# Patient Record
Sex: FEMALE | Race: WHITE | Employment: OTHER | URBAN - METROPOLITAN AREA
[De-identification: names, ages, dates, MRNs, and addresses within clinical notes are randomized per-mention and may not be internally consistent; named-entity substitution may affect disease eponyms.]

---

## 2017-01-06 ENCOUNTER — OFFICE VISIT (OUTPATIENT)
Dept: NEUROLOGY | Age: 82
End: 2017-01-06

## 2017-01-06 VITALS
DIASTOLIC BLOOD PRESSURE: 70 MMHG | WEIGHT: 133 LBS | BODY MASS INDEX: 21.38 KG/M2 | HEIGHT: 66 IN | SYSTOLIC BLOOD PRESSURE: 132 MMHG | HEART RATE: 60 BPM

## 2017-01-06 DIAGNOSIS — M48.061 LUMBAR CANAL STENOSIS: Primary | ICD-10-CM

## 2017-01-06 DIAGNOSIS — G62.9 PERIPHERAL POLYNEUROPATHY: ICD-10-CM

## 2017-01-06 PROCEDURE — 99204 OFFICE O/P NEW MOD 45 MIN: CPT | Performed by: PSYCHIATRY & NEUROLOGY

## 2017-01-06 RX ORDER — FLUOXETINE 10 MG/1
10 CAPSULE ORAL DAILY
COMMUNITY
End: 2017-01-30 | Stop reason: SDUPTHER

## 2017-01-06 RX ORDER — CLONAZEPAM 0.5 MG/1
0.5 TABLET ORAL 2 TIMES DAILY PRN
COMMUNITY
End: 2017-01-10

## 2017-01-10 ENCOUNTER — OFFICE VISIT (OUTPATIENT)
Dept: INTERNAL MEDICINE CLINIC | Age: 82
End: 2017-01-10

## 2017-01-10 VITALS
RESPIRATION RATE: 20 BRPM | BODY MASS INDEX: 21.53 KG/M2 | WEIGHT: 134 LBS | HEIGHT: 66 IN | DIASTOLIC BLOOD PRESSURE: 70 MMHG | SYSTOLIC BLOOD PRESSURE: 180 MMHG | TEMPERATURE: 98 F | HEART RATE: 70 BPM

## 2017-01-10 DIAGNOSIS — F32.9 MAJOR DEPRESSIVE DISORDER WITH SINGLE EPISODE, REMISSION STATUS UNSPECIFIED: Primary | ICD-10-CM

## 2017-01-10 PROCEDURE — 99214 OFFICE O/P EST MOD 30 MIN: CPT | Performed by: INTERNAL MEDICINE

## 2017-01-10 RX ORDER — CLONAZEPAM 0.5 MG/1
0.25 TABLET ORAL NIGHTLY
COMMUNITY
End: 2017-01-30 | Stop reason: SDUPTHER

## 2017-01-10 ASSESSMENT — ENCOUNTER SYMPTOMS: RESPIRATORY NEGATIVE: 1

## 2017-01-16 ENCOUNTER — OFFICE VISIT (OUTPATIENT)
Dept: INTERNAL MEDICINE CLINIC | Age: 82
End: 2017-01-16

## 2017-01-16 VITALS
DIASTOLIC BLOOD PRESSURE: 70 MMHG | SYSTOLIC BLOOD PRESSURE: 172 MMHG | WEIGHT: 132 LBS | RESPIRATION RATE: 14 BRPM | HEART RATE: 56 BPM | BODY MASS INDEX: 21.31 KG/M2

## 2017-01-16 DIAGNOSIS — F32.4 MAJOR DEPRESSIVE DISORDER WITH SINGLE EPISODE, IN PARTIAL REMISSION (HCC): ICD-10-CM

## 2017-01-16 DIAGNOSIS — I10 ESSENTIAL HYPERTENSION: Primary | ICD-10-CM

## 2017-01-16 PROCEDURE — 99214 OFFICE O/P EST MOD 30 MIN: CPT | Performed by: INTERNAL MEDICINE

## 2017-01-16 ASSESSMENT — ENCOUNTER SYMPTOMS: RESPIRATORY NEGATIVE: 1

## 2017-01-19 ENCOUNTER — TELEPHONE (OUTPATIENT)
Dept: INTERNAL MEDICINE CLINIC | Age: 82
End: 2017-01-19

## 2017-01-30 DIAGNOSIS — I10 ESSENTIAL HYPERTENSION: ICD-10-CM

## 2017-01-30 RX ORDER — FLUOXETINE 10 MG/1
10 CAPSULE ORAL DAILY
Qty: 30 CAPSULE | Refills: 3 | Status: SHIPPED | OUTPATIENT
Start: 2017-01-30 | End: 2017-03-17 | Stop reason: SDUPTHER

## 2017-01-30 RX ORDER — AMLODIPINE BESYLATE 5 MG/1
5 TABLET ORAL DAILY
Qty: 30 TABLET | Refills: 3 | Status: SHIPPED | OUTPATIENT
Start: 2017-01-30 | End: 2017-08-02 | Stop reason: SDUPTHER

## 2017-01-30 RX ORDER — CLONAZEPAM 0.5 MG/1
TABLET ORAL
Qty: 30 TABLET | Refills: 0 | OUTPATIENT
Start: 2017-01-30 | End: 2017-02-20 | Stop reason: SDUPTHER

## 2017-02-03 ENCOUNTER — OFFICE VISIT (OUTPATIENT)
Dept: NEUROLOGY | Age: 82
End: 2017-02-03

## 2017-02-03 VITALS
BODY MASS INDEX: 21.53 KG/M2 | WEIGHT: 134 LBS | SYSTOLIC BLOOD PRESSURE: 120 MMHG | HEART RATE: 48 BPM | HEIGHT: 66 IN | DIASTOLIC BLOOD PRESSURE: 60 MMHG

## 2017-02-03 DIAGNOSIS — M48.061 LUMBAR CANAL STENOSIS: ICD-10-CM

## 2017-02-03 DIAGNOSIS — G62.9 PERIPHERAL POLYNEUROPATHY: Primary | ICD-10-CM

## 2017-02-03 PROCEDURE — 99214 OFFICE O/P EST MOD 30 MIN: CPT | Performed by: PSYCHIATRY & NEUROLOGY

## 2017-02-04 ENCOUNTER — TELEPHONE (OUTPATIENT)
Dept: NEUROLOGY | Age: 82
End: 2017-02-04

## 2017-02-06 DIAGNOSIS — G62.9 PERIPHERAL POLYNEUROPATHY: ICD-10-CM

## 2017-02-06 LAB
C-REACTIVE PROTEIN: 0.8 MG/L (ref 0–5.1)
RHEUMATOID FACTOR: <10 IU/ML
TOTAL CK: 46 U/L (ref 26–192)

## 2017-02-07 LAB
ANA INTERPRETATION: NORMAL
ANTI-NUCLEAR ANTIBODY (ANA): NEGATIVE

## 2017-02-08 LAB
ANGIOTENSIN CONVERTING ENZYME: 20 U/L (ref 9–67)
ENA TO SSA (RO) ANTIBODY: NEGATIVE EU
ENA TO SSB (LA) ANTIBODY: NEGATIVE EU

## 2017-02-09 LAB — VITAMIN B6: 409.8 NMOL/L (ref 20–125)

## 2017-02-17 RX ORDER — CLONAZEPAM 0.5 MG/1
TABLET ORAL
Qty: 30 TABLET | OUTPATIENT
Start: 2017-02-17

## 2017-02-20 RX ORDER — CLONAZEPAM 0.5 MG/1
TABLET ORAL
Qty: 30 TABLET | Refills: 0 | Status: SHIPPED | OUTPATIENT
Start: 2017-02-20 | End: 2017-06-28

## 2017-02-21 ENCOUNTER — TELEPHONE (OUTPATIENT)
Dept: INTERNAL MEDICINE CLINIC | Age: 82
End: 2017-02-21

## 2017-03-17 ENCOUNTER — INITIAL CONSULT (OUTPATIENT)
Dept: PSYCHIATRY | Age: 82
End: 2017-03-17

## 2017-03-17 VITALS
WEIGHT: 134 LBS | HEART RATE: 72 BPM | SYSTOLIC BLOOD PRESSURE: 130 MMHG | RESPIRATION RATE: 14 BRPM | BODY MASS INDEX: 21.53 KG/M2 | DIASTOLIC BLOOD PRESSURE: 84 MMHG | HEIGHT: 66 IN

## 2017-03-17 DIAGNOSIS — F32.A DEPRESSION, UNSPECIFIED DEPRESSION TYPE: Primary | ICD-10-CM

## 2017-03-17 DIAGNOSIS — F41.9 ANXIETY: ICD-10-CM

## 2017-03-17 PROBLEM — G62.9 NEUROPATHY: Status: ACTIVE | Noted: 2017-03-17

## 2017-03-17 PROCEDURE — 99204 OFFICE O/P NEW MOD 45 MIN: CPT | Performed by: PSYCHIATRY & NEUROLOGY

## 2017-03-17 RX ORDER — FLUOXETINE HYDROCHLORIDE 20 MG/1
20 CAPSULE ORAL DAILY
Qty: 30 CAPSULE | Refills: 2 | Status: SHIPPED | OUTPATIENT
Start: 2017-03-17 | End: 2017-03-30 | Stop reason: SDUPTHER

## 2017-03-17 ASSESSMENT — ENCOUNTER SYMPTOMS
SHORTNESS OF BREATH: 0
DIARRHEA: 0
NAUSEA: 0
CHEST TIGHTNESS: 0

## 2017-03-27 ENCOUNTER — TELEPHONE (OUTPATIENT)
Dept: INTERNAL MEDICINE CLINIC | Age: 82
End: 2017-03-27

## 2017-03-28 ENCOUNTER — OFFICE VISIT (OUTPATIENT)
Dept: INTERNAL MEDICINE CLINIC | Age: 82
End: 2017-03-28

## 2017-03-28 VITALS
DIASTOLIC BLOOD PRESSURE: 80 MMHG | TEMPERATURE: 97.4 F | OXYGEN SATURATION: 96 % | WEIGHT: 133 LBS | HEART RATE: 76 BPM | BODY MASS INDEX: 21.47 KG/M2 | RESPIRATION RATE: 17 BRPM | SYSTOLIC BLOOD PRESSURE: 170 MMHG

## 2017-03-28 DIAGNOSIS — I10 ESSENTIAL HYPERTENSION: ICD-10-CM

## 2017-03-28 DIAGNOSIS — F32.4 MAJOR DEPRESSIVE DISORDER WITH SINGLE EPISODE, IN PARTIAL REMISSION (HCC): ICD-10-CM

## 2017-03-28 DIAGNOSIS — R11.0 NAUSEA: Primary | ICD-10-CM

## 2017-03-28 PROCEDURE — 99213 OFFICE O/P EST LOW 20 MIN: CPT | Performed by: INTERNAL MEDICINE

## 2017-03-29 ENCOUNTER — TELEPHONE (OUTPATIENT)
Dept: FAMILY MEDICINE CLINIC | Age: 82
End: 2017-03-29

## 2017-03-29 ASSESSMENT — ENCOUNTER SYMPTOMS
RESPIRATORY NEGATIVE: 1
GASTROINTESTINAL NEGATIVE: 1

## 2017-03-30 ENCOUNTER — TELEPHONE (OUTPATIENT)
Dept: INTERNAL MEDICINE CLINIC | Age: 82
End: 2017-03-30

## 2017-03-30 ENCOUNTER — TELEPHONE (OUTPATIENT)
Dept: FAMILY MEDICINE CLINIC | Age: 82
End: 2017-03-30

## 2017-03-30 RX ORDER — FLUOXETINE 10 MG/1
10 CAPSULE ORAL DAILY
Qty: 30 CAPSULE | Refills: 2 | Status: SHIPPED | OUTPATIENT
Start: 2017-03-30 | End: 2017-06-28 | Stop reason: DRUGHIGH

## 2017-05-02 ENCOUNTER — OFFICE VISIT (OUTPATIENT)
Dept: PSYCHIATRY | Age: 82
End: 2017-05-02

## 2017-05-02 VITALS
OXYGEN SATURATION: 94 % | SYSTOLIC BLOOD PRESSURE: 132 MMHG | DIASTOLIC BLOOD PRESSURE: 64 MMHG | HEIGHT: 66 IN | BODY MASS INDEX: 21.21 KG/M2 | RESPIRATION RATE: 14 BRPM | WEIGHT: 132 LBS | HEART RATE: 64 BPM

## 2017-05-02 DIAGNOSIS — F32.A DEPRESSION, UNSPECIFIED DEPRESSION TYPE: Primary | ICD-10-CM

## 2017-05-02 DIAGNOSIS — F41.9 ANXIETY: ICD-10-CM

## 2017-05-02 PROCEDURE — 99214 OFFICE O/P EST MOD 30 MIN: CPT | Performed by: PSYCHIATRY & NEUROLOGY

## 2017-05-02 RX ORDER — FLUOXETINE 10 MG/1
5 TABLET ORAL DAILY
Qty: 45 TABLET | Refills: 2 | Status: SHIPPED | OUTPATIENT
Start: 2017-05-02 | End: 2017-06-28 | Stop reason: DRUGHIGH

## 2017-06-28 ENCOUNTER — OFFICE VISIT (OUTPATIENT)
Dept: INTERNAL MEDICINE CLINIC | Age: 82
End: 2017-06-28

## 2017-06-28 VITALS
SYSTOLIC BLOOD PRESSURE: 130 MMHG | WEIGHT: 135 LBS | RESPIRATION RATE: 14 BRPM | BODY MASS INDEX: 21.79 KG/M2 | HEART RATE: 49 BPM | DIASTOLIC BLOOD PRESSURE: 80 MMHG

## 2017-06-28 DIAGNOSIS — R73.02 IGT (IMPAIRED GLUCOSE TOLERANCE): ICD-10-CM

## 2017-06-28 DIAGNOSIS — I10 ESSENTIAL HYPERTENSION: Primary | ICD-10-CM

## 2017-06-28 DIAGNOSIS — G62.9 PERIPHERAL POLYNEUROPATHY: ICD-10-CM

## 2017-06-28 LAB
ANION GAP SERPL CALCULATED.3IONS-SCNC: 18 MMOL/L (ref 3–16)
BUN BLDV-MCNC: 22 MG/DL (ref 7–20)
CALCIUM SERPL-MCNC: 9.8 MG/DL (ref 8.3–10.6)
CHLORIDE BLD-SCNC: 99 MMOL/L (ref 99–110)
CO2: 24 MMOL/L (ref 21–32)
CREAT SERPL-MCNC: 0.8 MG/DL (ref 0.6–1.2)
GFR AFRICAN AMERICAN: >60
GFR NON-AFRICAN AMERICAN: >60
GLUCOSE BLD-MCNC: 94 MG/DL (ref 70–99)
HBA1C MFR BLD: 5.5 %
POTASSIUM SERPL-SCNC: 4.7 MMOL/L (ref 3.5–5.1)
SODIUM BLD-SCNC: 141 MMOL/L (ref 136–145)

## 2017-06-28 PROCEDURE — 83036 HEMOGLOBIN GLYCOSYLATED A1C: CPT | Performed by: INTERNAL MEDICINE

## 2017-06-28 PROCEDURE — 36415 COLL VENOUS BLD VENIPUNCTURE: CPT | Performed by: INTERNAL MEDICINE

## 2017-06-28 PROCEDURE — 99213 OFFICE O/P EST LOW 20 MIN: CPT | Performed by: INTERNAL MEDICINE

## 2017-06-28 RX ORDER — FLUOXETINE 10 MG/1
5 TABLET ORAL DAILY
Qty: 45 TABLET | Refills: 2 | Status: SHIPPED
Start: 2017-06-28 | End: 2017-11-03 | Stop reason: SDUPTHER

## 2017-06-28 RX ORDER — CLONAZEPAM 0.5 MG/1
0.5 TABLET ORAL PRN
COMMUNITY
End: 2017-06-28 | Stop reason: DRUGHIGH

## 2017-06-28 RX ORDER — METOPROLOL SUCCINATE 50 MG/1
TABLET, EXTENDED RELEASE ORAL
Qty: 30 TABLET | Refills: 5 | Status: SHIPPED
Start: 2017-06-28 | End: 2017-08-16 | Stop reason: ALTCHOICE

## 2017-06-28 RX ORDER — CLONAZEPAM 0.5 MG/1
0.5 TABLET ORAL DAILY
Qty: 60 TABLET | Refills: 1 | Status: SHIPPED
Start: 2017-06-28 | End: 2017-07-06 | Stop reason: SDUPTHER

## 2017-07-05 ENCOUNTER — TELEPHONE (OUTPATIENT)
Dept: INTERNAL MEDICINE CLINIC | Age: 82
End: 2017-07-05

## 2017-07-06 RX ORDER — CLONAZEPAM 0.5 MG/1
TABLET ORAL
Qty: 30 TABLET | Refills: 1 | Status: SHIPPED | OUTPATIENT
Start: 2017-07-06 | End: 2017-08-29 | Stop reason: SDUPTHER

## 2017-08-02 DIAGNOSIS — I10 ESSENTIAL HYPERTENSION: ICD-10-CM

## 2017-08-02 RX ORDER — AMLODIPINE BESYLATE 5 MG/1
TABLET ORAL
Qty: 30 TABLET | Refills: 5 | Status: SHIPPED | OUTPATIENT
Start: 2017-08-02 | End: 2018-08-06

## 2017-08-16 ENCOUNTER — OFFICE VISIT (OUTPATIENT)
Dept: INTERNAL MEDICINE CLINIC | Age: 82
End: 2017-08-16

## 2017-08-16 VITALS
BODY MASS INDEX: 21.53 KG/M2 | RESPIRATION RATE: 16 BRPM | HEIGHT: 66 IN | WEIGHT: 134 LBS | SYSTOLIC BLOOD PRESSURE: 138 MMHG | DIASTOLIC BLOOD PRESSURE: 76 MMHG

## 2017-08-16 DIAGNOSIS — F32.9 SINGLE CURRENT EPISODE OF MAJOR DEPRESSIVE DISORDER, UNSPECIFIED DEPRESSION EPISODE SEVERITY: Primary | ICD-10-CM

## 2017-08-16 PROCEDURE — 99213 OFFICE O/P EST LOW 20 MIN: CPT | Performed by: INTERNAL MEDICINE

## 2017-08-29 ENCOUNTER — OFFICE VISIT (OUTPATIENT)
Dept: INTERNAL MEDICINE CLINIC | Age: 82
End: 2017-08-29

## 2017-08-29 VITALS
BODY MASS INDEX: 21.79 KG/M2 | RESPIRATION RATE: 14 BRPM | DIASTOLIC BLOOD PRESSURE: 80 MMHG | HEART RATE: 60 BPM | SYSTOLIC BLOOD PRESSURE: 150 MMHG | WEIGHT: 135 LBS

## 2017-08-29 DIAGNOSIS — R11.0 NAUSEA: ICD-10-CM

## 2017-08-29 DIAGNOSIS — F32.4 MAJOR DEPRESSIVE DISORDER WITH SINGLE EPISODE, IN PARTIAL REMISSION (HCC): Primary | ICD-10-CM

## 2017-08-29 PROCEDURE — 99213 OFFICE O/P EST LOW 20 MIN: CPT | Performed by: INTERNAL MEDICINE

## 2017-08-29 RX ORDER — CLONAZEPAM 0.5 MG/1
TABLET ORAL
Qty: 30 TABLET | Refills: 1 | Status: SHIPPED | OUTPATIENT
Start: 2017-08-29 | End: 2017-11-03 | Stop reason: SDUPTHER

## 2017-08-29 ASSESSMENT — ENCOUNTER SYMPTOMS
ABDOMINAL PAIN: 0
VOMITING: 0
NAUSEA: 1

## 2017-09-18 ENCOUNTER — TELEPHONE (OUTPATIENT)
Dept: INTERNAL MEDICINE CLINIC | Age: 82
End: 2017-09-18

## 2017-09-27 ENCOUNTER — OFFICE VISIT (OUTPATIENT)
Dept: INTERNAL MEDICINE CLINIC | Age: 82
End: 2017-09-27

## 2017-09-27 VITALS
DIASTOLIC BLOOD PRESSURE: 70 MMHG | RESPIRATION RATE: 16 BRPM | BODY MASS INDEX: 21.63 KG/M2 | OXYGEN SATURATION: 98 % | HEART RATE: 58 BPM | WEIGHT: 134 LBS | SYSTOLIC BLOOD PRESSURE: 140 MMHG

## 2017-09-27 DIAGNOSIS — I25.10 CORONARY ARTERY DISEASE INVOLVING NATIVE CORONARY ARTERY OF NATIVE HEART WITHOUT ANGINA PECTORIS: ICD-10-CM

## 2017-09-27 DIAGNOSIS — F32.4 MAJOR DEPRESSIVE DISORDER WITH SINGLE EPISODE, IN PARTIAL REMISSION (HCC): ICD-10-CM

## 2017-09-27 DIAGNOSIS — I10 ESSENTIAL HYPERTENSION: Primary | ICD-10-CM

## 2017-09-27 PROCEDURE — 99214 OFFICE O/P EST MOD 30 MIN: CPT | Performed by: INTERNAL MEDICINE

## 2017-09-27 ASSESSMENT — ENCOUNTER SYMPTOMS: RESPIRATORY NEGATIVE: 1

## 2017-09-28 ENCOUNTER — HOSPITAL ENCOUNTER (OUTPATIENT)
Dept: WOMENS IMAGING | Age: 82
Discharge: HOME | End: 2017-09-28
Attending: SURGERY | Admitting: SURGERY

## 2017-09-28 DIAGNOSIS — Z12.31 ENCOUNTER FOR SCREENING MAMMOGRAM FOR MALIGNANT NEOPLASM OF BREAST: ICD-10-CM

## 2017-11-03 ENCOUNTER — TELEPHONE (OUTPATIENT)
Dept: INTERNAL MEDICINE CLINIC | Age: 82
End: 2017-11-03

## 2017-11-03 ENCOUNTER — OFFICE VISIT (OUTPATIENT)
Dept: PSYCHIATRY | Age: 82
End: 2017-11-03

## 2017-11-03 VITALS
RESPIRATION RATE: 16 BRPM | HEIGHT: 66 IN | BODY MASS INDEX: 21.21 KG/M2 | WEIGHT: 132 LBS | HEART RATE: 74 BPM | SYSTOLIC BLOOD PRESSURE: 128 MMHG | DIASTOLIC BLOOD PRESSURE: 88 MMHG

## 2017-11-03 DIAGNOSIS — F32.A DEPRESSION, UNSPECIFIED DEPRESSION TYPE: Primary | ICD-10-CM

## 2017-11-03 DIAGNOSIS — F41.9 ANXIETY: ICD-10-CM

## 2017-11-03 PROCEDURE — 99214 OFFICE O/P EST MOD 30 MIN: CPT | Performed by: PSYCHIATRY & NEUROLOGY

## 2017-11-03 RX ORDER — FLUOXETINE 10 MG/1
5 TABLET ORAL DAILY
Qty: 45 TABLET | Refills: 2 | Status: SHIPPED | OUTPATIENT
Start: 2017-11-03 | End: 2018-03-26 | Stop reason: SDUPTHER

## 2017-11-03 RX ORDER — CLONAZEPAM 0.5 MG/1
TABLET ORAL
Qty: 30 TABLET | Refills: 1 | Status: SHIPPED | OUTPATIENT
Start: 2017-11-03 | End: 2018-03-26 | Stop reason: SDUPTHER

## 2017-11-03 NOTE — PROGRESS NOTES
Psych Follow Up Progress Note    11/3/17  Terrie Flowers  [id number]    I have reviewed recent documentation:  Terrie Flowers is a 80 y.o. female  This patient  has a past medical history of Aortic insufficiency; Breast cancer ([facility] [address] CAD (coronary artery disease); HTN (hypertension); and SVT (supraventricular tachycardia) ([facility] [address] Chief Complaint   Patient presents with    Depression    Discuss Medications     cloudy/disoriented in the morning     Subjective/Interval Hx:   Rowan Melgoza is having some medical trouble. He had an MI in early Sept,. Got on a bunch of meds that he didn't tolerate, ended up in the hospital.  Got an allergic rxn, just terrible. Granddaughter Yanique Reis is staying with them and her daughter. Objective:  Vitals:    11/03/17 1411   BP: 128/88   Pulse: 74   Resp: 16   Weight: 132 lb (59.9 kg)   Height: 5' 6\" (1.676 m)     Waist Circumference  Waist (Inches): 30 in    No results found for this or any previous visit (from the past 168 hour(s)). Current Outpatient Prescriptions   Medication Sig Dispense Refill    clonazePAM (KLONOPIN) 0.5 MG tablet Take 1/4 tablet daily. . 30 tablet 1    amLODIPine (NORVASC) 5 MG tablet TAKE ONE TABLET BY MOUTH DAILY 30 tablet 5    FLUoxetine HCl, PMDD, 10 MG TABS Take 5 mg by mouth daily Take 1/2 tab (2.5 mg) daily. 45 tablet 2    nitroGLYCERIN (NITROSTAT) 0.4 MG SL tablet Place 0.4 mg under the tongue every 5 minutes as needed for Chest pain Dissolve 1 tab under tongue at first sign of chest pain. May repeat every 5 minutes until relief is obtained. If pain persists after taking 3 tabs in a 15-minute period, or the pain is different than is typically experienced, call 9-1-1 immediately.       Multiple Vitamin (MULTI VITAMIN DAILY PO) Take by mouth Prodovite      Ascorbic Acid (VITAMIN C) 500 MG CAPS Take 1 tablet by mouth      Potassium Gluconate 550 MG TABS Take by mouth      Cyanocobalamin (VITAMIN B12 PO) Take by mouth      Vitamin D (CHOLECALCIFEROL) 1000 UNITS CAPS capsule Take 1,000 Units by mouth daily      aspirin 81 MG tablet Take 81 mg by mouth daily. No current facility-administered medications for this visit. Controlled Substances Monitoring:     Attestation: The Prescription Monitoring Report for this patient was reviewed today. Katlyn Ulloa MD)  Documentation: No signs of potential drug abuse or diversion identified. Katlyn Ulloa MD)    ROS:  No tremor, gait nl    MSE:  A-casually dressed, good EC, pleasant and engageable  A-full  M-essentially euthymic  S-grossly A + O  I/J-fair/fair  T-linear, goal-directed. Speech c nl r/t/v/a. No S/H I, no A/V H.      A/P  1. OCPD traits, anxiety NOS-Prozac 5 mg qd and klonopin 0.25 bid prn. Jarek Toribio Dr. [id number]. Sees therapist Rosalind Falcon.

## 2017-11-08 ENCOUNTER — TELEPHONE (OUTPATIENT)
Dept: INTERNAL MEDICINE CLINIC | Age: 82
End: 2017-11-08

## 2018-03-26 ENCOUNTER — OFFICE VISIT (OUTPATIENT)
Dept: PSYCHIATRY | Age: 83
End: 2018-03-26

## 2018-03-26 VITALS
WEIGHT: 130.4 LBS | HEIGHT: 66 IN | OXYGEN SATURATION: 95 % | DIASTOLIC BLOOD PRESSURE: 86 MMHG | BODY MASS INDEX: 20.96 KG/M2 | HEART RATE: 58 BPM | SYSTOLIC BLOOD PRESSURE: 138 MMHG

## 2018-03-26 DIAGNOSIS — F32.A DEPRESSION, UNSPECIFIED DEPRESSION TYPE: ICD-10-CM

## 2018-03-26 DIAGNOSIS — F41.9 ANXIETY: Primary | ICD-10-CM

## 2018-03-26 PROCEDURE — 99214 OFFICE O/P EST MOD 30 MIN: CPT | Performed by: PSYCHIATRY & NEUROLOGY

## 2018-03-26 RX ORDER — CLONAZEPAM 0.5 MG/1
TABLET ORAL
Qty: 30 TABLET | Refills: 1 | Status: SHIPPED | OUTPATIENT
Start: 2018-03-26 | End: 2018-09-21 | Stop reason: SDUPTHER

## 2018-03-26 RX ORDER — FLUOXETINE 10 MG/1
5 TABLET ORAL DAILY
Qty: 45 TABLET | Refills: 2 | Status: SHIPPED | OUTPATIENT
Start: 2018-03-26 | End: 2018-09-21 | Stop reason: SDUPTHER

## 2018-05-23 ENCOUNTER — OFFICE VISIT (OUTPATIENT)
Dept: INTERNAL MEDICINE CLINIC | Age: 83
End: 2018-05-23

## 2018-05-23 VITALS
SYSTOLIC BLOOD PRESSURE: 147 MMHG | HEIGHT: 66 IN | WEIGHT: 130 LBS | BODY MASS INDEX: 20.89 KG/M2 | HEART RATE: 64 BPM | DIASTOLIC BLOOD PRESSURE: 70 MMHG

## 2018-05-23 DIAGNOSIS — H25.013 CORTICAL AGE-RELATED CATARACT OF BOTH EYES: ICD-10-CM

## 2018-05-23 DIAGNOSIS — I25.10 CORONARY ARTERY DISEASE INVOLVING NATIVE CORONARY ARTERY OF NATIVE HEART WITHOUT ANGINA PECTORIS: ICD-10-CM

## 2018-05-23 DIAGNOSIS — I10 ESSENTIAL HYPERTENSION: ICD-10-CM

## 2018-05-23 DIAGNOSIS — Z01.818 PRE-OP EXAMINATION: Primary | ICD-10-CM

## 2018-05-23 DIAGNOSIS — E78.2 MIXED HYPERLIPIDEMIA: ICD-10-CM

## 2018-05-23 DIAGNOSIS — F32.5 MAJOR DEPRESSIVE DISORDER WITH SINGLE EPISODE, IN FULL REMISSION (HCC): ICD-10-CM

## 2018-05-23 DIAGNOSIS — I47.1 SVT (SUPRAVENTRICULAR TACHYCARDIA) (HCC): ICD-10-CM

## 2018-05-23 DIAGNOSIS — G62.9 PERIPHERAL POLYNEUROPATHY: ICD-10-CM

## 2018-05-23 LAB
ANION GAP SERPL CALCULATED.3IONS-SCNC: 14 MMOL/L (ref 3–16)
BASOPHILS ABSOLUTE: 0.1 K/UL (ref 0–0.2)
BASOPHILS RELATIVE PERCENT: 0.8 %
BUN BLDV-MCNC: 24 MG/DL (ref 7–20)
CALCIUM SERPL-MCNC: 9.9 MG/DL (ref 8.3–10.6)
CHLORIDE BLD-SCNC: 102 MMOL/L (ref 99–110)
CO2: 27 MMOL/L (ref 21–32)
CREAT SERPL-MCNC: 0.8 MG/DL (ref 0.6–1.2)
EOSINOPHILS ABSOLUTE: 0.2 K/UL (ref 0–0.6)
EOSINOPHILS RELATIVE PERCENT: 2.2 %
GFR AFRICAN AMERICAN: >60
GFR NON-AFRICAN AMERICAN: >60
GLUCOSE BLD-MCNC: 115 MG/DL (ref 70–99)
HCT VFR BLD CALC: 39.1 % (ref 36–48)
HEMOGLOBIN: 13.5 G/DL (ref 12–16)
LYMPHOCYTES ABSOLUTE: 1.6 K/UL (ref 1–5.1)
LYMPHOCYTES RELATIVE PERCENT: 22 %
MCH RBC QN AUTO: 33.7 PG (ref 26–34)
MCHC RBC AUTO-ENTMCNC: 34.5 G/DL (ref 31–36)
MCV RBC AUTO: 97.8 FL (ref 80–100)
MONOCYTES ABSOLUTE: 0.7 K/UL (ref 0–1.3)
MONOCYTES RELATIVE PERCENT: 9.1 %
NEUTROPHILS ABSOLUTE: 4.9 K/UL (ref 1.7–7.7)
NEUTROPHILS RELATIVE PERCENT: 65.9 %
PDW BLD-RTO: 13.7 % (ref 12.4–15.4)
PLATELET # BLD: 305 K/UL (ref 135–450)
PMV BLD AUTO: 8.9 FL (ref 5–10.5)
POTASSIUM SERPL-SCNC: 4.2 MMOL/L (ref 3.5–5.1)
RBC # BLD: 4 M/UL (ref 4–5.2)
SODIUM BLD-SCNC: 143 MMOL/L (ref 136–145)
WBC # BLD: 7.5 K/UL (ref 4–11)

## 2018-05-23 PROCEDURE — 36415 COLL VENOUS BLD VENIPUNCTURE: CPT | Performed by: INTERNAL MEDICINE

## 2018-05-23 PROCEDURE — 99215 OFFICE O/P EST HI 40 MIN: CPT | Performed by: INTERNAL MEDICINE

## 2018-05-23 PROCEDURE — 93000 ELECTROCARDIOGRAM COMPLETE: CPT | Performed by: INTERNAL MEDICINE

## 2018-05-23 ASSESSMENT — ENCOUNTER SYMPTOMS: GASTROINTESTINAL NEGATIVE: 1

## 2018-06-04 ENCOUNTER — HOSPITAL ENCOUNTER (OUTPATIENT)
Dept: SURGERY | Age: 83
Discharge: HOME | End: 2018-06-04
Attending: OPHTHALMOLOGY | Admitting: OPHTHALMOLOGY

## 2018-06-04 VITALS
RESPIRATION RATE: 18 BRPM | OXYGEN SATURATION: 100 % | SYSTOLIC BLOOD PRESSURE: 146 MMHG | TEMPERATURE: 97 F | HEART RATE: 54 BPM | DIASTOLIC BLOOD PRESSURE: 50 MMHG

## 2018-06-04 DIAGNOSIS — Z98.42 CATARACT EXTRACTION STATUS OF EYE, LEFT: ICD-10-CM

## 2018-06-04 DIAGNOSIS — H25.812 COMBINED FORMS OF AGE-RELATED CATARACT OF LEFT EYE: ICD-10-CM

## 2018-06-04 RX ORDER — SODIUM CHLORIDE 0.9 % (FLUSH) 0.9 %
10 SYRINGE (ML) INJECTION PRN
Status: DISCONTINUED | OUTPATIENT
Start: 2018-06-04 | End: 2018-06-04 | Stop reason: SDUPTHER

## 2018-06-04 RX ORDER — SODIUM CHLORIDE 0.9 % (FLUSH) 0.9 %
10 SYRINGE (ML) INJECTION EVERY 12 HOURS SCHEDULED
Status: DISCONTINUED | OUTPATIENT
Start: 2018-06-04 | End: 2018-06-05 | Stop reason: HOSPADM

## 2018-06-04 RX ORDER — CIPROFLOXACIN HYDROCHLORIDE 3.5 MG/ML
1 SOLUTION/ DROPS TOPICAL
Status: COMPLETED | OUTPATIENT
Start: 2018-06-04 | End: 2018-06-04

## 2018-06-04 RX ORDER — SODIUM CHLORIDE 0.9 % (FLUSH) 0.9 %
10 SYRINGE (ML) INJECTION EVERY 12 HOURS SCHEDULED
Status: DISCONTINUED | OUTPATIENT
Start: 2018-06-04 | End: 2018-06-04 | Stop reason: SDUPTHER

## 2018-06-04 RX ORDER — ONDANSETRON 2 MG/ML
4 INJECTION INTRAMUSCULAR; INTRAVENOUS
Status: ACTIVE | OUTPATIENT
Start: 2018-06-04 | End: 2018-06-04

## 2018-06-04 RX ORDER — SODIUM CHLORIDE 9 MG/ML
INJECTION, SOLUTION INTRAVENOUS CONTINUOUS
Status: DISCONTINUED | OUTPATIENT
Start: 2018-06-04 | End: 2018-06-05 | Stop reason: HOSPADM

## 2018-06-04 RX ORDER — SODIUM CHLORIDE 0.9 % (FLUSH) 0.9 %
10 SYRINGE (ML) INJECTION PRN
Status: DISCONTINUED | OUTPATIENT
Start: 2018-06-04 | End: 2018-06-05 | Stop reason: HOSPADM

## 2018-06-04 RX ADMIN — SODIUM CHLORIDE: 9 INJECTION, SOLUTION INTRAVENOUS at 07:03

## 2018-06-04 RX ADMIN — Medication 0.1 ML: at 07:02

## 2018-06-04 RX ADMIN — CIPROFLOXACIN HYDROCHLORIDE 1 DROP: 3.5 SOLUTION/ DROPS TOPICAL at 07:01

## 2018-06-04 RX ADMIN — CIPROFLOXACIN HYDROCHLORIDE 1 DROP: 3.5 SOLUTION/ DROPS TOPICAL at 07:06

## 2018-06-04 ASSESSMENT — PAIN - FUNCTIONAL ASSESSMENT: PAIN_FUNCTIONAL_ASSESSMENT: 0-10

## 2018-06-04 ASSESSMENT — LIFESTYLE VARIABLES: SMOKING_STATUS: 0

## 2018-06-04 ASSESSMENT — ENCOUNTER SYMPTOMS: SHORTNESS OF BREATH: 0

## 2018-06-18 ENCOUNTER — HOSPITAL ENCOUNTER (OUTPATIENT)
Dept: SURGERY | Age: 83
Discharge: HOME | End: 2018-06-18
Attending: OPHTHALMOLOGY | Admitting: OPHTHALMOLOGY

## 2018-06-18 VITALS
OXYGEN SATURATION: 100 % | TEMPERATURE: 97.3 F | RESPIRATION RATE: 13 BRPM | SYSTOLIC BLOOD PRESSURE: 145 MMHG | HEART RATE: 49 BPM | DIASTOLIC BLOOD PRESSURE: 60 MMHG

## 2018-06-18 DIAGNOSIS — H25.011 CORTICAL AGE-RELATED CATARACT OF RIGHT EYE: ICD-10-CM

## 2018-06-18 DIAGNOSIS — Z98.41 CATARACT EXTRACTION STATUS OF EYE, RIGHT: ICD-10-CM

## 2018-06-18 RX ORDER — ONDANSETRON 2 MG/ML
4 INJECTION INTRAMUSCULAR; INTRAVENOUS
Status: ACTIVE | OUTPATIENT
Start: 2018-06-18 | End: 2018-06-18

## 2018-06-18 RX ORDER — SODIUM CHLORIDE 0.9 % (FLUSH) 0.9 %
10 SYRINGE (ML) INJECTION PRN
Status: DISCONTINUED | OUTPATIENT
Start: 2018-06-18 | End: 2018-06-19 | Stop reason: HOSPADM

## 2018-06-18 RX ORDER — SODIUM CHLORIDE 9 MG/ML
INJECTION, SOLUTION INTRAVENOUS CONTINUOUS
Status: DISCONTINUED | OUTPATIENT
Start: 2018-06-18 | End: 2018-06-19 | Stop reason: HOSPADM

## 2018-06-18 RX ORDER — CIPROFLOXACIN HYDROCHLORIDE 3.5 MG/ML
1 SOLUTION/ DROPS TOPICAL SEE ADMIN INSTRUCTIONS
Status: COMPLETED | OUTPATIENT
Start: 2018-06-18 | End: 2018-06-18

## 2018-06-18 RX ORDER — SODIUM CHLORIDE 0.9 % (FLUSH) 0.9 %
10 SYRINGE (ML) INJECTION EVERY 12 HOURS SCHEDULED
Status: DISCONTINUED | OUTPATIENT
Start: 2018-06-18 | End: 2018-06-19 | Stop reason: HOSPADM

## 2018-06-18 RX ORDER — PROMETHAZINE HYDROCHLORIDE 25 MG/ML
6.25 INJECTION, SOLUTION INTRAMUSCULAR; INTRAVENOUS
Status: ACTIVE | OUTPATIENT
Start: 2018-06-18 | End: 2018-06-18

## 2018-06-18 RX ORDER — LABETALOL HYDROCHLORIDE 5 MG/ML
5 INJECTION, SOLUTION INTRAVENOUS EVERY 10 MIN PRN
Status: DISCONTINUED | OUTPATIENT
Start: 2018-06-18 | End: 2018-06-19 | Stop reason: HOSPADM

## 2018-06-18 RX ADMIN — CIPROFLOXACIN HYDROCHLORIDE 1 DROP: 3.5 SOLUTION/ DROPS TOPICAL at 06:52

## 2018-06-18 RX ADMIN — Medication 0.1 ML: at 06:52

## 2018-06-18 RX ADMIN — SODIUM CHLORIDE: 9 INJECTION, SOLUTION INTRAVENOUS at 06:52

## 2018-06-18 RX ADMIN — CIPROFLOXACIN HYDROCHLORIDE 1 DROP: 3.5 SOLUTION/ DROPS TOPICAL at 06:49

## 2018-06-18 ASSESSMENT — ENCOUNTER SYMPTOMS: SHORTNESS OF BREATH: 0

## 2018-06-18 ASSESSMENT — LIFESTYLE VARIABLES: SMOKING_STATUS: 0

## 2018-07-18 ENCOUNTER — OFFICE VISIT (OUTPATIENT)
Dept: INTERNAL MEDICINE CLINIC | Age: 83
End: 2018-07-18

## 2018-07-18 VITALS
OXYGEN SATURATION: 94 % | DIASTOLIC BLOOD PRESSURE: 60 MMHG | HEART RATE: 60 BPM | RESPIRATION RATE: 18 BRPM | WEIGHT: 132.4 LBS | SYSTOLIC BLOOD PRESSURE: 138 MMHG | BODY MASS INDEX: 21.28 KG/M2 | HEIGHT: 66 IN

## 2018-07-18 DIAGNOSIS — F32.4 MAJOR DEPRESSIVE DISORDER WITH SINGLE EPISODE, IN PARTIAL REMISSION (HCC): Primary | ICD-10-CM

## 2018-07-18 PROCEDURE — 99213 OFFICE O/P EST LOW 20 MIN: CPT | Performed by: INTERNAL MEDICINE

## 2018-07-19 ASSESSMENT — ENCOUNTER SYMPTOMS: RESPIRATORY NEGATIVE: 1

## 2018-08-06 ENCOUNTER — PAT TELEPHONE (OUTPATIENT)
Dept: PREADMISSION TESTING | Age: 83
End: 2018-08-06

## 2018-08-06 VITALS — BODY MASS INDEX: 20.89 KG/M2 | WEIGHT: 130 LBS | HEIGHT: 66 IN

## 2018-08-06 RX ORDER — AMLODIPINE BESYLATE 10 MG/1
10 TABLET ORAL DAILY
COMMUNITY

## 2018-08-06 NOTE — PROGRESS NOTES
[facility] time___0800_________        Surgery time_0900___________    Take the following medications with a sip of water:    Do not eat or drink anything after 12:00 midnight prior to your surgery. EXCEPT PREP  This includes water chewing gum, mints and ice chips. You may brush your teeth and gargle the morning of your surgery, but do not swallow the water      You may be asked to stop blood thinners such as Coumadin, Plavix, Fragmin, Lovenox, etc., or any anti-inflammatories such as:  Aspirin, Ibuprofen, Advil, Naproxen prior to your surgery. We also ask that you stop any OTC medications such as fish oil, vitamin E, glucosamine, garlic, Multivitamins, COQ 10, etc.    We ask that you do not smoke 24 hours prior to surgery  We ask that you do not  drink any alcoholic beverages 24 hours prior to surgery     You must make arrangements for a responsible adult to take you home after your surgery. For your safety you will not be allowed to leave alone or drive yourself home. Your surgery will be cancelled if you do not have a ride home. Also for your safety, it is strongly suggested that someone stay with you the first 24 hours after your surgery. A parent or legal guardian must accompany a child scheduled for surgery and plan to stay at the hospital until the child is discharged. Please do not bring other children with you. For your comfort, please wear simple loose fitting clothing to the hospital.  Please do not bring valuables. Do not wear any make-up or nail polish on your fingers or toes      For your safety, please do not wear any jewelry or body piercing's on the day of surgery. All jewelry must be removed. If you have dentures, they will be removed before going to operating room. For your convenience, we will provide you with a container.     If you wear contact lenses or glasses, they will be removed, please bring a case for them.     If you have a living will and a durable power of  for healthcare, please bring in a copy. As part of our patient safety program to minimize surgical site infections, we ask you to do the following:    · Please notify your surgeon if you develop any illness between         now and the  day of your surgery. · This includes a cough, cold, fever, sore throat, nausea,         or vomiting, and diarrhea, etc.  ·  Please notify your surgeon if you experience dizziness, shortness         of breath or blurred vision between now and the time of your surgery. You may shower the night before surgery or the morning of   your surgery with an antibacterial soap. You will need to bring a photo ID and insurance card    [facility] has an onsite pharmacy, would you like to utilize our pharmacy     If you will be staying overnight and use a C-pap machine, please bring   your C-pap to hospital     Our goal is to provide you with excellent care, therefore, visitors will be limited to two(2) in the room at a time so that we may focus on providing this care for you. Please contact pre-admission testing if you have any further questions. [facility] phone number:  [phone]  Please note these are generalized instructions for all surgical cases, you may be provided with more specific instructions according to your surgery.

## 2018-08-07 ENCOUNTER — OFFICE VISIT (OUTPATIENT)
Dept: INTERNAL MEDICINE CLINIC | Age: 83
End: 2018-08-07

## 2018-08-07 VITALS
WEIGHT: 132 LBS | HEIGHT: 66 IN | BODY MASS INDEX: 21.21 KG/M2 | DIASTOLIC BLOOD PRESSURE: 60 MMHG | OXYGEN SATURATION: 97 % | HEART RATE: 67 BPM | RESPIRATION RATE: 16 BRPM | SYSTOLIC BLOOD PRESSURE: 130 MMHG

## 2018-08-07 DIAGNOSIS — R42 VERTIGO: Primary | ICD-10-CM

## 2018-08-07 PROCEDURE — 99213 OFFICE O/P EST LOW 20 MIN: CPT | Performed by: INTERNAL MEDICINE

## 2018-08-07 RX ORDER — MECLIZINE HCL 12.5 MG/1
12.5 TABLET ORAL 3 TIMES DAILY PRN
Qty: 30 TABLET | Refills: 0 | Status: SHIPPED | OUTPATIENT
Start: 2018-08-07 | End: 2018-08-17

## 2018-08-07 ASSESSMENT — ENCOUNTER SYMPTOMS: RESPIRATORY NEGATIVE: 1

## 2018-08-07 NOTE — PROGRESS NOTES
Subjective:      Patient ID: Melanie Turk is a 80 y.o. female. MICKEY Macdonald is here with the complaint of dysequilibrium/vertigo for the last month. Seemed to follow her cataract surgery. She has rather chronic dysequilibrium probably made worse by change in position. No tinnitus nor loss of hearing. No focal neurologic complaints. Review of Systems   HENT: Negative for ear discharge, ear pain, hearing loss and tinnitus. Respiratory: Negative. Cardiovascular: Negative. Neurological: Positive for weakness. Negative for syncope, speech difficulty, numbness and headaches. Dysequilibrium/vertigo. Objective:   Physical Exam   Constitutional: She is oriented to person, place, and time. She appears well-developed and well-nourished. No distress. Neck: No JVD present. Cardiovascular: Normal rate, regular rhythm and normal heart sounds. Pulmonary/Chest: Effort normal and breath sounds normal. No respiratory distress. She has no wheezes. She has no rales. Neurological: She is alert and oriented to person, place, and time. She has normal strength. No cranial nerve deficit or sensory deficit. She displays a negative Romberg sign. Elliot-Hallpike symptomatic upon sitting up. Assessment:      1. Vertigo            Plan:      Enrique Jeffrey was seen today for dizziness and ear problem. Diagnoses and all orders for this visit:    Vertigo, possibly BPV. There is always an element of emotional overlay with Merline  -     Trial of meclizine (ANTIVERT) 12.5 MG tablet; Take 1 tablet by mouth 3 times daily as needed for Dizziness; told her to take prn and discussed se of drowsiness and dry mouth.  - May need to see ENT.

## 2018-08-09 ENCOUNTER — HOSPITAL ENCOUNTER (OUTPATIENT)
Dept: ENDOSCOPY | Age: 83
Discharge: HOME | End: 2018-08-10
Attending: INTERNAL MEDICINE | Admitting: INTERNAL MEDICINE

## 2018-08-09 VITALS
BODY MASS INDEX: 21.28 KG/M2 | HEIGHT: 66 IN | RESPIRATION RATE: 18 BRPM | WEIGHT: 132.4 LBS | HEART RATE: 52 BPM | SYSTOLIC BLOOD PRESSURE: 167 MMHG | OXYGEN SATURATION: 100 % | DIASTOLIC BLOOD PRESSURE: 58 MMHG | TEMPERATURE: 97 F

## 2018-08-09 DIAGNOSIS — R11.0 NAUSEA: ICD-10-CM

## 2018-08-09 DIAGNOSIS — I10 ESSENTIAL HYPERTENSION: ICD-10-CM

## 2018-08-09 LAB
EKG ATRIAL RATE: 55 BPM
EKG DIAGNOSIS: NORMAL
EKG P AXIS: 40 DEGREES
EKG Q-T INTERVAL: 456 MS
EKG QRS DURATION: 106 MS
EKG QTC CALCULATION (BAZETT): 470 MS
EKG R AXIS: -37 DEGREES
EKG T AXIS: 40 DEGREES
EKG VENTRICULAR RATE: 64 BPM

## 2018-08-09 PROCEDURE — 93010 ELECTROCARDIOGRAM REPORT: CPT | Performed by: INTERNAL MEDICINE

## 2018-08-09 RX ORDER — SODIUM CHLORIDE 0.9 % (FLUSH) 0.9 %
10 SYRINGE (ML) INJECTION EVERY 12 HOURS SCHEDULED
Status: DISCONTINUED | OUTPATIENT
Start: 2018-08-09 | End: 2018-09-28 | Stop reason: HOSPADM

## 2018-08-09 RX ORDER — AMLODIPINE BESYLATE 5 MG/1
TABLET ORAL
Qty: 30 TABLET | Refills: 5 | Status: SHIPPED | OUTPATIENT
Start: 2018-08-09 | End: 2018-09-18

## 2018-08-09 RX ORDER — SODIUM CHLORIDE 0.9 % (FLUSH) 0.9 %
10 SYRINGE (ML) INJECTION PRN
Status: DISCONTINUED | OUTPATIENT
Start: 2018-08-09 | End: 2018-09-28 | Stop reason: HOSPADM

## 2018-08-09 RX ORDER — SODIUM CHLORIDE 9 MG/ML
INJECTION, SOLUTION INTRAVENOUS CONTINUOUS
Status: DISCONTINUED | OUTPATIENT
Start: 2018-08-09 | End: 2018-09-28 | Stop reason: HOSPADM

## 2018-08-09 RX ADMIN — SODIUM CHLORIDE: 9 INJECTION, SOLUTION INTRAVENOUS at 08:58

## 2018-08-09 ASSESSMENT — PAIN SCALES - GENERAL
PAINLEVEL_OUTOF10: 0

## 2018-08-09 ASSESSMENT — PAIN - FUNCTIONAL ASSESSMENT: PAIN_FUNCTIONAL_ASSESSMENT: 0-10

## 2018-08-09 NOTE — ANESTHESIA PRE-OP
meclizine (ANTIVERT) 12.5 MG tablet Take 1 tablet by mouth 3 times daily as needed for Dizziness 30 tablet 0    amLODIPine (NORVASC) 10 MG tablet Take 10 mg by mouth daily      Omega-3 Fatty Acids (FISH OIL PO) Take 1 tablet by mouth daily      FLUoxetine HCl, PMDD, 10 MG TABS Take 5 mg by mouth daily Take 1/2 tab (2.5 mg) daily. 45 tablet 2    clonazePAM (KLONOPIN) 0.5 MG tablet Take 1/4 tablet daily. .. (Patient taking differently: 0.5 mg nightly as needed. Take 1/4 tablet daily. ..) 30 tablet 1    Multiple Vitamin (MULTI VITAMIN DAILY PO) Take by mouth Prodovite      Ascorbic Acid (VITAMIN C) 500 MG CAPS Take 1 tablet by mouth      Potassium Gluconate 550 MG TABS Take by mouth      Vitamin D (CHOLECALCIFEROL) 1000 UNITS CAPS capsule Take 1,000 Units by mouth daily      aspirin 81 MG tablet Take 81 mg by mouth daily.  nitroGLYCERIN (NITROSTAT) 0.4 MG SL tablet Place 0.4 mg under the tongue every 5 minutes as needed for Chest pain Dissolve 1 tab under tongue at first sign of chest pain. May repeat every 5 minutes until relief is obtained. If pain persists after taking 3 tabs in a 15-minute period, or the pain is different than is typically experienced, call 9-1-1 immediately. Current Facility-Administered Medications   Medication Dose Route Frequency Provider Last Rate Last Dose    0.9 % sodium chloride infusion   Intravenous Continuous Terrie Bond MD        sodium chloride flush 0.9 % injection 10 mL  10 mL Intravenous 2 times per day Terrie Bond MD        sodium chloride flush 0.9 % injection 10 mL  10 mL Intravenous PRN Terrie Bodn MD           Allergies:     Allergies   Allergen Reactions    Sulfa Antibiotics     Trimethoprim     Bactrim [Sulfamethoxazole-Trimethoprim] Rash       Problem List:    Patient Active Problem List   Diagnosis Code    SVT (supraventricular tachycardia) (HCC) I47.1    Breast cancer ([facility] [address] C50.919    Aortic insufficiency I35.1    CAD (coronary artery disease) I25.10    Stented coronary artery Z95.5    Essential hypertension I10    Mixed hyperlipidemia E78.2    Lumbar canal stenosis-severe at L4-L5, mild at L2-L3 and L3-L4 M48.061    Peripheral polyneuropathy G62.9    Major depressive disorder with single episode F32.9    Major depressive disorder with single episode, 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    Neuropathy G62.9    Cataract extraction status of eye, left Z98.42    Cataract extraction status of eye, right Z98.41       Past Medical History:        Diagnosis Date    Aortic insufficiency     Mild    Breast cancer (HCC)     Left, chemo and radiation    CAD (coronary artery disease)     Heart attack ([facility] [address] 09/20/2013    HTN (hypertension)     Hx of blood clots     1996 after chemo    [facility] ([facility]        Past Surgical History:        Procedure Laterality Date    BREAST SURGERY      CARDIAC SURGERY      STENTS    CATARACT REMOVAL WITH IMPLANT Left 06/04/2018    Dr. Sheba Ahn  2013    2 stents    HYSTERECTOMY N/A 07/07/2016    MASTECTOMY Left 1996    SKIN CANCER EXCISION         Social History:    Social History   Substance Use Topics    Smoking status: Former Smoker     Quit date: 1963    Smokeless tobacco: Never Used      Comment: quit 30 yrs    Alcohol use No                                Counseling given: Not Answered      Vital Signs (Current):   Vitals:    08/09/18 0842   BP: (!) 164/95   Pulse: 66   Resp: 18   Temp: 97.2 °F (36.2 °C)   TempSrc: Tympanic   SpO2: 100%   Weight: 132 lb 6.4 oz (60.1 kg)   Height: 5' 6\" (1.676 m)                                              BP Readings from Last 3 Encounters:   08/09/18 (!) 164/95   08/07/18 130/60   07/18/18 138/60       NPO Status: Time of last liquid consumption: 0720 (sip of water)                        Time of last solid consumption: 1800                        Date of last liquid consumption: 08/09/18

## 2018-08-09 NOTE — H&P
[facility]   Pre-operative History and Physical    Patient: Aranza Bunn  : 1934  Acct#: [de-identified]    History Obtained From: electronic medical record    HISTORY OF PRESENT ILLNESS  Procedure:EGD  Indications:nausea  Past Medical History:        Diagnosis Date    Aortic insufficiency     Mild    Breast cancer ([facility] [address]     Left, chemo and radiation    CAD (coronary artery disease)     Heart attack ([facility] [address] 2013    HTN (hypertension)     Hx of blood clots      after chemo    SVT (supraventricular tachycardia) (HCC)      Past Surgical History:        Procedure Laterality Date    BREAST SURGERY      CARDIAC SURGERY      STENTS    CATARACT REMOVAL WITH IMPLANT Left 2018    Dr. Mario Azevedo      2 stents    HYSTERECTOMY N/A 2016    MASTECTOMY Left     SKIN CANCER EXCISION       Medications Prior to Admission:   Current Outpatient Prescriptions on File Prior to Encounter   Medication Sig Dispense Refill    meclizine (ANTIVERT) 12.5 MG tablet Take 1 tablet by mouth 3 times daily as needed for Dizziness 30 tablet 0    amLODIPine (NORVASC) 10 MG tablet Take 10 mg by mouth daily      Omega-3 Fatty Acids (FISH OIL PO) Take 1 tablet by mouth daily      FLUoxetine HCl, PMDD, 10 MG TABS Take 5 mg by mouth daily Take 1/2 tab (2.5 mg) daily. 45 tablet 2    clonazePAM (KLONOPIN) 0.5 MG tablet Take 1/4 tablet daily. .. (Patient taking differently: 0.5 mg nightly as needed. Take 1/4 tablet daily. ..) 30 tablet 1    Multiple Vitamin (MULTI VITAMIN DAILY PO) Take by mouth Prodovite      Ascorbic Acid (VITAMIN C) 500 MG CAPS Take 1 tablet by mouth      Potassium Gluconate 550 MG TABS Take by mouth      Vitamin D (CHOLECALCIFEROL) 1000 UNITS CAPS capsule Take 1,000 Units by mouth daily      aspirin 81 MG tablet Take 81 mg by mouth daily.       nitroGLYCERIN (NITROSTAT) 0.4 MG SL tablet Place 0.4 mg under the tongue every 5 minutes as

## 2018-08-09 NOTE — ANESTHESIA POST-OP
Postoperative Anesthesia Note    Name:    Ryan Alvarado  MRN:      [id number]    Patient Vitals for the past 12 hrs:   BP Temp Temp src Pulse Resp SpO2 Height Weight   08/09/18 0947 (!) 167/58 - - 52 18 100 % - -   08/09/18 0937 (!) 140/47 - - 51 18 100 % - -   08/09/18 0927 (!) 133/43 - - 51 18 100 % - -   08/09/18 0922 (!) 124/47 - - 52 18 100 % - -   08/09/18 0917 (!) 116/37 97 °F (36.1 °C) Temporal 50 18 99 % - -   08/09/18 0842 (!) 164/95 97.2 °F (36.2 °C) Tympanic 66 18 100 % 5' 6\" (1.676 m) 132 lb 6.4 oz (60.1 kg)        LABS:    CBC  Lab Results   Component Value Date/Time    WBC 7.5 05/23/2018 03:25 PM    HGB 13.5 05/23/2018 03:25 PM    HCT 39.1 05/23/2018 03:25 PM     05/23/2018 03:25 PM     RENAL  Lab Results   Component Value Date/Time     05/23/2018 03:25 PM    K 4.2 05/23/2018 03:25 PM     05/23/2018 03:25 PM    CO2 27 05/23/2018 03:25 PM    BUN 24 (H) 05/23/2018 03:25 PM    CREATININE 0.8 05/23/2018 03:25 PM    GLUCOSE 115 (H) 05/23/2018 03:25 PM     COAGS  Lab Results   Component Value Date/Time    PROTIME 10.3 11/30/2015 06:50 AM    INR 0.91 11/30/2015 06:50 AM    APTT 39.1 (H) 11/30/2015 06:50 AM       Intake & Output: In: 370 [P.O.:120; I.V.:250]  Out: -     Nausea & Vomiting:  No    Level of Consciousness:  Awake    Pain Assessment:  Adequate analgesia    Anesthesia Complications:  No apparent anesthetic complications    SUMMARY      Vital signs stable  OK to discharge from Stage I post anesthesia care.   Care transferred from Anesthesiology department on discharge from perioperative area

## 2018-08-09 NOTE — PROCEDURES
[facility]  Endoscopy Note    Patient: Giuseppe Hurley  : 1934  Acct#: [de-identified]    Procedure: Esophagogastroduodenoscopy with biopsy    Date:  2018     Surgeon:  Taqueria Vivas MD    Referring Physician:  Patricia    Preoperative Diagnosis:  nausea    Postoperative Diagnosis:  Gastritis and possible Stark's    Anesthesia: see anesthesia note. Indications: This is a 80y.o. year old female who presents today with New-onset dyspepsia. Description of Procedure:  Informed consent was obtained from the patient after explanation of indications, benefits and possible risks and complications of the procedure. The patient was then taken to the endoscopy suite, placed in the left lateral decubitus position and the above IV sedation was administrered. The Olympus videoendoscope was placed in the patient's mouth and under direct visualization passed into the esophagus. Visualization of the esophagus demonstrated a tongue of erythema suspicious for Stark's. This was biopsied. .     The scope was then advanced into the stomach. Visualization of the gastric body and antrum demonstrated gastritis. This was biopsied. On retroflexed exam of the gastric cardia and fundus demonstrated normal..  The pylorus was patent and the scope was advanced into the duodenum. Visualization of the duodenal bulb demonstrated normal..  The second portion of the duodenum demonstrated normal..    The scope was then withdrawn back into the stomach, it was decompressed, and the scope was completely withdrawn. The patient tolerated the procedure well and was taken to the post anesthesia care unit in good condition. Estimated Blood loss:  none    Impression: Possible Stark's and gastritis      Recommendations:Await pathology. Start Omeprazole.     Taqueria Vivas MD  [facility]

## 2018-08-21 ENCOUNTER — TELEPHONE (OUTPATIENT)
Dept: INTERNAL MEDICINE CLINIC | Age: 83
End: 2018-08-21

## 2018-09-05 ENCOUNTER — OFFICE VISIT (OUTPATIENT)
Dept: INTERNAL MEDICINE CLINIC | Age: 83
End: 2018-09-05

## 2018-09-05 VITALS
WEIGHT: 134 LBS | HEART RATE: 62 BPM | BODY MASS INDEX: 21.53 KG/M2 | SYSTOLIC BLOOD PRESSURE: 130 MMHG | OXYGEN SATURATION: 96 % | HEIGHT: 66 IN | DIASTOLIC BLOOD PRESSURE: 80 MMHG | RESPIRATION RATE: 16 BRPM

## 2018-09-05 DIAGNOSIS — I25.10 CORONARY ARTERY DISEASE INVOLVING NATIVE CORONARY ARTERY OF NATIVE HEART WITHOUT ANGINA PECTORIS: Primary | ICD-10-CM

## 2018-09-05 DIAGNOSIS — H81.90 VESTIBULAR DIZZINESS: ICD-10-CM

## 2018-09-05 DIAGNOSIS — I10 ESSENTIAL HYPERTENSION: ICD-10-CM

## 2018-09-05 PROCEDURE — 99214 OFFICE O/P EST MOD 30 MIN: CPT | Performed by: INTERNAL MEDICINE

## 2018-09-05 RX ORDER — MECLIZINE HCL 12.5 MG/1
TABLET ORAL
COMMUNITY
Start: 2018-08-07 | End: 2018-09-18

## 2018-09-05 RX ORDER — MECLIZINE HYDROCHLORIDE 25 MG/1
25 TABLET ORAL 3 TIMES DAILY
Qty: 30 TABLET | Refills: 1 | Status: SHIPPED | OUTPATIENT
Start: 2018-09-05 | End: 2018-09-15

## 2018-09-05 ASSESSMENT — ENCOUNTER SYMPTOMS
CHEST TIGHTNESS: 0
SHORTNESS OF BREATH: 0
COUGH: 0
WHEEZING: 0

## 2018-09-05 NOTE — PROGRESS NOTES
Subjective:      Patient ID: Meme Segura is a 80 y.o. female. HPI  Having episodes of dizziness since she had eye surgery  3 months back  Like a fog in her head and when she closes her head has sensation of head spinning and off balance and feels week during these spells. and gets nausea with this  Has this when she looks up and standing causes symptoms   Severe intensity does not last long -she can not say how long it last butt she feels slightly like this all day and when symptoms gets worse and does not get better,she gets anxious and effects her heart symptoms-like it is jumping but no pain   Has no cns symptoms with these episodes and has no numbness of face lips and extremities and has no weakness of her extremities  Was given meclizine and did not take it properly and took only 1/2 tablet and has helped   Review of Systems   Respiratory: Negative for cough, chest tightness, shortness of breath and wheezing. Cardiovascular: Positive for palpitations. Negative for chest pain and leg swelling. Neurological: Negative for speech difficulty, weakness and numbness. Objective:   Physical Exam   Constitutional: She is oriented to person, place, and time. No distress. HENT:   Right Ear: External ear normal.   Left Ear: External ear normal.   Nose: Nose normal.   Mouth/Throat: Oropharynx is clear and moist. No oropharyngeal exudate. Eyes: Pupils are equal, round, and reactive to light. Conjunctivae and EOM are normal. No scleral icterus. Neck: No JVD present. No thyromegaly present. Cardiovascular: Normal rate, regular rhythm, normal heart sounds and intact distal pulses. Exam reveals no gallop. No murmur heard. Pulmonary/Chest: Breath sounds normal. No respiratory distress. She has no wheezes. She has no rales. Lymphadenopathy:     She has no cervical adenopathy. Neurological: She is alert and oriented to person, place, and time. No cranial nerve deficit.  She exhibits normal muscle tone. Coordination normal.   Romberg's is negative and has no focal weakness of her extremities   Nursing note and vitals reviewed. Assessment:      Symptoms appear to be related to vestibular dizziness and has no cv or cns etiology found on exam  Has no orthostatic changes in her BP      Plan:      Discussed her issue with her in detail  Start on meclizine 1 tab or 1/2 tablet 3 times daily for 10 days  Vestibular  exercises discussed and see DR. Gomez If not getting better        Brayan Sotomayor MD

## 2018-09-05 NOTE — PATIENT INSTRUCTIONS
Discussed her issue with her in detail  Start on meclizine 1 tab or 1/2 tablet 3 times daily for 10 days  Vestibular  exercises discussed and see DR. Gomez If not getting better  Patient Education        Epley Maneuver at Home for Vertigo: Exercises  Your Care Instructions  Vertigo is a spinning or whirling sensation when you move your head. Your doctor may have moved you in different positions to help your vertigo get better faster. This is called the Epley maneuver. Your doctor also may have asked you to do these exercises at home. Do the exercises as often as your doctor recommends. If your vertigo is getting worse, your doctor may have you change the exercise or stop it. Step 1  Step 1    1. Sit on the edge of a bed or sofa. Step 2    1. Turn your head 45 degrees in the direction your doctor told you to. This should be toward the ear that causes the most vertigo for you. In this picture, the woman is turning toward her left ear. Step 3    1. Tilt yourself backward until you are lying on your back. Your head should still be at a 45-degree turn. Your head should be about midway between looking straight ahead and looking out to your side. Hold for 30 seconds. If you have vertigo, stay in this position until it stops. Step 4    1. Turn your head 90 degrees toward the ear that has the least vertigo. In this picture, the woman is turning to the right because she has vertigo on her left side. The point of your chin should be raised and over your shoulder. Hold for 30 seconds. Step 5    1. Roll onto the side with the least vertigo. You should now be looking at the floor. Hold for 30 seconds. Follow-up care is a key part of your treatment and safety. Be sure to make and go to all appointments, and call your doctor if you are having problems. It's also a good idea to know your test results and keep a list of the medicines you take. Where can you learn more? Go to https://cholya.JAZD Markets. org and sign in to your W-locate account. Enter G237 in the [facility] box to learn more about \"Epley Maneuver at Home for Vertigo: Exercises. \"     If you do not have an account, please click on the \"Sign Up Now\" link. Current as of: October 9, 2017  Content Version: 11.7  © 4820-3688 Collplant. Care instructions adapted under license by [facility] ([facility]). If you have questions about a medical condition or this instruction, always ask your healthcare professional. [place] [address] any warranty or liability for your use of this information. Patient Education        Epley Maneuver for Vertigo: Exercises  Your Care Instructions  The Epley Maneuver is a series of movements your doctor may use to treat your vertigo. Here are the steps for the exercises. Your doctor or physical therapist will guide you through the movements. A single 10- to 15-minute session often is all that's needed. Crystal debris (canaliths) cause the vertigo. When your head is moved into different positions, the debris moves freely. This may cause your symptoms to stop. How to do the exercises  Step 1    2. You will sit on the doctor's exam table. Your legs will be out in front of you. The doctor or physical therapist will turn your head so that it is long-term between looking straight ahead and looking to the side that causes the worst vertigo. 3. Without changing your head position, he or she will guide you back quickly. Your shoulders will be on the table. Your head will hang over the edge of the table. At this point, the side of your head that is causing the worst vertigo will face the floor. You'll stay in this position for 30 seconds or until your symptoms stop. Step 2    2. Then, the doctor or physical therapist will turn your head to the other side. You don't need to lift your head. The other side of your head will face the floor.  You will stay in this position for 30 seconds or until your symptoms stop.  Step 3    2. The doctor or physical therapist will help you roll your body in the same direction that your head is facing. You will lie on your side. (For example, if you are looking to your right, you will roll onto your right side.) The side that causes the worst symptoms should be facing up. You'll stay in this position for another 30 seconds or until your symptoms stop. Step 4    2. The doctor or physical therapist will then help you to sit back up. Your legs will hang off the table on the same side that you were facing. Follow-up care is a key part of your treatment and safety. Be sure to make and go to all appointments, and call your doctor if you are having problems. It's also a good idea to know your test results and keep a list of the medicines you take. Where can you learn more? Go to https://DriveFactorpeDigital Troweleweb.eventblimp. org and sign in to your PanelClaw account. Enter U473 in the [employer] box to learn more about \"Epley Maneuver for Vertigo: Exercises. \"     If you do not have an account, please click on the \"Sign Up Now\" link. Current as of: May 12, 2017  Content Version: 11.7  © 0514-1308 HubCast, [facility]. Care instructions adapted under license by [facility] [employer] [facility] ([facility]). If you have questions about a medical condition or this instruction, always ask your healthcare professional. [place] [address] any warranty or liability for your use of this information. Patient Education        Cawthorne Exercises for Vertigo: Care Instructions  Your Care Instructions  Simple exercises can help you regain your balance when you have vertigo. If you have Ménière's disease, benign paroxysmal positional vertigo (BPPV), or another inner ear problem, you may have vertigo off and on. Do these exercises first thing in the morning and before you go to bed. You might get dizzy when you first start them. If this happens, try to do them for at least 5 minutes.  Do a group of exercises appointments, and call your doctor if you are having problems. It's also a good idea to know your test results and keep a list of the medicines you take. How can you care for yourself at home? · If your doctor suggests that you do Tompkins-Daroff exercises:  ¨ Sit on the edge of a bed or sofa. Quickly lie down on the side that causes the worst vertigo. Lie on your side with your ear down. ¨ Stay in this position for at least 30 seconds or until the vertigo goes away. ¨ Sit up. If this causes vertigo, wait for it to stop. ¨ Repeat the procedure on the other side. ¨ Repeat this 10 times. Do these exercises 2 times a day until the vertigo is gone. When should you call for help? Call 911 anytime you think you may need emergency care. For example, call if:    · You have symptoms of a stroke. These may include:  ¨ Sudden numbness, tingling, weakness, or loss of movement in your face, arm, or leg, especially on only one side of your body. ¨ Sudden vision changes. ¨ Sudden trouble speaking. ¨ Sudden confusion or trouble understanding simple statements. ¨ Sudden problems with walking or balance. ¨ A sudden, severe headache that is different from past headaches.    Call your doctor now or seek immediate medical care if:    · You have new or worse nausea and vomiting.     · You have new symptoms such as hearing loss or roaring in your ears.    Watch closely for changes in your health, and be sure to contact your doctor if:    · You are not getting better as expected.     · Your vertigo gets worse. Where can you learn more? Go to https://AGlobal Techolya.EventBug. org and sign in to your Anipipo account. Enter  in the [facility] box to learn more about \"Benign Paroxysmal Positional Vertigo (BPPV): Care Instructions. \"     If you do not have an account, please click on the \"Sign Up Now\" link. Current as of: May 12, 2017  Content Version: 11.7  © 6625-1865 Siteskin Web Solution, [employer] Feed.  Care For example, call if:    · You passed out (lost consciousness).     · You have symptoms of a stroke. These may include:  ¨ Sudden numbness, tingling, weakness, or loss of movement in your face, arm, or leg, especially on only one side of your body. ¨ Sudden vision changes. ¨ Sudden trouble speaking. ¨ Sudden confusion or trouble understanding simple statements. ¨ Sudden problems with walking or balance. ¨ A sudden, severe headache that is different from past headaches.    Call your doctor now or seek immediate medical care if:    · Vertigo occurs with a fever, a headache, or ringing in your ears.     · You have new or increased nausea and vomiting.    Watch closely for changes in your health, and be sure to contact your doctor if:    · Vertigo gets worse or happens more often.     · Vertigo has not gotten better after 2 weeks. Where can you learn more? Go to https://Kudanpepiceweb.FINXI. org and sign in to your proteonomix account. Enter Z659 in the Balaya box to learn more about \"Vertigo: Care Instructions. \"     If you do not have an account, please click on the \"Sign Up Now\" link. Current as of: May 12, 2017  Content Version: 11.7  © 7538-6144 Balaya, [employer]orporated. Care instructions adapted under license by [facility] ([facility]). If you have questions about a medical condition or this instruction, always ask your healthcare professional. [place] [address] any warranty or liability for your use of this information.

## 2018-09-18 ENCOUNTER — OFFICE VISIT (OUTPATIENT)
Dept: INTERNAL MEDICINE CLINIC | Age: 83
End: 2018-09-18

## 2018-09-18 ENCOUNTER — TELEPHONE (OUTPATIENT)
Dept: INTERNAL MEDICINE CLINIC | Age: 83
End: 2018-09-18

## 2018-09-18 VITALS
DIASTOLIC BLOOD PRESSURE: 80 MMHG | WEIGHT: 134 LBS | OXYGEN SATURATION: 98 % | HEART RATE: 64 BPM | HEIGHT: 66 IN | SYSTOLIC BLOOD PRESSURE: 150 MMHG | BODY MASS INDEX: 21.53 KG/M2 | RESPIRATION RATE: 14 BRPM

## 2018-09-18 DIAGNOSIS — R53.83 OTHER FATIGUE: ICD-10-CM

## 2018-09-18 DIAGNOSIS — R42 DIZZINESS: Primary | ICD-10-CM

## 2018-09-18 LAB
ANION GAP SERPL CALCULATED.3IONS-SCNC: 13 MMOL/L (ref 3–16)
BASOPHILS ABSOLUTE: 0 K/UL (ref 0–0.2)
BASOPHILS RELATIVE PERCENT: 0.7 %
BUN BLDV-MCNC: 18 MG/DL (ref 7–20)
CALCIUM SERPL-MCNC: 9.8 MG/DL (ref 8.3–10.6)
CHLORIDE BLD-SCNC: 100 MMOL/L (ref 99–110)
CO2: 29 MMOL/L (ref 21–32)
CREAT SERPL-MCNC: 1.3 MG/DL (ref 0.6–1.2)
EOSINOPHILS ABSOLUTE: 0.2 K/UL (ref 0–0.6)
EOSINOPHILS RELATIVE PERCENT: 2.8 %
GFR AFRICAN AMERICAN: 47
GFR NON-AFRICAN AMERICAN: 39
GLUCOSE BLD-MCNC: 107 MG/DL (ref 70–99)
HCT VFR BLD CALC: 38.5 % (ref 36–48)
HEMOGLOBIN: 13.2 G/DL (ref 12–16)
LYMPHOCYTES ABSOLUTE: 1.3 K/UL (ref 1–5.1)
LYMPHOCYTES RELATIVE PERCENT: 19.3 %
MCH RBC QN AUTO: 33.5 PG (ref 26–34)
MCHC RBC AUTO-ENTMCNC: 34.3 G/DL (ref 31–36)
MCV RBC AUTO: 97.5 FL (ref 80–100)
MONOCYTES ABSOLUTE: 0.6 K/UL (ref 0–1.3)
MONOCYTES RELATIVE PERCENT: 8.7 %
NEUTROPHILS ABSOLUTE: 4.8 K/UL (ref 1.7–7.7)
NEUTROPHILS RELATIVE PERCENT: 68.5 %
PDW BLD-RTO: 12.9 % (ref 12.4–15.4)
PLATELET # BLD: 327 K/UL (ref 135–450)
PMV BLD AUTO: 9 FL (ref 5–10.5)
POTASSIUM SERPL-SCNC: 4.4 MMOL/L (ref 3.5–5.1)
RBC # BLD: 3.95 M/UL (ref 4–5.2)
SODIUM BLD-SCNC: 142 MMOL/L (ref 136–145)
TSH REFLEX: 1.28 UIU/ML (ref 0.27–4.2)
WBC # BLD: 7 K/UL (ref 4–11)

## 2018-09-18 PROCEDURE — 36415 COLL VENOUS BLD VENIPUNCTURE: CPT | Performed by: INTERNAL MEDICINE

## 2018-09-18 PROCEDURE — 99213 OFFICE O/P EST LOW 20 MIN: CPT | Performed by: INTERNAL MEDICINE

## 2018-09-18 NOTE — PROGRESS NOTES
Subjective:      Patient ID: Elle Delcid is a 80 y.o. female. [facility]  Jagruti Guerrero is here for further evaluation of dizziness. Please see Dr. Arpan Wallace note of . The dizziness has occurred since her eye surgery in . She is essentially ok at rest but has dizziness when stands/walks. The dizziness is not near-syncopal nor vertiginous but more of a sense of imbalance. It was not helped by Meclizine. She has no other neurologic symptoms. It does though create anxiety and associated symptoms of racing heart related to that. Review of Systems   Eyes: Negative for visual disturbance. Neurological: Positive for dizziness. Negative for speech difficulty, weakness and headaches. Psychiatric/Behavioral: The patient is nervous/anxious. Objective:   Physical Exam   Constitutional: She is oriented to person, place, and time. She appears well-developed and well-nourished. Eyes: Pupils are equal, round, and reactive to light. EOM are normal.   Neck: No JVD present. Cardiovascular: Normal rate, regular rhythm and normal heart sounds. Pulmonary/Chest: Effort normal and breath sounds normal. No respiratory distress. Neurological: She is alert and oriented to person, place, and time. No cranial nerve deficit. Skin: Skin is warm and dry. BP right supine: 160/80        Right standin/80    Assessment:      1. Dizziness    2. Other fatigue            Plan:      Magalys Uriostegui was seen today for dizziness. Diagnoses and all orders for this visit:    Dizziness, etiology unclear  -     CBC Auto Differential  -     Basic Metabolic Panel    Other fatigue  -     TSH with Reflex; Future  -     TSH with Reflex    Recommed that she see her eye doctor for follow-up.           Gee Lopez MD

## 2018-09-21 ENCOUNTER — OFFICE VISIT (OUTPATIENT)
Dept: PSYCHIATRY | Age: 83
End: 2018-09-21

## 2018-09-21 VITALS
DIASTOLIC BLOOD PRESSURE: 69 MMHG | SYSTOLIC BLOOD PRESSURE: 149 MMHG | HEIGHT: 66 IN | WEIGHT: 135 LBS | BODY MASS INDEX: 21.69 KG/M2 | HEART RATE: 65 BPM

## 2018-09-21 DIAGNOSIS — F32.A DEPRESSION, UNSPECIFIED DEPRESSION TYPE: Primary | ICD-10-CM

## 2018-09-21 DIAGNOSIS — F41.9 ANXIETY: ICD-10-CM

## 2018-09-21 PROCEDURE — 99214 OFFICE O/P EST MOD 30 MIN: CPT | Performed by: PSYCHIATRY & NEUROLOGY

## 2018-09-21 RX ORDER — FLUOXETINE 10 MG/1
5 TABLET ORAL DAILY
Qty: 45 TABLET | Refills: 2 | Status: SHIPPED | OUTPATIENT
Start: 2018-09-21 | End: 2018-11-16 | Stop reason: SDUPTHER

## 2018-09-21 RX ORDER — CLONAZEPAM 0.5 MG/1
TABLET ORAL
Qty: 30 TABLET | Refills: 1 | Status: SHIPPED | OUTPATIENT
Start: 2018-09-21 | End: 2018-11-16 | Stop reason: SDUPTHER

## 2018-09-21 NOTE — PROGRESS NOTES
Psych Follow Up Progress Note    9/21/18  Kyle Elam  [id number]    I have reviewed recent documentation:  Kyle Elam is a 80 y.o. female  This patient  has a past medical history of Aortic insufficiency; Breast cancer ([facility] [address] CAD (coronary artery disease); Heart attack ([facility] [address] HTN (hypertension); Hx of blood clots; and SVT (supraventricular tachycardia) ([facility] [address] Chief Complaint   Patient presents with    Other       Subjective/Interval Hx:    Objective:  Vitals:    09/21/18 1429   BP: (!) 149/69   Pulse: 65   Weight: 135 lb (61.2 kg)   Height: 5' 6\" (1.676 m)        Body mass index is 21.79 kg/m².       Recent Results (from the past 168 hour(s))   CBC Auto Differential    Collection Time: 09/18/18  3:10 PM   Result Value Ref Range    WBC 7.0 4.0 - 11.0 K/uL    RBC 3.95 (L) 4.00 - 5.20 M/uL    Hemoglobin 13.2 12.0 - 16.0 g/dL    Hematocrit 38.5 36.0 - 48.0 %    MCV 97.5 80.0 - 100.0 fL    MCH 33.5 26.0 - 34.0 pg    MCHC 34.3 31.0 - 36.0 g/dL    RDW 12.9 12.4 - 15.4 %    Platelets 245 995 - 431 K/uL    MPV 9.0 5.0 - 10.5 fL    Neutrophils % 68.5 %    Lymphocytes % 19.3 %    Monocytes % 8.7 %    Eosinophils % 2.8 %    Basophils % 0.7 %    Neutrophils # 4.8 1.7 - 7.7 K/uL    Lymphocytes # 1.3 1.0 - 5.1 K/uL    Monocytes # 0.6 0.0 - 1.3 K/uL    Eosinophils # 0.2 0.0 - 0.6 K/uL    Basophils # 0.0 0.0 - 0.2 K/uL   Basic Metabolic Panel    Collection Time: 09/18/18  3:10 PM   Result Value Ref Range    Sodium 142 136 - 145 mmol/L    Potassium 4.4 3.5 - 5.1 mmol/L    Chloride 100 99 - 110 mmol/L    CO2 29 21 - 32 mmol/L    Anion Gap 13 3 - 16    Glucose 107 (H) 70 - 99 mg/dL    BUN 18 7 - 20 mg/dL    CREATININE 1.3 (H) 0.6 - 1.2 mg/dL    GFR Non-African American 39 (A) >60    GFR  47 (A) >60    Calcium 9.8 8.3 - 10.6 mg/dL   TSH with Reflex    Collection Time: 09/18/18  3:10 PM   Result Value Ref Range    TSH 1.28 0.27 - 4.20 uIU/mL       Current Outpatient Prescriptions   Medication Sig Dispense Refill    amLODIPine (NORVASC) 10 MG tablet Take 10 mg by mouth daily      Omega-3 Fatty Acids (FISH OIL PO) Take 1 tablet by mouth daily      FLUoxetine HCl, PMDD, 10 MG TABS Take 5 mg by mouth daily Take 1/2 tab (2.5 mg) daily. 45 tablet 2    clonazePAM (KLONOPIN) 0.5 MG tablet Take 1/4 tablet daily. .. (Patient taking differently: 0.5 mg nightly as needed. Take 1/4 tablet daily. ..) 30 tablet 1    nitroGLYCERIN (NITROSTAT) 0.4 MG SL tablet Place 0.4 mg under the tongue every 5 minutes as needed for Chest pain Dissolve 1 tab under tongue at first sign of chest pain. May repeat every 5 minutes until relief is obtained. If pain persists after taking 3 tabs in a 15-minute period, or the pain is different than is typically experienced, call 9-1-1 immediately.  Multiple Vitamin (MULTI VITAMIN DAILY PO) Take by mouth Prodovite      Ascorbic Acid (VITAMIN C) 500 MG CAPS Take 1 tablet by mouth      Potassium Gluconate 550 MG TABS Take by mouth      Vitamin D (CHOLECALCIFEROL) 1000 UNITS CAPS capsule Take 1,000 Units by mouth daily      aspirin 81 MG tablet Take 81 mg by mouth daily. No current facility-administered medications for this visit. Facility-Administered Medications Ordered in Other Visits   Medication Dose Route Frequency Provider Last Rate Last Dose    0.9 % sodium chloride infusion   Intravenous Continuous Michi Cedillo MD   Stopped at 08/09/18 0947    sodium chloride flush 0.9 % injection 10 mL  10 mL Intravenous 2 times per day Michi Cedillo MD        sodium chloride flush 0.9 % injection 10 mL  10 mL Intravenous PRN Michi Cedillo MD           ROS:  No tremor, gait nl    MSE:  A-casually dressed, good EC, pleasant and engageable  A-tearful  M-grieving  S-grossly A + O  I/J-fair/fair  T-linear, goal-directed.  Speech c nl r/t/v/a.  No S/H I, no A/V H.      Controlled Substances Monitoring:      The Prescription Monitoring Report for this patient was reviewed today.  Koko Lambert Atiya Powell MD)     No signs of potential drug abuse or diversion identified. Billy Hoffman MD)        A/P     1. OCPD traits, anxiety NOS-Prozac 5 mg qd and klonopin 0.25 bid prn. Robert Laboy Dr. [id number]. Sylvia Jacob therapist Carry Barefoot.

## 2018-10-04 ENCOUNTER — NURSE ONLY (OUTPATIENT)
Dept: INTERNAL MEDICINE CLINIC | Age: 83
End: 2018-10-04
Payer: MEDICARE

## 2018-10-04 DIAGNOSIS — N28.9 FUNCTION KIDNEY DECREASED: Primary | ICD-10-CM

## 2018-10-04 LAB
ANION GAP SERPL CALCULATED.3IONS-SCNC: 16 MMOL/L (ref 3–16)
BUN BLDV-MCNC: 18 MG/DL (ref 7–20)
CALCIUM SERPL-MCNC: 9.9 MG/DL (ref 8.3–10.6)
CHLORIDE BLD-SCNC: 100 MMOL/L (ref 99–110)
CO2: 26 MMOL/L (ref 21–32)
CREAT SERPL-MCNC: 0.9 MG/DL (ref 0.6–1.2)
GFR AFRICAN AMERICAN: >60
GFR NON-AFRICAN AMERICAN: 60
GLUCOSE BLD-MCNC: 111 MG/DL (ref 70–99)
POTASSIUM SERPL-SCNC: 4.5 MMOL/L (ref 3.5–5.1)
SODIUM BLD-SCNC: 142 MMOL/L (ref 136–145)

## 2018-10-04 PROCEDURE — 36415 COLL VENOUS BLD VENIPUNCTURE: CPT | Performed by: INTERNAL MEDICINE

## 2018-10-15 ENCOUNTER — HOSPITAL ENCOUNTER (OUTPATIENT)
Dept: WOMENS IMAGING | Age: 83
Discharge: HOME | End: 2018-10-15
Payer: MEDICARE

## 2018-10-15 DIAGNOSIS — Z12.39 BREAST CANCER SCREENING: ICD-10-CM

## 2018-10-15 PROCEDURE — 77063 BREAST TOMOSYNTHESIS BI: CPT

## 2018-10-24 ENCOUNTER — OFFICE VISIT (OUTPATIENT)
Dept: INTERNAL MEDICINE CLINIC | Age: 83
End: 2018-10-24
Payer: MEDICARE

## 2018-10-24 ENCOUNTER — HOSPITAL ENCOUNTER (EMERGENCY)
Age: 83
Discharge: HOME | End: 2018-10-24
Payer: MEDICARE

## 2018-10-24 ENCOUNTER — APPOINTMENT (OUTPATIENT)
Dept: GENERAL RADIOLOGY | Age: 83
End: 2018-10-24
Payer: MEDICARE

## 2018-10-24 VITALS
SYSTOLIC BLOOD PRESSURE: 182 MMHG | BODY MASS INDEX: 21.86 KG/M2 | HEART RATE: 50 BPM | WEIGHT: 136 LBS | OXYGEN SATURATION: 96 % | RESPIRATION RATE: 14 BRPM | HEIGHT: 66 IN | TEMPERATURE: 97.3 F | DIASTOLIC BLOOD PRESSURE: 88 MMHG

## 2018-10-24 VITALS
OXYGEN SATURATION: 99 % | RESPIRATION RATE: 13 BRPM | WEIGHT: 135.36 LBS | TEMPERATURE: 97.5 F | DIASTOLIC BLOOD PRESSURE: 63 MMHG | BODY MASS INDEX: 21.75 KG/M2 | SYSTOLIC BLOOD PRESSURE: 168 MMHG | HEIGHT: 66 IN | HEART RATE: 56 BPM

## 2018-10-24 DIAGNOSIS — M54.2 NECK PAIN, BILATERAL: Primary | ICD-10-CM

## 2018-10-24 DIAGNOSIS — I15.9 SECONDARY HYPERTENSION: ICD-10-CM

## 2018-10-24 DIAGNOSIS — R42 DIZZINESS: ICD-10-CM

## 2018-10-24 DIAGNOSIS — Z86.79 HISTORY OF HYPERTENSION: ICD-10-CM

## 2018-10-24 DIAGNOSIS — R42 DIZZINESS: Primary | ICD-10-CM

## 2018-10-24 LAB
A/G RATIO: 1.4 (ref 1.1–2.2)
ALBUMIN SERPL-MCNC: 5.1 G/DL (ref 3.4–5)
ALP BLD-CCNC: 83 U/L (ref 40–129)
ALT SERPL-CCNC: 20 U/L (ref 10–40)
ANION GAP SERPL CALCULATED.3IONS-SCNC: 12 MMOL/L (ref 3–16)
AST SERPL-CCNC: 26 U/L (ref 15–37)
BASOPHILS ABSOLUTE: 0 K/UL (ref 0–0.2)
BASOPHILS RELATIVE PERCENT: 0.4 %
BILIRUB SERPL-MCNC: 0.5 MG/DL (ref 0–1)
BILIRUBIN URINE: NEGATIVE
BLOOD, URINE: NEGATIVE
BUN BLDV-MCNC: 12 MG/DL (ref 7–20)
CALCIUM SERPL-MCNC: 10.2 MG/DL (ref 8.3–10.6)
CHLORIDE BLD-SCNC: 100 MMOL/L (ref 99–110)
CLARITY: CLEAR
CO2: 28 MMOL/L (ref 21–32)
COLOR: YELLOW
CREAT SERPL-MCNC: 0.7 MG/DL (ref 0.6–1.2)
EOSINOPHILS ABSOLUTE: 0.1 K/UL (ref 0–0.6)
EOSINOPHILS RELATIVE PERCENT: 1.4 %
GFR AFRICAN AMERICAN: >60
GFR NON-AFRICAN AMERICAN: >60
GLOBULIN: 3.6 G/DL
GLUCOSE BLD-MCNC: 112 MG/DL (ref 70–99)
GLUCOSE URINE: NEGATIVE MG/DL
HCT VFR BLD CALC: 43.8 % (ref 36–48)
HEMOGLOBIN: 14.6 G/DL (ref 12–16)
INR BLD: 1 (ref 0.86–1.14)
KETONES, URINE: NEGATIVE MG/DL
LEUKOCYTE ESTERASE, URINE: NEGATIVE
LYMPHOCYTES ABSOLUTE: 1.4 K/UL (ref 1–5.1)
LYMPHOCYTES RELATIVE PERCENT: 18.7 %
MCH RBC QN AUTO: 32.4 PG (ref 26–34)
MCHC RBC AUTO-ENTMCNC: 33.3 G/DL (ref 31–36)
MCV RBC AUTO: 97.3 FL (ref 80–100)
MICROSCOPIC EXAMINATION: NORMAL
MONOCYTES ABSOLUTE: 0.7 K/UL (ref 0–1.3)
MONOCYTES RELATIVE PERCENT: 9 %
NEUTROPHILS ABSOLUTE: 5.2 K/UL (ref 1.7–7.7)
NEUTROPHILS RELATIVE PERCENT: 70.5 %
NITRITE, URINE: NEGATIVE
PDW BLD-RTO: 13.1 % (ref 12.4–15.4)
PH UA: 7.5
PLATELET # BLD: 335 K/UL (ref 135–450)
PMV BLD AUTO: 8.2 FL (ref 5–10.5)
POTASSIUM SERPL-SCNC: 3.8 MMOL/L (ref 3.5–5.1)
PROTEIN UA: NEGATIVE MG/DL
PROTHROMBIN TIME: 11.4 SEC (ref 9.8–13)
RBC # BLD: 4.5 M/UL (ref 4–5.2)
SODIUM BLD-SCNC: 140 MMOL/L (ref 136–145)
SPECIFIC GRAVITY UA: <1.005
TOTAL PROTEIN: 8.7 G/DL (ref 6.4–8.2)
TROPONIN: <0.01 NG/ML
TROPONIN: <0.01 NG/ML
URINE REFLEX TO CULTURE: NORMAL
URINE TYPE: NORMAL
UROBILINOGEN, URINE: 0.2 E.U./DL
WBC # BLD: 7.4 K/UL (ref 4–11)

## 2018-10-24 PROCEDURE — 36415 COLL VENOUS BLD VENIPUNCTURE: CPT

## 2018-10-24 PROCEDURE — 85025 COMPLETE CBC W/AUTO DIFF WBC: CPT

## 2018-10-24 PROCEDURE — 93005 ELECTROCARDIOGRAM TRACING: CPT | Performed by: EMERGENCY MEDICINE

## 2018-10-24 PROCEDURE — 99214 OFFICE O/P EST MOD 30 MIN: CPT | Performed by: NURSE PRACTITIONER

## 2018-10-24 PROCEDURE — 81003 URINALYSIS AUTO W/O SCOPE: CPT

## 2018-10-24 PROCEDURE — 80053 COMPREHEN METABOLIC PANEL: CPT

## 2018-10-24 PROCEDURE — 93005 ELECTROCARDIOGRAM TRACING: CPT | Performed by: NURSE PRACTITIONER

## 2018-10-24 PROCEDURE — 85610 PROTHROMBIN TIME: CPT

## 2018-10-24 PROCEDURE — 71046 X-RAY EXAM CHEST 2 VIEWS: CPT

## 2018-10-24 PROCEDURE — 99284 EMERGENCY DEPT VISIT MOD MDM: CPT

## 2018-10-24 PROCEDURE — 84484 ASSAY OF TROPONIN QUANT: CPT

## 2018-10-24 ASSESSMENT — ENCOUNTER SYMPTOMS
STRIDOR: 0
GASTROINTESTINAL NEGATIVE: 1
SORE THROAT: 0
CHEST TIGHTNESS: 0
WHEEZING: 0
ABDOMINAL DISTENTION: 0
NAUSEA: 0
DIARRHEA: 0
SHORTNESS OF BREATH: 0
SWOLLEN GLANDS: 0
ABDOMINAL PAIN: 0
VOMITING: 0
SINUS PRESSURE: 0
COUGH: 0
CHANGE IN BOWEL HABIT: 0
RHINORRHEA: 0
RESPIRATORY NEGATIVE: 1
SINUS PAIN: 0
VISUAL CHANGE: 1
CHOKING: 0

## 2018-10-24 ASSESSMENT — PAIN SCALES - GENERAL
PAINLEVEL_OUTOF10: 0
PAINLEVEL_OUTOF10: 0

## 2018-10-24 NOTE — PROGRESS NOTES
fatigue, neck pain, vertigo and a visual change. Pertinent negatives include no abdominal pain, anorexia, arthralgias, change in bowel habit, chest pain, congestion, coughing, fever, headaches, joint swelling, myalgias, nausea, numbness, rash, sore throat, swollen glands, urinary symptoms, vomiting or weakness. The symptoms are aggravated by bending. She has tried nothing for the symptoms. All questions addressed and answered. Patient agrees with plan of care. Past Medical History:   Diagnosis Date    Aortic insufficiency     Mild    Breast cancer (HCC)     Left, chemo and radiation    CAD (coronary artery disease)     Heart attack ([facility] [address] 09/20/2013    HTN (hypertension)     Hx of blood clots     1996 after chemo    SVT (supraventricular tachycardia) ([facility] [address]        Past Surgical History:   Procedure Laterality Date    BREAST SURGERY      CARDIAC SURGERY      STENTS    CATARACT REMOVAL WITH IMPLANT Left 06/04/2018    Dr. Phoenix Wagner  2013    2 stents    HYSTERECTOMY N/A 07/07/2016    MASTECTOMY Left 1996    SKIN CANCER EXCISION      UPPER GASTROINTESTINAL ENDOSCOPY  08/09/2018    Dr David Every, biopsies       Allergies   Allergen Reactions    Sulfa Antibiotics     Trimethoprim     Bactrim [Sulfamethoxazole-Trimethoprim] Rash       Outpatient Prescriptions Marked as Taking for the 10/24/18 encounter (Office Visit) with SUNIL Soto CNP   Medication Sig Dispense Refill    FLUoxetine HCl, PMDD, 10 MG TABS Take 5 mg by mouth daily Take 1/2 tab (2.5 mg) daily. 45 tablet 2    clonazePAM (KLONOPIN) 0.5 MG tablet Take 1/4 tablet daily. .. 30 tablet 1    amLODIPine (NORVASC) 10 MG tablet Take 10 mg by mouth daily      Omega-3 Fatty Acids (FISH OIL PO) Take 1 tablet by mouth daily      nitroGLYCERIN (NITROSTAT) 0.4 MG SL tablet Place 0.4 mg under the tongue every 5 minutes as needed for Chest pain Dissolve 1 tab under tongue at first sign of chest

## 2018-10-24 NOTE — ED PROVIDER NOTES
[address]  [address] [facility] [address]  Dept: [phone]  Loc: [phone]  eMERGENCYdEPARTMENT eNCOUnter      Pt Name: Janice Carter  MRN: [id number]  [place] 1934  Date of evaluation: 10/24/2018  Provider:Sara Dean, SUNIL - CNP     Evaluated by the advanced practice provider    [address] [place] [address]       Chief Complaint   Patient presents with    Dizziness     pt c/o dizziness and \"funny feeling\" in throat since last night, then chills and body aches upon waking. pt denies chest pain. states sent by pcp       CRITICAL CARE TIME       HISTORY OF PRESENT ILLNESS  (Location/Symptom, Timing/Onset, Context/Setting, Quality, Duration,Modifying Factors, Severity.)   Janice Carter is a 80 y.o. female who presents to the emergency department    Sent to the emergency department from Dr. Xenia Green office by nurse practitioner Balbir Grant. Patient's reporting or she's had dizziness, fatigue, chills and sweating without fever, flulike symptoms without cough, nasal congestion since July, but today at Dr. Xenia Green office she reported to them that she had an odd squiggly sensation in her throat for about a minute last night laying in bed and this seemed to be similar to that as symptoms that she was having many years ago when she had a heart attack in [place]. The sensation in her throat only lasted a minute. She is currently not having that sensation in her throat. She has no chest pain. No cough. No fever. No chills. No nausea. No abdominal pain. No syncope, or near-syncope. She did have an MI several years ago with stent ×2 placement. Dr. Sheriff Has is her cardiologist at [facility]. She reports despite all of these symptoms that she's had since July she last seen Dr. Sheriff Has in April 2018 and has not contacted him to let him know that she's having all of these other symptoms since July.       Nursing Notes were reviewedand

## 2018-10-24 NOTE — ED TRIAGE NOTES
Pt to ED with c/o dizziness. Pt states she had been having anxiety, flu, cold, chills, nausea. Pt states last night she had a sensation in her throat, the same feeling that she had when she has her heart attack. States she went to her PCP office today and they suggested her come to the ER.

## 2018-10-25 ASSESSMENT — HEART SCORE: ECG: 0

## 2018-10-26 LAB
EKG ATRIAL RATE: 55 BPM
EKG ATRIAL RATE: 58 BPM
EKG DIAGNOSIS: NORMAL
EKG DIAGNOSIS: NORMAL
EKG P AXIS: 62 DEGREES
EKG P-R INTERVAL: 360 MS
EKG Q-T INTERVAL: 462 MS
EKG Q-T INTERVAL: 494 MS
EKG QRS DURATION: 108 MS
EKG QRS DURATION: 116 MS
EKG QTC CALCULATION (BAZETT): 453 MS
EKG QTC CALCULATION (BAZETT): 472 MS
EKG R AXIS: -32 DEGREES
EKG R AXIS: -35 DEGREES
EKG T AXIS: 40 DEGREES
EKG T AXIS: 56 DEGREES
EKG VENTRICULAR RATE: 55 BPM
EKG VENTRICULAR RATE: 58 BPM

## 2018-10-26 PROCEDURE — 93010 ELECTROCARDIOGRAM REPORT: CPT | Performed by: INTERNAL MEDICINE

## 2018-11-02 ENCOUNTER — TELEPHONE (OUTPATIENT)
Dept: INTERNAL MEDICINE CLINIC | Age: 83
End: 2018-11-02

## 2018-11-02 RX ORDER — LISINOPRIL 10 MG/1
10 TABLET ORAL DAILY
Qty: 30 TABLET | Refills: 3 | Status: SHIPPED | OUTPATIENT
Start: 2018-11-02

## 2018-11-16 ENCOUNTER — TELEPHONE (OUTPATIENT)
Dept: PSYCHIATRY | Age: 83
End: 2018-11-16

## 2018-11-16 ENCOUNTER — OFFICE VISIT (OUTPATIENT)
Dept: PSYCHIATRY | Age: 83
End: 2018-11-16
Payer: MEDICARE

## 2018-11-16 VITALS
HEIGHT: 66 IN | DIASTOLIC BLOOD PRESSURE: 82 MMHG | BODY MASS INDEX: 22.5 KG/M2 | HEART RATE: 68 BPM | SYSTOLIC BLOOD PRESSURE: 120 MMHG | WEIGHT: 140 LBS

## 2018-11-16 DIAGNOSIS — F41.9 ANXIETY: ICD-10-CM

## 2018-11-16 DIAGNOSIS — F32.A DEPRESSION, UNSPECIFIED DEPRESSION TYPE: Primary | ICD-10-CM

## 2018-11-16 PROCEDURE — 99214 OFFICE O/P EST MOD 30 MIN: CPT | Performed by: PSYCHIATRY & NEUROLOGY

## 2018-11-16 RX ORDER — CLONAZEPAM 0.5 MG/1
TABLET ORAL
Qty: 30 TABLET | Refills: 1 | Status: SHIPPED | OUTPATIENT
Start: 2018-11-16 | End: 2019-03-22 | Stop reason: SDUPTHER

## 2018-11-16 RX ORDER — FLUOXETINE 10 MG/1
5 TABLET ORAL DAILY
Qty: 45 TABLET | Refills: 2 | Status: SHIPPED | OUTPATIENT
Start: 2018-11-16 | End: 2019-03-22 | Stop reason: SDUPTHER

## 2018-11-16 NOTE — PROGRESS NOTES
(CHOLECALCIFEROL) 1000 UNITS CAPS capsule Take 1,000 Units by mouth daily      aspirin 81 MG tablet Take 81 mg by mouth daily. No current facility-administered medications for this visit. ROS:  No tremor, gait nl    MSE:    A-casually dressed, good EC, pleasant and engageable  A-generally full  M-improving depression. S-grossly A + O  I/J-fair/fair  T-linear, goal-directed.  Speech c nl r/t/v/a.  No S/H I, no A/V H.      Controlled Substances Monitoring:      The Prescription Monitoring Report for this patient was reviewed today. Michael Moreno MD)     No signs of potential drug abuse or diversion identified. Michael Moreno MD)     A/P     1. OCPD traits, anxiety NOS-Prozac 5 mg qd and klonopin 0.25 bid prn. Chriss Bonner Dr. [phone]. Kathi Blackwood therapist Brooke Adan, although hasn't seen her because of the distance.

## 2018-11-27 ENCOUNTER — OFFICE VISIT (OUTPATIENT)
Dept: ORTHOPEDIC SURGERY | Age: 83
End: 2018-11-27
Payer: MEDICARE

## 2018-11-27 VITALS
HEART RATE: 61 BPM | BODY MASS INDEX: 21.69 KG/M2 | WEIGHT: 135 LBS | SYSTOLIC BLOOD PRESSURE: 168 MMHG | DIASTOLIC BLOOD PRESSURE: 77 MMHG | HEIGHT: 66 IN

## 2018-11-27 DIAGNOSIS — G89.29 CHRONIC PAIN OF RIGHT KNEE: Primary | ICD-10-CM

## 2018-11-27 DIAGNOSIS — M25.561 CHRONIC PAIN OF RIGHT KNEE: Primary | ICD-10-CM

## 2018-11-27 PROCEDURE — 99203 OFFICE O/P NEW LOW 30 MIN: CPT | Performed by: ORTHOPAEDIC SURGERY

## 2018-11-27 NOTE — PROGRESS NOTES
Patient Name: Pavan Ramirez  : 1934  DOS: 2018        Chief Complaint:   Chief Complaint   Patient presents with    Knee Pain     Right knee       History of Present Illness:  Pavan Ramirez is a 80 y.o. female who presents with a chief complaint of continued complaints of pain in the knee with irritation with walking, stairs and with overuse. Pain in the knee regularly with swelling and discomfort. Increase in pain over the past several years time.  passed away earlier this year. Pain in the knee with limited walking but overall diong well with activites. Deformity not significant ly icreased. Medical History  Current Medications:   Prior to Admission medications    Medication Sig Start Date End Date Taking? Authorizing Provider   clonazePAM (KLONOPIN) 0.5 MG tablet Take 1/4 tablet daily. .. 18  Juan Carlos Martins MD   FLUoxetine HCl, PMDD, 10 MG TABS Take 5 mg by mouth daily Take 1/2 tab (2.5 mg) daily. 18   Juan Carlos Martins MD   lisinopril (PRINIVIL;ZESTRIL) 10 MG tablet Take 1 tablet by mouth daily 18   Hany Moran MD   amLODIPine (NORVASC) 10 MG tablet Take 10 mg by mouth daily    Historical Provider, MD   Omega-3 Fatty Acids (FISH OIL PO) Take 1 tablet by mouth daily    Historical Provider, MD   nitroGLYCERIN (NITROSTAT) 0.4 MG SL tablet Place 0.4 mg under the tongue every 5 minutes as needed for Chest pain Dissolve 1 tab under tongue at first sign of chest pain. May repeat every 5 minutes until relief is obtained. If pain persists after taking 3 tabs in a 15-minute period, or the pain is different than is typically experienced, call 9-1-1 immediately.     Historical Provider, MD   Multiple Vitamin (MULTI VITAMIN DAILY PO) Take by mouth Prodovite    Historical Provider, MD   Ascorbic Acid (VITAMIN C) 500 MG CAPS Take 1 tablet by mouth    Historical Provider, MD   Potassium Gluconate 550 MG TABS Take by mouth    Historical

## 2018-11-29 ENCOUNTER — TELEPHONE (OUTPATIENT)
Dept: ORTHOPEDIC SURGERY | Age: 83
End: 2018-11-29

## 2018-11-29 NOTE — TELEPHONE ENCOUNTER
Physician would like patient to receive an OA reaction brace. Tried to contact patient to set up a time to fit brace and patient did not answer. LMOM for patient to contact [facility].

## 2018-12-03 DIAGNOSIS — M25.561 RIGHT KNEE PAIN, UNSPECIFIED CHRONICITY: Primary | ICD-10-CM

## 2018-12-03 PROCEDURE — L1812 KO ELASTIC W/JOINTS PRE OTS: HCPCS | Performed by: ORTHOPAEDIC SURGERY

## 2018-12-26 ENCOUNTER — OFFICE VISIT (OUTPATIENT)
Dept: INTERNAL MEDICINE CLINIC | Age: 83
End: 2018-12-26
Payer: MEDICARE

## 2018-12-26 VITALS
DIASTOLIC BLOOD PRESSURE: 71 MMHG | HEART RATE: 65 BPM | WEIGHT: 136 LBS | RESPIRATION RATE: 14 BRPM | OXYGEN SATURATION: 93 % | SYSTOLIC BLOOD PRESSURE: 155 MMHG | BODY MASS INDEX: 21.86 KG/M2 | HEIGHT: 66 IN

## 2018-12-26 DIAGNOSIS — I10 ESSENTIAL HYPERTENSION: Primary | ICD-10-CM

## 2018-12-26 PROCEDURE — 99213 OFFICE O/P EST LOW 20 MIN: CPT | Performed by: INTERNAL MEDICINE

## 2018-12-26 ASSESSMENT — PATIENT HEALTH QUESTIONNAIRE - PHQ9
1. LITTLE INTEREST OR PLEASURE IN DOING THINGS: 2
SUM OF ALL RESPONSES TO PHQ QUESTIONS 1-9: 3
SUM OF ALL RESPONSES TO PHQ9 QUESTIONS 1 & 2: 3
2. FEELING DOWN, DEPRESSED OR HOPELESS: 1
SUM OF ALL RESPONSES TO PHQ QUESTIONS 1-9: 3

## 2018-12-26 ASSESSMENT — ENCOUNTER SYMPTOMS: RESPIRATORY NEGATIVE: 1

## 2019-01-22 ENCOUNTER — OFFICE VISIT (OUTPATIENT)
Dept: INTERNAL MEDICINE CLINIC | Age: 84
End: 2019-01-22
Payer: MEDICARE

## 2019-01-22 VITALS
RESPIRATION RATE: 16 BRPM | HEIGHT: 66 IN | HEART RATE: 68 BPM | BODY MASS INDEX: 22.02 KG/M2 | WEIGHT: 137 LBS | SYSTOLIC BLOOD PRESSURE: 134 MMHG | DIASTOLIC BLOOD PRESSURE: 60 MMHG

## 2019-01-22 DIAGNOSIS — G62.9 PERIPHERAL POLYNEUROPATHY: Primary | ICD-10-CM

## 2019-01-22 PROCEDURE — 99213 OFFICE O/P EST LOW 20 MIN: CPT | Performed by: INTERNAL MEDICINE

## 2019-01-23 DIAGNOSIS — G62.9 PERIPHERAL POLYNEUROPATHY: ICD-10-CM

## 2019-01-23 LAB
FOLATE: >20 NG/ML (ref 4.78–24.2)
VITAMIN B-12: 614 PG/ML (ref 211–911)

## 2019-01-26 LAB — VITAMIN B6: 371.2 NMOL/L (ref 20–125)

## 2019-02-13 ENCOUNTER — OFFICE VISIT (OUTPATIENT)
Dept: INTERNAL MEDICINE CLINIC | Age: 84
End: 2019-02-13
Payer: MEDICARE

## 2019-02-13 VITALS
BODY MASS INDEX: 22.18 KG/M2 | WEIGHT: 138 LBS | HEIGHT: 66 IN | DIASTOLIC BLOOD PRESSURE: 66 MMHG | HEART RATE: 57 BPM | SYSTOLIC BLOOD PRESSURE: 157 MMHG

## 2019-02-13 DIAGNOSIS — R53.82 CHRONIC FATIGUE: ICD-10-CM

## 2019-02-13 DIAGNOSIS — R30.0 DYSURIA: ICD-10-CM

## 2019-02-13 DIAGNOSIS — R42 DIZZINESS: Primary | ICD-10-CM

## 2019-02-13 LAB
A/G RATIO: 1.6 (ref 1.1–2.2)
ALBUMIN SERPL-MCNC: 4.7 G/DL (ref 3.4–5)
ALP BLD-CCNC: 75 U/L (ref 40–129)
ALT SERPL-CCNC: 15 U/L (ref 10–40)
ANION GAP SERPL CALCULATED.3IONS-SCNC: 17 MMOL/L (ref 3–16)
AST SERPL-CCNC: 22 U/L (ref 15–37)
BASOPHILS ABSOLUTE: 0.1 K/UL (ref 0–0.2)
BASOPHILS RELATIVE PERCENT: 0.7 %
BILIRUB SERPL-MCNC: 0.5 MG/DL (ref 0–1)
BILIRUBIN, POC: NEGATIVE
BLOOD URINE, POC: NEGATIVE
BUN BLDV-MCNC: 14 MG/DL (ref 7–20)
CALCIUM SERPL-MCNC: 10.1 MG/DL (ref 8.3–10.6)
CHLORIDE BLD-SCNC: 102 MMOL/L (ref 99–110)
CLARITY, POC: NEGATIVE
CO2: 25 MMOL/L (ref 21–32)
COLOR, POC: NEGATIVE
CREAT SERPL-MCNC: 0.7 MG/DL (ref 0.6–1.2)
EOSINOPHILS ABSOLUTE: 0.1 K/UL (ref 0–0.6)
EOSINOPHILS RELATIVE PERCENT: 1.8 %
GFR AFRICAN AMERICAN: >60
GFR NON-AFRICAN AMERICAN: >60
GLOBULIN: 2.9 G/DL
GLUCOSE BLD-MCNC: 102 MG/DL (ref 70–99)
GLUCOSE URINE, POC: NEGATIVE
HCT VFR BLD CALC: 40.6 % (ref 36–48)
HEMOGLOBIN: 13.7 G/DL (ref 12–16)
KETONES, POC: NEGATIVE
LEUKOCYTE EST, POC: ABNORMAL
LYMPHOCYTES ABSOLUTE: 1.4 K/UL (ref 1–5.1)
LYMPHOCYTES RELATIVE PERCENT: 20.2 %
MCH RBC QN AUTO: 33.1 PG (ref 26–34)
MCHC RBC AUTO-ENTMCNC: 33.8 G/DL (ref 31–36)
MCV RBC AUTO: 98 FL (ref 80–100)
MONOCYTES ABSOLUTE: 0.7 K/UL (ref 0–1.3)
MONOCYTES RELATIVE PERCENT: 9.4 %
NEUTROPHILS ABSOLUTE: 4.8 K/UL (ref 1.7–7.7)
NEUTROPHILS RELATIVE PERCENT: 67.9 %
NITRITE, POC: NEGATIVE
PDW BLD-RTO: 12.9 % (ref 12.4–15.4)
PH, POC: 8
PLATELET # BLD: 367 K/UL (ref 135–450)
PMV BLD AUTO: 8.5 FL (ref 5–10.5)
POTASSIUM SERPL-SCNC: 4.5 MMOL/L (ref 3.5–5.1)
PROTEIN, POC: NEGATIVE
RBC # BLD: 4.14 M/UL (ref 4–5.2)
SODIUM BLD-SCNC: 144 MMOL/L (ref 136–145)
SPECIFIC GRAVITY, POC: 1.01
TOTAL PROTEIN: 7.6 G/DL (ref 6.4–8.2)
TSH REFLEX: 1.24 UIU/ML (ref 0.27–4.2)
UROBILINOGEN, POC: 0.2
WBC # BLD: 7 K/UL (ref 4–11)

## 2019-02-13 PROCEDURE — 81002 URINALYSIS NONAUTO W/O SCOPE: CPT | Performed by: INTERNAL MEDICINE

## 2019-02-13 PROCEDURE — 36415 COLL VENOUS BLD VENIPUNCTURE: CPT | Performed by: INTERNAL MEDICINE

## 2019-02-13 PROCEDURE — 99213 OFFICE O/P EST LOW 20 MIN: CPT | Performed by: INTERNAL MEDICINE

## 2019-02-13 ASSESSMENT — ENCOUNTER SYMPTOMS: RESPIRATORY NEGATIVE: 1

## 2019-03-22 ENCOUNTER — OFFICE VISIT (OUTPATIENT)
Dept: PSYCHIATRY | Age: 84
End: 2019-03-22
Payer: MEDICARE

## 2019-03-22 VITALS
BODY MASS INDEX: 21.69 KG/M2 | SYSTOLIC BLOOD PRESSURE: 136 MMHG | HEIGHT: 66 IN | HEART RATE: 60 BPM | RESPIRATION RATE: 16 BRPM | DIASTOLIC BLOOD PRESSURE: 82 MMHG | WEIGHT: 135 LBS

## 2019-03-22 DIAGNOSIS — F32.A DEPRESSION, UNSPECIFIED DEPRESSION TYPE: Primary | ICD-10-CM

## 2019-03-22 DIAGNOSIS — F41.9 ANXIETY: ICD-10-CM

## 2019-03-22 PROCEDURE — 99214 OFFICE O/P EST MOD 30 MIN: CPT | Performed by: PSYCHIATRY & NEUROLOGY

## 2019-03-22 RX ORDER — CLONAZEPAM 0.5 MG/1
TABLET ORAL
Qty: 30 TABLET | Refills: 1 | Status: SHIPPED | OUTPATIENT
Start: 2019-03-22 | End: 2019-05-24 | Stop reason: SDUPTHER

## 2019-03-22 RX ORDER — FLUOXETINE 10 MG/1
10 TABLET ORAL DAILY
Qty: 90 TABLET | Refills: 1 | Status: SHIPPED | OUTPATIENT
Start: 2019-03-22 | End: 2019-05-24 | Stop reason: SDUPTHER

## 2019-03-22 RX ORDER — FLUOXETINE 10 MG/1
5 TABLET ORAL DAILY
Qty: 45 TABLET | Refills: 2 | Status: SHIPPED | OUTPATIENT
Start: 2019-03-22 | End: 2019-03-22 | Stop reason: SDUPTHER

## 2019-03-22 RX ORDER — ATORVASTATIN CALCIUM 40 MG/1
40 TABLET, FILM COATED ORAL
COMMUNITY
End: 2022-02-08

## 2019-03-28 ENCOUNTER — OFFICE VISIT (OUTPATIENT)
Dept: ORTHOPEDIC SURGERY | Age: 84
End: 2019-03-28
Payer: MEDICARE

## 2019-03-28 VITALS
BODY MASS INDEX: 21.68 KG/M2 | SYSTOLIC BLOOD PRESSURE: 148 MMHG | WEIGHT: 134.92 LBS | HEART RATE: 49 BPM | DIASTOLIC BLOOD PRESSURE: 69 MMHG | HEIGHT: 66 IN

## 2019-03-28 DIAGNOSIS — G89.29 CHRONIC PAIN OF RIGHT KNEE: Primary | ICD-10-CM

## 2019-03-28 DIAGNOSIS — M25.561 CHRONIC PAIN OF RIGHT KNEE: Primary | ICD-10-CM

## 2019-03-28 DIAGNOSIS — M17.11 PRIMARY OSTEOARTHRITIS OF RIGHT KNEE: ICD-10-CM

## 2019-03-28 PROCEDURE — 99214 OFFICE O/P EST MOD 30 MIN: CPT | Performed by: ORTHOPAEDIC SURGERY

## 2019-03-28 RX ORDER — GABAPENTIN 100 MG/1
100 CAPSULE ORAL 2 TIMES DAILY
Qty: 60 CAPSULE | Refills: 1 | Status: SHIPPED | OUTPATIENT
Start: 2019-03-28 | End: 2022-02-08

## 2019-05-24 ENCOUNTER — OFFICE VISIT (OUTPATIENT)
Dept: PSYCHIATRY | Age: 84
End: 2019-05-24
Payer: MEDICARE

## 2019-05-24 VITALS
WEIGHT: 134 LBS | HEART RATE: 87 BPM | DIASTOLIC BLOOD PRESSURE: 80 MMHG | HEIGHT: 64 IN | SYSTOLIC BLOOD PRESSURE: 120 MMHG | BODY MASS INDEX: 22.88 KG/M2

## 2019-05-24 DIAGNOSIS — F41.9 ANXIETY: ICD-10-CM

## 2019-05-24 DIAGNOSIS — F32.A DEPRESSION, UNSPECIFIED DEPRESSION TYPE: Primary | ICD-10-CM

## 2019-05-24 PROCEDURE — 99214 OFFICE O/P EST MOD 30 MIN: CPT | Performed by: PSYCHIATRY & NEUROLOGY

## 2019-05-24 RX ORDER — CLONAZEPAM 0.5 MG/1
TABLET ORAL
Qty: 30 TABLET | Refills: 1 | Status: SHIPPED | OUTPATIENT
Start: 2019-05-24 | End: 2019-08-02 | Stop reason: SDUPTHER

## 2019-05-24 RX ORDER — FLUOXETINE 10 MG/1
10 TABLET ORAL DAILY
Qty: 90 TABLET | Refills: 1 | Status: SHIPPED | OUTPATIENT
Start: 2019-05-24 | End: 2019-06-28

## 2019-05-24 NOTE — PROGRESS NOTES
mouth daily      nitroGLYCERIN (NITROSTAT) 0.4 MG SL tablet Place 0.4 mg under the tongue every 5 minutes as needed for Chest pain Dissolve 1 tab under tongue at first sign of chest pain. May repeat every 5 minutes until relief is obtained. If pain persists after taking 3 tabs in a 15-minute period, or the pain is different than is typically experienced, call 9-1-1 immediately.  Multiple Vitamin (MULTI VITAMIN DAILY PO) Take by mouth Prodovite      Ascorbic Acid (VITAMIN C) 500 MG CAPS Take 1 tablet by mouth      Potassium Gluconate 550 MG TABS Take by mouth      Vitamin D (CHOLECALCIFEROL) 1000 UNITS CAPS capsule Take 1,000 Units by mouth daily      aspirin 81 MG tablet Take 81 mg by mouth daily. No current facility-administered medications for this visit. ROS:  No tremor, gait nl    MSE:    A-casually dressed, good EC, pleasant and engageable  A-generally full  M-improving depression. S-grossly A + O  I/J-fair/fair  T-linear, goal-directed.  Speech c nl r/t/v/a.  No S/H I, no A/V H.      Controlled Substances Monitoring:      The Prescription Monitoring Report for this patient was reviewed today. Lc Tran MD)     No signs of potential drug abuse or diversion identified. Lc Tran MD)     A/P     1. OCPD traits, anxiety NOS-Prozac 5 mg qd and klonopin 0.25 bid prn. Kevan Nieto Dr. [id number]. Vince Cabrera therapist iNsh Rincon, although hasn't seen her because of the distance. I have spent >25 mins with this patient on working on coping skills and med mgt, and > 1/2 of that time was spent counseling this pt.

## 2019-06-17 ENCOUNTER — TELEPHONE (OUTPATIENT)
Dept: ORTHOPEDIC SURGERY | Age: 84
End: 2019-06-17

## 2019-06-17 DIAGNOSIS — M17.11 PRIMARY OSTEOARTHRITIS OF RIGHT KNEE: ICD-10-CM

## 2019-06-17 DIAGNOSIS — G89.29 CHRONIC PAIN OF RIGHT KNEE: Primary | ICD-10-CM

## 2019-06-17 DIAGNOSIS — M25.561 CHRONIC PAIN OF RIGHT KNEE: Primary | ICD-10-CM

## 2019-06-28 ENCOUNTER — OFFICE VISIT (OUTPATIENT)
Dept: PSYCHIATRY | Age: 84
End: 2019-06-28
Payer: MEDICARE

## 2019-06-28 VITALS
BODY MASS INDEX: 22.71 KG/M2 | HEIGHT: 64 IN | WEIGHT: 133 LBS | SYSTOLIC BLOOD PRESSURE: 138 MMHG | HEART RATE: 74 BPM | DIASTOLIC BLOOD PRESSURE: 90 MMHG

## 2019-06-28 DIAGNOSIS — F41.9 ANXIETY: ICD-10-CM

## 2019-06-28 DIAGNOSIS — F32.A DEPRESSION, UNSPECIFIED DEPRESSION TYPE: Primary | ICD-10-CM

## 2019-06-28 PROCEDURE — 99214 OFFICE O/P EST MOD 30 MIN: CPT | Performed by: PSYCHIATRY & NEUROLOGY

## 2019-06-28 RX ORDER — ESCITALOPRAM OXALATE 5 MG/1
TABLET ORAL
Qty: 90 TABLET | Refills: 1 | Status: SHIPPED | OUTPATIENT
Start: 2019-06-28 | End: 2019-08-02

## 2019-06-28 NOTE — PROGRESS NOTES
Psych Follow Up Progress Note    6/28/19  Nicholas Escmailla  [id number]    I have reviewed recent documentation:  Nicholas Escamilla is a 80 y.o. female  This patient  has a past medical history of Aortic insufficiency, Breast cancer ([facility] [address] CAD (coronary artery disease), Heart attack ([facility] [address] HTN (hypertension), Hx of blood clots, and SVT (supraventricular tachycardia) ([facility] [address] Chief Complaint   Patient presents with    Depression    Anxiety       Subjective/Interval Hx:  Seeing new PMD, Dr. Bishop Barton. Anxious about driving, so she went to [place] and got evaluated and got some more confidence, so this is good. Still c dizziness. Has been cutting down on prozac, now down to 2.5 mg.  Still on klonopin. Doing lots of grieving. Things c granddaughter Beckey Rings and BF c no job are getting worse. She's constantly needing money. Interested in maybe trying a different ssri. Grieves youth and what age has done to her body. Other daughter Shaneka Griffith with [facility] was talking so much scripture etc the other day, pt just could barely listen. Son Dairl Hashimoto is doing well down in [place]. Location:  Mind  Severity:  Mild-mod  Context:  As above. Modifiers:  meds somewhat helpful. Quality:  Anxiety, grief    Objective:  Vitals:    06/28/19 1326   BP: (!) 138/90   Pulse: 74   Weight: 133 lb (60.3 kg)   Height: 5' 4\" (1.626 m)       Body mass index is 22.83 kg/m². No results found for this or any previous visit (from the past 168 hour(s)). Current Outpatient Medications   Medication Sig Dispense Refill    clonazePAM (KLONOPIN) 0.5 MG tablet Take 1/4 tablet twice daily. 30 tablet 1    FLUoxetine HCl, PMDD, 10 MG TABS Take 10 mg by mouth daily 90 tablet 1    gabapentin (NEURONTIN) 100 MG capsule Take 1 capsule by mouth 2 times daily for 30 days.  Intended supply: 30 days 60 capsule 1    atorvastatin (LIPITOR) 40 MG tablet Take 40 mg by mouth      TURMERIC PO Take by mouth daily      Misc Natural Products (TART CHERRY ADVANCED PO) Take by mouth daily      lisinopril (PRINIVIL;ZESTRIL) 10 MG tablet Take 1 tablet by mouth daily 30 tablet 3    amLODIPine (NORVASC) 10 MG tablet Take 10 mg by mouth daily      Omega-3 Fatty Acids (FISH OIL PO) Take 1 tablet by mouth daily      nitroGLYCERIN (NITROSTAT) 0.4 MG SL tablet Place 0.4 mg under the tongue every 5 minutes as needed for Chest pain Dissolve 1 tab under tongue at first sign of chest pain. May repeat every 5 minutes until relief is obtained. If pain persists after taking 3 tabs in a 15-minute period, or the pain is different than is typically experienced, call 9-1-1 immediately.  Multiple Vitamin (MULTI VITAMIN DAILY PO) Take by mouth Prodovite      Ascorbic Acid (VITAMIN C) 500 MG CAPS Take 1 tablet by mouth      Potassium Gluconate 550 MG TABS Take by mouth      Vitamin D (CHOLECALCIFEROL) 1000 UNITS CAPS capsule Take 1,000 Units by mouth daily      aspirin 81 MG tablet Take 81 mg by mouth daily. No current facility-administered medications for this visit. ROS:  No tremor, gait nl    MSE:     A-casually dressed, good EC, pleasant and engageable  A-generally full  M-a bit more depressed, anxious. S-grossly A + O  I/J-fair/fair  T-linear, goal-directed.  Speech c nl r/t/v/a.  No S/H I, no A/V H.      Controlled Substances Monitoring:      The Prescription Monitoring Report for this patient was reviewed today. Lc Tran MD)     No signs of potential drug abuse or diversion identified. Lc Tran MD)     A/P     1. OCPD traits, anxiety NOS-We'll get pt off prozac, try lexapro 2.5 qd c plan to titrate. Cont klonopin 0.25 bid prn. Kevan Nieto Dr. [id number]. Vince [facility] therapist Nish Rincon, although hasn't seen her because of the distance. I have spent >25 mins with this patient on working on coping skills and med mgt, and > 1/2 of that time was spent counseling this pt.

## 2019-07-10 ENCOUNTER — TELEPHONE (OUTPATIENT)
Dept: FAMILY MEDICINE CLINIC | Age: 84
End: 2019-07-10

## 2019-08-02 ENCOUNTER — OFFICE VISIT (OUTPATIENT)
Dept: PSYCHIATRY | Age: 84
End: 2019-08-02
Payer: MEDICARE

## 2019-08-02 VITALS
HEART RATE: 85 BPM | BODY MASS INDEX: 22.36 KG/M2 | SYSTOLIC BLOOD PRESSURE: 130 MMHG | DIASTOLIC BLOOD PRESSURE: 80 MMHG | WEIGHT: 131 LBS | HEIGHT: 64 IN

## 2019-08-02 DIAGNOSIS — F32.A DEPRESSION, UNSPECIFIED DEPRESSION TYPE: Primary | ICD-10-CM

## 2019-08-02 DIAGNOSIS — F41.9 ANXIETY: ICD-10-CM

## 2019-08-02 PROCEDURE — 99214 OFFICE O/P EST MOD 30 MIN: CPT | Performed by: PSYCHIATRY & NEUROLOGY

## 2019-08-02 RX ORDER — CLONAZEPAM 0.5 MG/1
TABLET ORAL
Qty: 30 TABLET | Refills: 1 | Status: SHIPPED | OUTPATIENT
Start: 2019-08-02 | End: 2019-11-15 | Stop reason: SDUPTHER

## 2019-09-04 ENCOUNTER — HOSPITAL ENCOUNTER (OUTPATIENT)
Dept: CT IMAGING | Age: 84
Discharge: HOME | End: 2019-09-04
Payer: MEDICARE

## 2019-09-04 DIAGNOSIS — M43.16 SPONDYLOLISTHESIS OF LUMBAR REGION: ICD-10-CM

## 2019-09-04 DIAGNOSIS — M54.5 BILATERAL LOW BACK PAIN, UNSPECIFIED CHRONICITY, WITH SCIATICA PRESENCE UNSPECIFIED: ICD-10-CM

## 2019-09-04 PROCEDURE — 72131 CT LUMBAR SPINE W/O DYE: CPT

## 2019-09-06 ENCOUNTER — OFFICE VISIT (OUTPATIENT)
Dept: PSYCHIATRY | Age: 84
End: 2019-09-06
Payer: MEDICARE

## 2019-09-06 VITALS
WEIGHT: 131 LBS | SYSTOLIC BLOOD PRESSURE: 130 MMHG | BODY MASS INDEX: 23.21 KG/M2 | DIASTOLIC BLOOD PRESSURE: 60 MMHG | HEART RATE: 73 BPM | HEIGHT: 63 IN

## 2019-09-06 DIAGNOSIS — F41.9 ANXIETY: ICD-10-CM

## 2019-09-06 DIAGNOSIS — F32.A DEPRESSION, UNSPECIFIED DEPRESSION TYPE: Primary | ICD-10-CM

## 2019-09-06 PROCEDURE — 99214 OFFICE O/P EST MOD 30 MIN: CPT | Performed by: PSYCHIATRY & NEUROLOGY

## 2019-09-06 RX ORDER — VENLAFAXINE HYDROCHLORIDE 37.5 MG/1
37.5 CAPSULE, EXTENDED RELEASE ORAL DAILY
Qty: 30 CAPSULE | Refills: 3 | Status: SHIPPED | OUTPATIENT
Start: 2019-09-06 | End: 2019-11-15 | Stop reason: SDUPTHER

## 2019-10-17 ENCOUNTER — HOSPITAL ENCOUNTER (OUTPATIENT)
Dept: CT IMAGING | Age: 84
Discharge: HOME | End: 2019-10-17
Payer: MEDICARE

## 2019-10-17 DIAGNOSIS — R42 DIZZINESS AND GIDDINESS: ICD-10-CM

## 2019-10-17 PROCEDURE — 70450 CT HEAD/BRAIN W/O DYE: CPT

## 2019-11-15 ENCOUNTER — OFFICE VISIT (OUTPATIENT)
Dept: PSYCHIATRY | Age: 84
End: 2019-11-15
Payer: MEDICARE

## 2019-11-15 VITALS
WEIGHT: 135 LBS | DIASTOLIC BLOOD PRESSURE: 68 MMHG | SYSTOLIC BLOOD PRESSURE: 128 MMHG | HEART RATE: 92 BPM | BODY MASS INDEX: 24.3 KG/M2

## 2019-11-15 DIAGNOSIS — F41.9 ANXIETY: ICD-10-CM

## 2019-11-15 DIAGNOSIS — F32.A DEPRESSION, UNSPECIFIED DEPRESSION TYPE: Primary | ICD-10-CM

## 2019-11-15 PROCEDURE — 99214 OFFICE O/P EST MOD 30 MIN: CPT | Performed by: PSYCHIATRY & NEUROLOGY

## 2019-11-15 RX ORDER — CLONAZEPAM 0.5 MG/1
TABLET ORAL
Qty: 45 TABLET | Refills: 1 | Status: SHIPPED | OUTPATIENT
Start: 2019-11-15 | End: 2020-02-13 | Stop reason: SDUPTHER

## 2019-11-15 RX ORDER — VENLAFAXINE HYDROCHLORIDE 75 MG/1
75 CAPSULE, EXTENDED RELEASE ORAL DAILY
Qty: 90 CAPSULE | Refills: 1 | Status: SHIPPED | OUTPATIENT
Start: 2019-11-15 | End: 2020-02-13

## 2019-12-11 ENCOUNTER — OFFICE VISIT (OUTPATIENT)
Dept: PSYCHIATRY | Age: 84
End: 2019-12-11
Payer: MEDICARE

## 2019-12-11 VITALS
DIASTOLIC BLOOD PRESSURE: 80 MMHG | SYSTOLIC BLOOD PRESSURE: 120 MMHG | WEIGHT: 134 LBS | HEART RATE: 87 BPM | BODY MASS INDEX: 23.74 KG/M2 | HEIGHT: 63 IN

## 2019-12-11 DIAGNOSIS — F32.A DEPRESSION, UNSPECIFIED DEPRESSION TYPE: Primary | ICD-10-CM

## 2019-12-11 DIAGNOSIS — F41.9 ANXIETY: ICD-10-CM

## 2019-12-11 PROCEDURE — 99214 OFFICE O/P EST MOD 30 MIN: CPT | Performed by: PSYCHIATRY & NEUROLOGY

## 2019-12-22 ENCOUNTER — APPOINTMENT (OUTPATIENT)
Dept: GENERAL RADIOLOGY | Age: 84
End: 2019-12-22
Payer: MEDICARE

## 2019-12-22 ENCOUNTER — HOSPITAL ENCOUNTER (EMERGENCY)
Age: 84
Discharge: HOME | End: 2019-12-22
Payer: MEDICARE

## 2019-12-22 VITALS
OXYGEN SATURATION: 97 % | TEMPERATURE: 98.8 F | WEIGHT: 134.04 LBS | SYSTOLIC BLOOD PRESSURE: 130 MMHG | HEART RATE: 83 BPM | RESPIRATION RATE: 13 BRPM | DIASTOLIC BLOOD PRESSURE: 69 MMHG | HEIGHT: 64 IN | BODY MASS INDEX: 22.88 KG/M2

## 2019-12-22 DIAGNOSIS — J18.9 PNEUMONIA DUE TO ORGANISM: Primary | ICD-10-CM

## 2019-12-22 LAB
A/G RATIO: 1.2 (ref 1.1–2.2)
ALBUMIN SERPL-MCNC: 4.1 G/DL (ref 3.4–5)
ALP BLD-CCNC: 72 U/L (ref 40–129)
ALT SERPL-CCNC: 11 U/L (ref 10–40)
ANION GAP SERPL CALCULATED.3IONS-SCNC: 16 MMOL/L (ref 3–16)
AST SERPL-CCNC: 19 U/L (ref 15–37)
BASOPHILS ABSOLUTE: 0 K/UL (ref 0–0.2)
BASOPHILS RELATIVE PERCENT: 0.4 %
BILIRUB SERPL-MCNC: 0.6 MG/DL (ref 0–1)
BILIRUBIN URINE: NEGATIVE
BLOOD, URINE: NEGATIVE
BUN BLDV-MCNC: 13 MG/DL (ref 7–20)
CALCIUM SERPL-MCNC: 9 MG/DL (ref 8.3–10.6)
CHLORIDE BLD-SCNC: 97 MMOL/L (ref 99–110)
CLARITY: CLEAR
CO2: 21 MMOL/L (ref 21–32)
COLOR: YELLOW
CREAT SERPL-MCNC: 0.7 MG/DL (ref 0.6–1.2)
EOSINOPHILS ABSOLUTE: 0.1 K/UL (ref 0–0.6)
EOSINOPHILS RELATIVE PERCENT: 1.6 %
EPITHELIAL CELLS, UA: 1 /HPF (ref 0–5)
GFR AFRICAN AMERICAN: >60
GFR NON-AFRICAN AMERICAN: >60
GLOBULIN: 3.5 G/DL
GLUCOSE BLD-MCNC: 113 MG/DL (ref 70–99)
GLUCOSE URINE: NEGATIVE MG/DL
HCT VFR BLD CALC: 39.9 % (ref 36–48)
HEMOGLOBIN: 13.4 G/DL (ref 12–16)
HYALINE CASTS: 0 /LPF (ref 0–8)
KETONES, URINE: ABNORMAL MG/DL
LEUKOCYTE ESTERASE, URINE: ABNORMAL
LYMPHOCYTES ABSOLUTE: 0.7 K/UL (ref 1–5.1)
LYMPHOCYTES RELATIVE PERCENT: 8.6 %
MCH RBC QN AUTO: 32.6 PG (ref 26–34)
MCHC RBC AUTO-ENTMCNC: 33.6 G/DL (ref 31–36)
MCV RBC AUTO: 97.1 FL (ref 80–100)
MICROSCOPIC EXAMINATION: YES
MONOCYTES ABSOLUTE: 1 K/UL (ref 0–1.3)
MONOCYTES RELATIVE PERCENT: 12.4 %
NEUTROPHILS ABSOLUTE: 6.1 K/UL (ref 1.7–7.7)
NEUTROPHILS RELATIVE PERCENT: 77 %
NITRITE, URINE: NEGATIVE
PDW BLD-RTO: 13.3 % (ref 12.4–15.4)
PH UA: 6.5 (ref 5–8)
PLATELET # BLD: 275 K/UL (ref 135–450)
PMV BLD AUTO: 7.7 FL (ref 5–10.5)
POTASSIUM REFLEX MAGNESIUM: 3.8 MMOL/L (ref 3.5–5.1)
PROTEIN UA: NEGATIVE MG/DL
RBC # BLD: 4.11 M/UL (ref 4–5.2)
RBC UA: 1 /HPF (ref 0–4)
SODIUM BLD-SCNC: 134 MMOL/L (ref 136–145)
SPECIFIC GRAVITY UA: 1.01 (ref 1–1.03)
TOTAL PROTEIN: 7.6 G/DL (ref 6.4–8.2)
URINE REFLEX TO CULTURE: YES
URINE TYPE: ABNORMAL
UROBILINOGEN, URINE: 1 E.U./DL
WBC # BLD: 8 K/UL (ref 4–11)
WBC UA: 1 /HPF (ref 0–5)

## 2019-12-22 PROCEDURE — 87086 URINE CULTURE/COLONY COUNT: CPT

## 2019-12-22 PROCEDURE — 99285 EMERGENCY DEPT VISIT HI MDM: CPT

## 2019-12-22 PROCEDURE — 94640 AIRWAY INHALATION TREATMENT: CPT

## 2019-12-22 PROCEDURE — 81001 URINALYSIS AUTO W/SCOPE: CPT

## 2019-12-22 PROCEDURE — 85025 COMPLETE CBC W/AUTO DIFF WBC: CPT

## 2019-12-22 PROCEDURE — 71046 X-RAY EXAM CHEST 2 VIEWS: CPT

## 2019-12-22 PROCEDURE — 6370000000 HC RX 637 (ALT 250 FOR IP): Performed by: PHYSICIAN ASSISTANT

## 2019-12-22 PROCEDURE — 2580000003 HC RX 258: Performed by: PHYSICIAN ASSISTANT

## 2019-12-22 PROCEDURE — 93005 ELECTROCARDIOGRAM TRACING: CPT | Performed by: PHYSICIAN ASSISTANT

## 2019-12-22 PROCEDURE — 80053 COMPREHEN METABOLIC PANEL: CPT

## 2019-12-22 RX ORDER — CEFUROXIME AXETIL 250 MG/1
250 TABLET ORAL 2 TIMES DAILY
Qty: 14 TABLET | Refills: 0 | Status: SHIPPED | OUTPATIENT
Start: 2019-12-22 | End: 2019-12-29

## 2019-12-22 RX ORDER — IPRATROPIUM BROMIDE AND ALBUTEROL SULFATE 2.5; .5 MG/3ML; MG/3ML
1 SOLUTION RESPIRATORY (INHALATION) ONCE
Status: COMPLETED | OUTPATIENT
Start: 2019-12-22 | End: 2019-12-22

## 2019-12-22 RX ORDER — 0.9 % SODIUM CHLORIDE 0.9 %
1000 INTRAVENOUS SOLUTION INTRAVENOUS ONCE
Status: COMPLETED | OUTPATIENT
Start: 2019-12-22 | End: 2019-12-22

## 2019-12-22 RX ORDER — ALBUTEROL SULFATE 90 UG/1
2 AEROSOL, METERED RESPIRATORY (INHALATION) 4 TIMES DAILY
Qty: 1 INHALER | Refills: 0 | Status: SHIPPED | OUTPATIENT
Start: 2019-12-22 | End: 2022-02-08

## 2019-12-22 RX ADMIN — IPRATROPIUM BROMIDE AND ALBUTEROL SULFATE 1 AMPULE: .5; 3 SOLUTION RESPIRATORY (INHALATION) at 11:54

## 2019-12-22 RX ADMIN — SODIUM CHLORIDE 1000 ML: 9 INJECTION, SOLUTION INTRAVENOUS at 12:24

## 2019-12-22 ASSESSMENT — PAIN DESCRIPTION - LOCATION: LOCATION: CHEST

## 2019-12-22 ASSESSMENT — PAIN SCALES - GENERAL: PAINLEVEL_OUTOF10: 5

## 2019-12-22 ASSESSMENT — PAIN DESCRIPTION - ONSET: ONSET: ON-GOING

## 2019-12-22 ASSESSMENT — PAIN DESCRIPTION - PAIN TYPE: TYPE: ACUTE PAIN

## 2019-12-22 ASSESSMENT — PAIN - FUNCTIONAL ASSESSMENT: PAIN_FUNCTIONAL_ASSESSMENT: ACTIVITIES ARE NOT PREVENTED

## 2019-12-22 ASSESSMENT — ENCOUNTER SYMPTOMS
VOMITING: 0
SORE THROAT: 0
SHORTNESS OF BREATH: 1
ABDOMINAL PAIN: 0
NAUSEA: 0
BACK PAIN: 0
COUGH: 1
RHINORRHEA: 1

## 2019-12-22 ASSESSMENT — PAIN DESCRIPTION - ORIENTATION: ORIENTATION: MID

## 2019-12-22 ASSESSMENT — PAIN DESCRIPTION - FREQUENCY: FREQUENCY: CONTINUOUS

## 2019-12-22 ASSESSMENT — PAIN DESCRIPTION - PROGRESSION: CLINICAL_PROGRESSION: NOT CHANGED

## 2019-12-22 ASSESSMENT — PAIN DESCRIPTION - DESCRIPTORS: DESCRIPTORS: DISCOMFORT

## 2019-12-23 LAB
EKG ATRIAL RATE: 85 BPM
EKG DIAGNOSIS: NORMAL
EKG P AXIS: 0 DEGREES
EKG P-R INTERVAL: 124 MS
EKG Q-T INTERVAL: 406 MS
EKG QRS DURATION: 156 MS
EKG QTC CALCULATION (BAZETT): 483 MS
EKG R AXIS: -63 DEGREES
EKG T AXIS: 100 DEGREES
EKG VENTRICULAR RATE: 85 BPM
URINE CULTURE, ROUTINE: NORMAL

## 2019-12-23 PROCEDURE — 93010 ELECTROCARDIOGRAM REPORT: CPT | Performed by: INTERNAL MEDICINE

## 2020-01-07 ENCOUNTER — TELEPHONE (OUTPATIENT)
Dept: FAMILY MEDICINE CLINIC | Age: 85
End: 2020-01-07

## 2020-01-15 ENCOUNTER — OFFICE VISIT (OUTPATIENT)
Dept: PSYCHIATRY | Age: 85
End: 2020-01-15
Payer: MEDICARE

## 2020-01-15 VITALS
WEIGHT: 132 LBS | BODY MASS INDEX: 22.66 KG/M2 | DIASTOLIC BLOOD PRESSURE: 84 MMHG | HEART RATE: 98 BPM | SYSTOLIC BLOOD PRESSURE: 153 MMHG

## 2020-01-15 PROCEDURE — 99214 OFFICE O/P EST MOD 30 MIN: CPT | Performed by: PSYCHIATRY & NEUROLOGY

## 2020-01-15 NOTE — PROGRESS NOTES
Psych Follow Up Progress Note    1/15/20  Blade Rivas  [id number]    I have reviewed recent documentation:  Blade Rivas is a 80 y.o. female  This patient  has a past medical history of Aortic insufficiency, Breast cancer ([facility] [address] CAD (coronary artery disease), Heart attack ([facility] [address] HTN (hypertension), Hx of blood clots, and SVT (supraventricular tachycardia) ([facility] [address] Chief Complaint   Patient presents with    Depression       Subjective/Interval Hx:  Dizziness is still miserable. Went to [place] to see great grandbaby. Still dizzy at times, and this limits her. Stopped taking effexor after she got pna. Location:  Mind  Severity:  Mild-mod  Context:  As above. Modifiers:  Dizziness  Quality:  Anxiety, depression. Objective:  Vitals:    01/15/20 1633   BP: (!) 153/84   Pulse: 98   Weight: 132 lb (59.9 kg)        Body mass index is 22.66 kg/m². No results found for this or any previous visit (from the past 168 hour(s)). Current Outpatient Medications   Medication Sig Dispense Refill    albuterol sulfate HFA (VENTOLIN HFA) 108 (90 Base) MCG/ACT inhaler Inhale 2 puffs into the lungs 4 times daily for 5 days 1 Inhaler 0    clonazePAM (KLONOPIN) 0.5 MG tablet Take 1/4 tablet twice daily. 45 tablet 1    venlafaxine (EFFEXOR XR) 75 MG extended release capsule Take 1 capsule by mouth daily 90 capsule 1    gabapentin (NEURONTIN) 100 MG capsule Take 1 capsule by mouth 2 times daily for 30 days.  Intended supply: 30 days 60 capsule 1    atorvastatin (LIPITOR) 40 MG tablet Take 40 mg by mouth      TURMERIC PO Take by mouth daily      Misc Natural Products (TART CHERRY ADVANCED PO) Take by mouth daily      lisinopril (PRINIVIL;ZESTRIL) 10 MG tablet Take 1 tablet by mouth daily 30 tablet 3    amLODIPine (NORVASC) 10 MG tablet Take 10 mg by mouth daily      Omega-3 Fatty Acids (FISH OIL PO) Take 1 tablet by mouth daily      nitroGLYCERIN (NITROSTAT) 0.4 MG SL tablet Place 0.4 mg under the tongue every 5 minutes as needed for Chest pain Dissolve 1 tab under tongue at first sign of chest pain. May repeat every 5 minutes until relief is obtained. If pain persists after taking 3 tabs in a 15-minute period, or the pain is different than is typically experienced, call 9-1-1 immediately.  Multiple Vitamin (MULTI VITAMIN DAILY PO) Take by mouth Prodovite      Ascorbic Acid (VITAMIN C) 500 MG CAPS Take 1 tablet by mouth      Potassium Gluconate 550 MG TABS Take by mouth      Vitamin D (CHOLECALCIFEROL) 1000 UNITS CAPS capsule Take 1,000 Units by mouth daily      aspirin 81 MG tablet Take 81 mg by mouth daily. No current facility-administered medications for this visit. ROS:  No tremor, gait nl    MSE:    A-casually dressed, good EC, pleasant and engageable  A-more full  M-a little more anxious and depressed. S-grossly A + O  I/J-fair/fair  T-linear, goal-directed.  Speech c nl r/t/v/a.  No S/H I, no A/V H.      Controlled Substance Monitoring:    Acute and Chronic Pain Monitoring:   RX Monitoring 1/15/2020   Attestation -   Periodic Controlled Substance Monitoring No signs of potential drug abuse or diversion identified. A/P     1. OCPD traits, anxiety NOS-Stopped taking effexor, we'll take it off her list.  Cont klonopin 0.25 bid prn. Minor Poor  [id number]. Cheo Paige to Daina Cruz. Also recommended hypnosis, acupuncture, exercise, yoga, neurofeedback.     I have spent >25 mins with this patient on working on coping skills and med mgt, and > 1/2 of that time was spent counseling this pt.

## 2020-02-13 ENCOUNTER — OFFICE VISIT (OUTPATIENT)
Dept: PSYCHIATRY | Age: 85
End: 2020-02-13
Payer: MEDICARE

## 2020-02-13 VITALS
WEIGHT: 136 LBS | DIASTOLIC BLOOD PRESSURE: 80 MMHG | SYSTOLIC BLOOD PRESSURE: 130 MMHG | HEART RATE: 88 BPM | BODY MASS INDEX: 23.34 KG/M2

## 2020-02-13 PROCEDURE — 99214 OFFICE O/P EST MOD 30 MIN: CPT | Performed by: PSYCHIATRY & NEUROLOGY

## 2020-02-13 RX ORDER — CLONAZEPAM 0.5 MG/1
TABLET ORAL
Qty: 45 TABLET | Refills: 1 | Status: SHIPPED | OUTPATIENT
Start: 2020-02-13 | End: 2020-04-24 | Stop reason: SDUPTHER

## 2020-03-20 ENCOUNTER — OFFICE VISIT (OUTPATIENT)
Dept: PSYCHIATRY | Age: 85
End: 2020-03-20

## 2020-03-20 ENCOUNTER — TELEPHONE (OUTPATIENT)
Dept: PSYCHIATRY | Age: 85
End: 2020-03-20

## 2020-03-20 NOTE — TELEPHONE ENCOUNTER
Psych Phone Note    03/20/20    Blayne Agarwal  [id number]    I have reviewed recent documentation:  Blayne Agarwal is a 80 y.o. female  This patient  has a past medical history of Aortic insufficiency, Breast cancer ([facility] [address] CAD (coronary artery disease), Heart attack ([facility] [address] HTN (hypertension), Hx of blood clots, and SVT (supraventricular tachycardia) ([facility] [address] No chief complaint on file. Subjective/Interval Hx:  Doing ok. Wonders why she's so calm when everyone else is freaking out about corona. But hanging in there. Making good choices about isolation. Got through Helio's death anniversary ok. No results found for this or any previous visit (from the past 168 hour(s)). Current Outpatient Medications   Medication Sig Dispense Refill    clonazePAM (KLONOPIN) 0.5 MG tablet Take 1/4 tablet twice daily. 45 tablet 1    albuterol sulfate HFA (VENTOLIN HFA) 108 (90 Base) MCG/ACT inhaler Inhale 2 puffs into the lungs 4 times daily for 5 days 1 Inhaler 0    gabapentin (NEURONTIN) 100 MG capsule Take 1 capsule by mouth 2 times daily for 30 days. Intended supply: 30 days 60 capsule 1    atorvastatin (LIPITOR) 40 MG tablet Take 40 mg by mouth      TURMERIC PO Take by mouth daily      Misc Natural Products (TART CHERRY ADVANCED PO) Take by mouth daily      lisinopril (PRINIVIL;ZESTRIL) 10 MG tablet Take 1 tablet by mouth daily 30 tablet 3    amLODIPine (NORVASC) 10 MG tablet Take 10 mg by mouth daily      Omega-3 Fatty Acids (FISH OIL PO) Take 1 tablet by mouth daily      nitroGLYCERIN (NITROSTAT) 0.4 MG SL tablet Place 0.4 mg under the tongue every 5 minutes as needed for Chest pain Dissolve 1 tab under tongue at first sign of chest pain. May repeat every 5 minutes until relief is obtained. If pain persists after taking 3 tabs in a 15-minute period, or the pain is different than is typically experienced, call 9-1-1 immediately.       Multiple Vitamin (MULTI VITAMIN DAILY PO) Take by mouth Prodovite      Ascorbic Acid (VITAMIN C) 500 MG CAPS Take 1 tablet by mouth      Potassium Gluconate 550 MG TABS Take by mouth      Vitamin D (CHOLECALCIFEROL) 1000 UNITS CAPS capsule Take 1,000 Units by mouth daily      aspirin 81 MG tablet Take 81 mg by mouth daily. No current facility-administered medications for this visit. ROS:  No tremor, gait nl         Controlled Substance Monitoring:    Acute and Chronic Pain Monitoring:   RX Monitoring 3/20/2020   Attestation -   Periodic Controlled Substance Monitoring No signs of potential drug abuse or diversion identified. A/P     1. OCPD traits, anxiety NOS-Cont klonopin 0.25 bid prn. Izora Aase Dr. [phone]. Rosas Chacon to Yvon Soto recommended hypnosis, acupuncture, exercise, yoga, neurofeedback. I have spent >25 mins with this patient on working on coping skills and med mgt, and > 1/2 of that time was spent counseling this pt.

## 2020-04-10 ENCOUNTER — TELEPHONE (OUTPATIENT)
Dept: INTERNAL MEDICINE CLINIC | Age: 85
End: 2020-04-10

## 2020-04-24 ENCOUNTER — OFFICE VISIT (OUTPATIENT)
Dept: PSYCHIATRY | Age: 85
End: 2020-04-24
Payer: MEDICARE

## 2020-04-24 PROCEDURE — 99443 PR PHYS/QHP TELEPHONE EVALUATION 21-30 MIN: CPT | Performed by: PSYCHIATRY & NEUROLOGY

## 2020-04-24 RX ORDER — CLONAZEPAM 0.5 MG/1
TABLET ORAL
Qty: 45 TABLET | Refills: 2 | Status: SHIPPED | OUTPATIENT
Start: 2020-04-24 | End: 2020-08-04 | Stop reason: SDUPTHER

## 2020-04-24 NOTE — PROGRESS NOTES
Psych Follow Up Progress Note    4/24/20  Domitila Diaz  [id number]    I have reviewed recent documentation:  Domitila Diaz is a 80 y.o. female  This patient  has a past medical history of Aortic insufficiency, Breast cancer ([facility] [address] CAD (coronary artery disease), Heart attack ([facility] [address] HTN (hypertension), Hx of blood clots, and SVT (supraventricular tachycardia) ([facility] [address] No chief complaint on file. I called this patient today because of safety concerns regarding coronavirus. Subjective/Interval Hx:  Holding up ok. Started back on effexor. Not being able to do the usual stuff like going to the store is miserable. Feels like anxiety makes her feel weak in her body. Started exercising, and that's helped a bit. But mostly staying in the house. Knee surgery was canceled. This week would have been pt's anniversary c [place]. Effexor not causing any real side effects, we're a couple weeks in.  Klonopin really helps sleep, \"it's the best,\" though yes, we worry about memory effects. Location:  Mind  Severity:  mildish at this point. Context:  As above. Modifiers:  meds are somewhat helpful. Quality:  Anxiety, depression. Objective:      No results found for this or any previous visit (from the past 168 hour(s)). Current Outpatient Medications   Medication Sig Dispense Refill    clonazePAM (KLONOPIN) 0.5 MG tablet Take 1/4 tablet twice daily. 45 tablet 1    albuterol sulfate HFA (VENTOLIN HFA) 108 (90 Base) MCG/ACT inhaler Inhale 2 puffs into the lungs 4 times daily for 5 days 1 Inhaler 0    gabapentin (NEURONTIN) 100 MG capsule Take 1 capsule by mouth 2 times daily for 30 days.  Intended supply: 30 days 60 capsule 1    atorvastatin (LIPITOR) 40 MG tablet Take 40 mg by mouth      TURMERIC PO Take by mouth daily      Misc Natural Products (TART CHERRY ADVANCED PO) Take by mouth daily      lisinopril (PRINIVIL;ZESTRIL) 10 MG tablet Take 1 tablet by mouth daily 30 tablet 3    amLODIPine (NORVASC) 10 MG tablet Take 10 mg by mouth daily      Omega-3 Fatty Acids (FISH OIL PO) Take 1 tablet by mouth daily      nitroGLYCERIN (NITROSTAT) 0.4 MG SL tablet Place 0.4 mg under the tongue every 5 minutes as needed for Chest pain Dissolve 1 tab under tongue at first sign of chest pain. May repeat every 5 minutes until relief is obtained. If pain persists after taking 3 tabs in a 15-minute period, or the pain is different than is typically experienced, call 9-1-1 immediately.  Multiple Vitamin (MULTI VITAMIN DAILY PO) Take by mouth Prodovite      Ascorbic Acid (VITAMIN C) 500 MG CAPS Take 1 tablet by mouth      Potassium Gluconate 550 MG TABS Take by mouth      Vitamin D (CHOLECALCIFEROL) 1000 UNITS CAPS capsule Take 1,000 Units by mouth daily      aspirin 81 MG tablet Take 81 mg by mouth daily. No current facility-administered medications for this visit. Controlled Substance Monitoring:    Acute and Chronic Pain Monitoring:   RX Monitoring 4/24/2020   Attestation -   Periodic Controlled Substance Monitoring No signs of potential drug abuse or diversion identified. A/P     1. OCPD traits, anxiety NOS-Cont klonopin 0.25 bid prn. Cont effexor 75 qd. Eric Sullivan Dr. [phone].         I have spent >25 mins with this patient on working on coping skills and med mgt, and > 1/2 of that time was spent counseling this pt.

## 2020-05-15 ENCOUNTER — OFFICE VISIT (OUTPATIENT)
Dept: PSYCHIATRY | Age: 85
End: 2020-05-15
Payer: MEDICARE

## 2020-05-15 PROCEDURE — 99443 PR PHYS/QHP TELEPHONE EVALUATION 21-30 MIN: CPT | Performed by: PSYCHIATRY & NEUROLOGY

## 2020-05-15 NOTE — PROGRESS NOTES
0.4 mg under the tongue every 5 minutes as needed for Chest pain Dissolve 1 tab under tongue at first sign of chest pain. May repeat every 5 minutes until relief is obtained. If pain persists after taking 3 tabs in a 15-minute period, or the pain is different than is typically experienced, call 9-1-1 immediately.  Multiple Vitamin (MULTI VITAMIN DAILY PO) Take by mouth Prodovite      Ascorbic Acid (VITAMIN C) 500 MG CAPS Take 1 tablet by mouth      Potassium Gluconate 550 MG TABS Take by mouth      Vitamin D (CHOLECALCIFEROL) 1000 UNITS CAPS capsule Take 1,000 Units by mouth daily      aspirin 81 MG tablet Take 81 mg by mouth daily. No current facility-administered medications for this visit. A/P     1. OCPD traits, anxiety NOS-Cont klonopin 0.25 bid prn. Cont effexor 75 qd. Shashi Powell Dr. [id number].       I have spent >21 mins with this patient on working on coping skills and med mgt, and > 1/2 of that time was spent counseling this pt.

## 2020-06-05 ENCOUNTER — OFFICE VISIT (OUTPATIENT)
Dept: PSYCHIATRY | Age: 85
End: 2020-06-05
Payer: MEDICARE

## 2020-06-05 PROCEDURE — 99443 PR PHYS/QHP TELEPHONE EVALUATION 21-30 MIN: CPT | Performed by: PSYCHIATRY & NEUROLOGY

## 2020-06-05 RX ORDER — VENLAFAXINE HYDROCHLORIDE 37.5 MG/1
37.5 CAPSULE, EXTENDED RELEASE ORAL DAILY
Qty: 90 CAPSULE | Refills: 1 | Status: SHIPPED
Start: 2020-06-05 | End: 2020-08-27 | Stop reason: SINTOL

## 2020-06-05 NOTE — PROGRESS NOTES
Chest pain Dissolve 1 tab under tongue at first sign of chest pain. May repeat every 5 minutes until relief is obtained. If pain persists after taking 3 tabs in a 15-minute period, or the pain is different than is typically experienced, call 9-1-1 immediately.  Multiple Vitamin (MULTI VITAMIN DAILY PO) Take by mouth Prodovite      Ascorbic Acid (VITAMIN C) 500 MG CAPS Take 1 tablet by mouth      Potassium Gluconate 550 MG TABS Take by mouth      Vitamin D (CHOLECALCIFEROL) 1000 UNITS CAPS capsule Take 1,000 Units by mouth daily      aspirin 81 MG tablet Take 81 mg by mouth daily. No current facility-administered medications for this visit. Controlled Substance Monitoring:    Acute and Chronic Pain Monitoring:   RX Monitoring 6/5/2020   Attestation -   Periodic Controlled Substance Monitoring No signs of potential drug abuse or diversion identified. A/P     1. OCPD traits, anxiety NOS-Cont klonopin 0.25 bid prn. Cont effexor 37.5 qd.  Sees Cards  [id number].      I have spent >25 mins with this patient on working on coping skills and med mgt, and > 1/2 of that time was spent counseling this pt.

## 2020-07-09 ENCOUNTER — TELEPHONE (OUTPATIENT)
Dept: PSYCHIATRY | Age: 85
End: 2020-07-09

## 2020-08-04 ENCOUNTER — OFFICE VISIT (OUTPATIENT)
Dept: PSYCHIATRY | Age: 85
End: 2020-08-04
Payer: MEDICARE

## 2020-08-04 PROCEDURE — 99443 PR PHYS/QHP TELEPHONE EVALUATION 21-30 MIN: CPT | Performed by: PSYCHIATRY & NEUROLOGY

## 2020-08-04 RX ORDER — CLONAZEPAM 0.5 MG/1
TABLET ORAL
Qty: 45 TABLET | Refills: 2 | Status: SHIPPED | OUTPATIENT
Start: 2020-08-04 | End: 2020-10-20 | Stop reason: SDUPTHER

## 2020-08-24 ENCOUNTER — TELEPHONE (OUTPATIENT)
Dept: FAMILY MEDICINE CLINIC | Age: 85
End: 2020-08-24

## 2020-08-24 NOTE — TELEPHONE ENCOUNTER
Patient called stating she thinks she is having side effects from venlafaxine     Patient is experiencing dizziness, sweating,   Patient is having gastro, intestinal problems. Patient wanting to see if we can change this medication or if we need an appt to discuss this. Patient stating this has been going on for about two months and wants to change this ,   she is also tired and wanting to sleep a lot.     Please advise

## 2020-08-27 RX ORDER — SERTRALINE HYDROCHLORIDE 25 MG/1
25 TABLET, FILM COATED ORAL DAILY
Qty: 30 TABLET | Refills: 5 | Status: SHIPPED | OUTPATIENT
Start: 2020-08-27 | End: 2020-10-20

## 2020-08-28 ENCOUNTER — NURSE TRIAGE (OUTPATIENT)
Dept: OTHER | Facility: CLINIC | Age: 85
End: 2020-08-28

## 2020-08-28 ENCOUNTER — TELEPHONE (OUTPATIENT)
Dept: INTERNAL MEDICINE CLINIC | Age: 85
End: 2020-08-28

## 2020-08-28 NOTE — TELEPHONE ENCOUNTER
Reason for Disposition   Lightheadedness (dizziness) present now, after 2 hours of rest and fluids    Answer Assessment - Initial Assessment Questions  1. DESCRIPTION: \"Describe your dizziness. \"      \"whirling in her head\" with a generalized weakness. The whirling is constant and has been for a long time - it has been worse in the past  2. LIGHTHEADED: \"Do you feel lightheaded? \" (e.g., somewhat faint, woozy, weak upon standing)      See above  3. VERTIGO: \"Do you feel like either you or the room is spinning or tilting? \" (i.e. vertigo)      no  4. SEVERITY: \"How bad is it? \"  \"Do you feel like you are going to faint? \" \"Can you stand and walk? \"    - MILD - walking normally    - MODERATE - interferes with normal activities (e.g., work, school)     - SEVERE - unable to stand, requires support to walk, feels like passing out now. Last 2 days, it has been severe at times. But currently she can normally walk . Sometimes, it is hard for her to walk a straight line. 5. ONSET:  \"When did the dizziness begin? \"      Ongoing for years  6. AGGRAVATING FACTORS: \"Does anything make it worse? \" (e.g., standing, change in head position)      Nothing changes it   7. HEART RATE: \"Can you tell me your heart rate? \" \"How many beats in 15 seconds? \"  (Note: not all patients can do this)        *No Answer*  8. CAUSE: \"What do you think is causing the dizziness? \"      Perhaps , r/t taking self off her effexor 2 weeks ago? 9. RECURRENT SYMPTOM: \"Have you had dizziness before? \" If so, ask: \"When was the last time? \" \"What happened that time? \"      She has had it before and told it was r/t anxiety /depression  10. OTHER SYMPTOMS: \"Do you have any other symptoms? \" (e.g., fever, chest pain, vomiting, diarrhea, bleeding)        See reason for call. These symptoms have been ongoing for years - per pt report , she's been told they are r/t anxiety and depression. No fever. \"Staying busy\" takes her mind off the symptoms.   Pt wondering if she

## 2020-08-28 NOTE — TELEPHONE ENCOUNTER
----- Message from [employer] sent at 8/28/2020 11:06 AM EDT -----  Subject: Appointment Request    Reason for Call: Urgent Return from RN Triage    QUESTIONS  Type of Appointment? Established Patient  Reason for appointment request? Other - patient wants to know if she can   have a vv about her new medication   Additional Information for Provider? Patient was transferred from nurse   triage she wants a virtual visit to talk about her new medication she was   prescribed by Dr Soco Hunter   ---------------------------------------------------------------------------  --------------  [address]  What is the best way for the office to contact you? OK to leave message on   voicemail  Preferred Call Back Phone Number? [phone]  ---------------------------------------------------------------------------  --------------  SCRIPT ANSWERS  Patient needs to be seen today or tomorrow? Yes  Nurse Name? Sarah Javed   (Did RN indicate the need for Red Scheduling?)? No  Have you been diagnosed with COVID-19 (Coronavirus)   tested for COVID-19 (Coronavirus)   or told that you are suspected of having COVID-19 (Coronavirus)? No  Have you had a fever or taken medication to treat a fever within the past   3 days? No  Have you had a cough   shortness of breath or flu-like symptoms within the past 3 days? No  Do you currently have flu-like symptoms including fever or chills   cough   shortness of breath   or difficulty breathing   or new loss of taste or smell? No  (Service Expert  click yes below to proceed with [facility] As [employer]ual   Scheduling)?  Yes

## 2020-09-15 ENCOUNTER — TELEPHONE (OUTPATIENT)
Dept: FAMILY MEDICINE CLINIC | Age: 85
End: 2020-09-15

## 2020-10-15 ENCOUNTER — TELEPHONE (OUTPATIENT)
Dept: FAMILY MEDICINE CLINIC | Age: 85
End: 2020-10-15

## 2020-10-15 NOTE — TELEPHONE ENCOUNTER
Patient is calling stating she is going to be having knee surgery next Thursday and she is having a lot anxiety about it and would like to speak with  please call her back

## 2020-10-20 ENCOUNTER — OFFICE VISIT (OUTPATIENT)
Dept: PSYCHIATRY | Age: 85
End: 2020-10-20
Payer: MEDICARE

## 2020-10-20 PROCEDURE — 99443 PR PHYS/QHP TELEPHONE EVALUATION 21-30 MIN: CPT | Performed by: PSYCHIATRY & NEUROLOGY

## 2020-10-20 RX ORDER — CLONAZEPAM 0.5 MG/1
TABLET ORAL
Qty: 45 TABLET | Refills: 2 | Status: SHIPPED | OUTPATIENT
Start: 2020-10-20 | End: 2020-12-18 | Stop reason: SDUPTHER

## 2020-10-20 NOTE — PROGRESS NOTES
Psych Follow Up Progress Note    10/20/20  Igor Whittaker  [id number]    I have reviewed recent documentation:  Igor Whittaker is a 80 y.o. female  This patient  has a past medical history of Aortic insufficiency, Breast cancer ([facility] [address] CAD (coronary artery disease), Heart attack ([facility] [facility] HTN (hypertension), Hx of blood clots, and SVT (supraventricular tachycardia) ([facility] Chief Complaint   Patient presents with    Anxiety     I called this patient today because of safety concerns regarding coronavirus. Subjective/Interval Hx:  Getting ready to get a new knee. And the prep has been no fun. Daughter Artis De Paz and son [facility] are coming up to take care of pt. Had a nasty conversation with daughter Kwasi Valenzuela where daughter was just saying very [employer], conspiratorial, and negative things to pt. And daughter oVn iDana and granddaughter Dash Carias need operations too. [facility]:  [facility]  Severity:  mildish. Context:  As above. Modifiers:  Has a hard time tolerating meds. Quality:  Anxiety. Objective:    No results found for this or any previous visit (from the past 168 hour(s)). Current Outpatient Medications   Medication Sig Dispense Refill    sertraline (ZOLOFT) 25 MG tablet Take 1 tablet by mouth daily 30 tablet 5    clonazePAM (KLONOPIN) 0.5 MG tablet Take 1/4 tablet twice daily. 45 tablet 2    albuterol sulfate HFA (VENTOLIN HFA) 108 (90 Base) MCG/ACT inhaler Inhale 2 puffs into the lungs 4 times daily for 5 days 1 Inhaler 0    gabapentin (NEURONTIN) 100 MG capsule Take 1 capsule by mouth 2 times daily for 30 days.  Intended supply: 30 days 60 capsule 1    atorvastatin (LIPITOR) 40 MG tablet Take 40 mg by mouth      TURMERIC PO Take by mouth daily      Misc Natural Products (TART CHERRY ADVANCED PO) Take by mouth daily      lisinopril (PRINIVIL;ZESTRIL) 10 MG tablet Take 1 tablet by mouth daily 30 tablet 3    amLODIPine (NORVASC) 10 MG tablet Take 10 mg by mouth daily      Omega-3 Fatty Acids (FISH OIL PO) Take 1 tablet by mouth daily      nitroGLYCERIN (NITROSTAT) 0.4 MG SL tablet Place 0.4 mg under the tongue every 5 minutes as needed for Chest pain Dissolve 1 tab under tongue at first sign of chest pain. May repeat every 5 minutes until relief is obtained. If pain persists after taking 3 tabs in a 15-minute period, or the pain is different than is typically experienced, call 9-1-1 immediately.  Multiple Vitamin (MULTI VITAMIN DAILY PO) Take by mouth Prodovite      Ascorbic Acid (VITAMIN C) 500 MG CAPS Take 1 tablet by mouth      Potassium Gluconate 550 MG TABS Take by mouth      Vitamin D (CHOLECALCIFEROL) 1000 UNITS CAPS capsule Take 1,000 Units by mouth daily      aspirin 81 MG tablet Take 81 mg by mouth daily. No current facility-administered medications for this visit. Controlled Substance Monitoring:    Acute and Chronic Pain Monitoring:   RX Monitoring 10/20/2020   Attestation -   Periodic Controlled Substance Monitoring No signs of potential drug abuse or diversion identified. A/P     1. OCPD traits, anxiety NOS-Cont klonopin 0.25 bid prn. Will increase zoloft 50 qd!  We'll defer meds, and maybe try therapy via [facility], to whom I've referred pt. Maritza Steen Dr. [id number]. I have spent >25 mins with this patient on working on coping skills and med mgt, and > 1/2 of that time was spent counseling this pt.

## 2020-11-12 ENCOUNTER — OFFICE VISIT (OUTPATIENT)
Dept: PSYCHIATRY | Age: 85
End: 2020-11-12
Payer: MEDICARE

## 2020-11-12 VITALS
DIASTOLIC BLOOD PRESSURE: 80 MMHG | OXYGEN SATURATION: 99 % | BODY MASS INDEX: 23.39 KG/M2 | HEART RATE: 84 BPM | WEIGHT: 137 LBS | HEIGHT: 64 IN | SYSTOLIC BLOOD PRESSURE: 132 MMHG

## 2020-11-12 PROCEDURE — 99214 OFFICE O/P EST MOD 30 MIN: CPT | Performed by: PSYCHIATRY & NEUROLOGY

## 2020-11-12 NOTE — PROGRESS NOTES
Psych Follow Up Progress Note    11/12/20  Elizabeth Garcia  [id number]    I have reviewed recent documentation:  lEizabeth Garcia is a 80 y.o. female  This patient  has a past medical history of Aortic insufficiency, Breast cancer ([facility] [address] CAD (coronary artery disease), [facility] HTN (hypertension), Hx of blood clots, and SVT (supraventricular tachycardia) ([facility] Chief Complaint   Patient presents with    Anxiety     Subjective/Interval Hx:  Continues to suffer from anxiety, GI issues, dizziness, imbalance. No energy. Sleeping 9 hours at night, maybe a couple hours during the day. Also feels like she's losing some cognitive function, memory (forgetting stories, short term memory), directions, visuospatial issues. Location:  [facility]  Severity:  Mild-mod. Context:  As above. Modifiers:  meds are  Quality: anxiety, depression    Objective:  Vitals:    11/12/20 1533   BP: 132/80   Pulse: 84   SpO2: 99%   Weight: 137 lb (62.1 kg)   Height: 5' 4\" (1.626 m)        Body mass index is 23.52 kg/m². No results found for this or any previous visit (from the past 168 hour(s)). Current Outpatient Medications   Medication Sig Dispense Refill    clonazePAM (KLONOPIN) 0.5 MG tablet Take 1/4 tablet twice daily. 45 tablet 2    sertraline (ZOLOFT) 50 MG tablet Take 1 tablet by mouth daily 90 tablet 1    albuterol sulfate HFA (VENTOLIN HFA) 108 (90 Base) MCG/ACT inhaler Inhale 2 puffs into the lungs 4 times daily for 5 days 1 Inhaler 0    gabapentin (NEURONTIN) 100 MG capsule Take 1 capsule by mouth 2 times daily for 30 days.  Intended supply: 30 days 60 capsule 1    atorvastatin (LIPITOR) 40 MG tablet Take 40 mg by mouth      TURMERIC PO Take by mouth daily      Misc Natural Products (TART CHERRY ADVANCED PO) Take by mouth daily      lisinopril (PRINIVIL;ZESTRIL) 10 MG tablet Take 1 tablet by mouth daily 30 tablet 3    amLODIPine (NORVASC) 10 MG tablet Take 10 mg by mouth daily      Omega-3 Fatty Acids (FISH OIL PO) Take 1 tablet by mouth daily      nitroGLYCERIN (NITROSTAT) 0.4 MG SL tablet Place 0.4 mg under the tongue every 5 minutes as needed for Chest pain Dissolve 1 tab under tongue at first sign of chest pain. May repeat every 5 minutes until relief is obtained. If pain persists after taking 3 tabs in a 15-minute period, or the pain is different than is typically experienced, call 9-1-1 immediately.  Multiple Vitamin (MULTI VITAMIN DAILY PO) Take by mouth Prodovite      Ascorbic Acid (VITAMIN C) 500 MG CAPS Take 1 tablet by mouth      Potassium Gluconate 550 MG TABS Take by mouth      Vitamin D (CHOLECALCIFEROL) 1000 UNITS CAPS capsule Take 1,000 Units by mouth daily      aspirin 81 MG tablet Take 81 mg by mouth daily. No current facility-administered medications for this visit. ROS:  No tremor, gait not bad. MSE:    A-casually dressed, good EC, pleasant and engageable  A-maybe a little anxious  M-sort of anxious and depressed  S-grossly A + O  I/J-fair/fair  T-linear, goal-directed, speech c nl r/t/v/a. No S/H I, no resp to int stim. MoCA 11/12/20=>25/30    Controlled Substance Monitoring:    Acute and Chronic Pain Monitoring:   RX Monitoring 11/12/2020   Attestation -   Periodic Controlled Substance Monitoring No signs of potential drug abuse or diversion identified. A/P 79 y/o F c      1. OCPD traits, anxiety NOS-Cont klonopin 0.25 bid prn. We'll inc zoloft 75 qd. Neeru Price never got back to ptEllie [id number].       Has been on prozac, zoloft, effexor, klonopin, xanax. I have spent >25 mins with this patient on working on coping skills and med mgt, and > 1/2 of that time was spent counseling this pt.

## 2020-12-17 ENCOUNTER — TELEPHONE (OUTPATIENT)
Dept: PSYCHIATRY | Age: 85
End: 2020-12-17

## 2020-12-17 NOTE — TELEPHONE ENCOUNTER
PT called in wanting to speak to Lorenza Chester or Dr. Annie Collier regarding her zoloft. PT says that she's been cutting back and she's been feeling dizzy and sick.      Please call PT back: [phone]

## 2020-12-18 ENCOUNTER — OFFICE VISIT (OUTPATIENT)
Dept: PSYCHIATRY | Age: 85
End: 2020-12-18
Payer: MEDICARE

## 2020-12-18 PROCEDURE — 99443 PR PHYS/QHP TELEPHONE EVALUATION 21-30 MIN: CPT | Performed by: PSYCHIATRY & NEUROLOGY

## 2020-12-18 RX ORDER — CLONAZEPAM 0.5 MG/1
TABLET ORAL
Qty: 45 TABLET | Refills: 2 | Status: SHIPPED | OUTPATIENT
Start: 2021-01-16 | End: 2021-02-23 | Stop reason: SDUPTHER

## 2020-12-18 NOTE — PROGRESS NOTES
Psych Follow Up Progress Note    12/18/20  Rhett Vargas  [id number]    I have reviewed recent documentation:  Rhett Vargas is a 80 y.o. female  This patient  has a past medical history of Aortic insufficiency, Breast cancer ([facility] [address] CAD (coronary artery disease), Heart attack ([facility] [address] HTN (hypertension), Hx of blood clots, and SVT (supraventricular tachycardia) ([facility] [facility] Chief Complaint   Patient presents with    Anxiety       I called this patient today because of safety concerns regarding coronavirus. Subjective/Interval Hx:  Still feeling quite dizzy and off-balance. Weaned self off zoloft. Feeling the same, though. Tried calling Luís Carrier, did get one call back, but still hasn't been able to get ahold of her. Location:  [facility]  Severity:  mod  Context:  As above. Modifiers:  meds not helping  Quality:  Anxiety. Objective:      No results found for this or any previous visit (from the past 168 hour(s)). Current Outpatient Medications   Medication Sig Dispense Refill    sertraline (ZOLOFT) 50 MG tablet Take 1.5 tablets by mouth daily 45 tablet 5    clonazePAM (KLONOPIN) 0.5 MG tablet Take 1/4 tablet twice daily. 45 tablet 2    albuterol sulfate HFA (VENTOLIN HFA) 108 (90 Base) MCG/ACT inhaler Inhale 2 puffs into the lungs 4 times daily for 5 days 1 Inhaler 0    gabapentin (NEURONTIN) 100 MG capsule Take 1 capsule by mouth 2 times daily for 30 days.  Intended supply: 30 days 60 capsule 1    atorvastatin (LIPITOR) 40 MG tablet Take 40 mg by mouth      TURMERIC PO Take by mouth daily      Misc Natural Products (TART CHERRY ADVANCED PO) Take by mouth daily      lisinopril (PRINIVIL;ZESTRIL) 10 MG tablet Take 1 tablet by mouth daily 30 tablet 3    amLODIPine (NORVASC) 10 MG tablet Take 10 mg by mouth daily      Omega-3 Fatty Acids (FISH OIL PO) Take 1 tablet by mouth daily  nitroGLYCERIN (NITROSTAT) 0.4 MG SL tablet Place 0.4 mg under the tongue every 5 minutes as needed for Chest pain Dissolve 1 tab under tongue at first sign of chest pain. May repeat every 5 minutes until relief is obtained. If pain persists after taking 3 tabs in a 15-minute period, or the pain is different than is typically experienced, call 9-1-1 immediately.  Multiple Vitamin (MULTI VITAMIN DAILY PO) Take by mouth Prodovite      Ascorbic Acid (VITAMIN C) 500 MG CAPS Take 1 tablet by mouth      Potassium Gluconate 550 MG TABS Take by mouth      Vitamin D (CHOLECALCIFEROL) 1000 UNITS CAPS capsule Take 1,000 Units by mouth daily      aspirin 81 MG tablet Take 81 mg by mouth daily. No current facility-administered medications for this visit. Controlled Substance Monitoring:    Acute and Chronic Pain Monitoring:   RX Monitoring 12/18/2020   Attestation -   Periodic Controlled Substance Monitoring No signs of potential drug abuse or diversion identified. A/P 79 y/o F c      1. OCPD traits, anxiety NOS-Cont klonopin 0.25 bid prn.  Pt is now off [facility] Dr. [id number]. She'll try a therapist from her insurance list.       Has been on prozac, zoloft, effexor, klonopin, xanax. I have spent >25 mins with this patient on working on coping skills and med mgt, and > 1/2 of that time was spent counseling this pt.

## 2020-12-23 ENCOUNTER — TELEPHONE (OUTPATIENT)
Dept: PRIMARY CARE CLINIC | Age: 85
End: 2020-12-23

## 2021-01-07 ENCOUNTER — TELEPHONE (OUTPATIENT)
Dept: FAMILY MEDICINE CLINIC | Age: 86
End: 2021-01-07

## 2021-01-07 NOTE — TELEPHONE ENCOUNTER
Patient is calling stating she went off the zoloft on 12/17 and she is withdrawing she is having issues with tremors, nausea, dizzy, off balance can you please have  give her a call her asap she wants to speak with him

## 2021-01-14 ENCOUNTER — VIRTUAL VISIT (OUTPATIENT)
Dept: PSYCHIATRY | Age: 86
End: 2021-01-14
Payer: MEDICARE

## 2021-01-14 DIAGNOSIS — F41.9 ANXIETY: Primary | ICD-10-CM

## 2021-01-14 DIAGNOSIS — F32.A DEPRESSION, UNSPECIFIED DEPRESSION TYPE: ICD-10-CM

## 2021-01-14 PROCEDURE — 99443 PR PHYS/QHP TELEPHONE EVALUATION 21-30 MIN: CPT | Performed by: PSYCHIATRY & NEUROLOGY

## 2021-01-14 NOTE — PROGRESS NOTES
Psych Follow Up Progress Note    1/14/21  Rheba Ahumada  [id number]    I have reviewed recent documentation:  Rheba Ahumada is a 80 y.o. female  This patient  has a past medical history of Aortic insufficiency, Breast cancer ([facility] [address] CAD (coronary artery disease), Heart attack ([facility] [address] HTN (hypertension), Hx of blood clots, and SVT (supraventricular tachycardia) ([facility] [address] Chief Complaint   Patient presents with    Anxiety     I called this patient today because of safety concerns regarding coronavirus. Subjective/Interval Hx:   Had some nasty withdrawal from taking self off zoloft. Even had a bit of tremor in the evening. Family sort of takes pt to task for rejecting all the med routes. Dizziness hasn't been great, so now she's doing some PT for that in a mild way. Enjoying time with a  friend. Location:  [facility]  Severity: mod  Context:  As above. Modifiers:  meds only so helpful. Quality:  Anxiety    Objective:    No results found for this or any previous visit (from the past 168 hour(s)). Current Outpatient Medications   Medication Sig Dispense Refill    [START ON 1/16/2021] clonazePAM (KLONOPIN) 0.5 MG tablet Take 1/4 tablet twice daily. 45 tablet 2    sertraline (ZOLOFT) 50 MG tablet Take 1.5 tablets by mouth daily 45 tablet 5    albuterol sulfate HFA (VENTOLIN HFA) 108 (90 Base) MCG/ACT inhaler Inhale 2 puffs into the lungs 4 times daily for 5 days 1 Inhaler 0    gabapentin (NEURONTIN) 100 MG capsule Take 1 capsule by mouth 2 times daily for 30 days.  Intended supply: 30 days 60 capsule 1    atorvastatin (LIPITOR) 40 MG tablet Take 40 mg by mouth      TURMERIC PO Take by mouth daily      Misc Natural Products (TART CHERRY ADVANCED PO) Take by mouth daily      lisinopril (PRINIVIL;ZESTRIL) 10 MG tablet Take 1 tablet by mouth daily 30 tablet 3    amLODIPine (NORVASC) 10 MG tablet Take 10 mg by mouth daily      Omega-3 Fatty Acids (FISH OIL PO) Take 1 tablet by mouth daily  nitroGLYCERIN (NITROSTAT) 0.4 MG SL tablet Place 0.4 mg under the tongue every 5 minutes as needed for Chest pain Dissolve 1 tab under tongue at first sign of chest pain. May repeat every 5 minutes until relief is obtained. If pain persists after taking 3 tabs in a 15-minute period, or the pain is different than is typically experienced, call 9-1-1 immediately.  Multiple Vitamin (MULTI VITAMIN DAILY PO) Take by mouth Prodovite      Ascorbic Acid (VITAMIN C) 500 MG CAPS Take 1 tablet by mouth      Potassium Gluconate 550 MG TABS Take by mouth      Vitamin D (CHOLECALCIFEROL) 1000 UNITS CAPS capsule Take 1,000 Units by mouth daily      aspirin 81 MG tablet Take 81 mg by mouth daily. No current facility-administered medications for this visit. Controlled Substance Monitoring:    Acute and Chronic Pain Monitoring:   RX Monitoring 1/14/2021   Attestation -   Periodic Controlled Substance Monitoring No signs of potential drug abuse or diversion identified. A/P 85 y/o F c      1. OCPD traits, anxiety NOS-I'll take zoloft off pt's list.  Cont klonopin 0.25 bid prn.  Sees Cards Dr. Lali Meek, [facility]-beh therapist in the near future.      Has been on prozac, zoloft, effexor, klonopin, xanax. I have spent >21 mins with this patient on working on coping skills and med mgt, and > 1/2 of that time was spent counseling this pt.

## 2021-02-01 ENCOUNTER — TELEPHONE (OUTPATIENT)
Dept: FAMILY MEDICINE CLINIC | Age: 86
End: 2021-02-01

## 2021-02-01 ENCOUNTER — OFFICE VISIT (OUTPATIENT)
Dept: PSYCHIATRY | Age: 86
End: 2021-02-01
Payer: MEDICARE

## 2021-02-01 DIAGNOSIS — F41.9 ANXIETY: Primary | ICD-10-CM

## 2021-02-01 PROCEDURE — 99441 PR PHYS/QHP TELEPHONE EVALUATION 5-10 MIN: CPT | Performed by: PSYCHIATRY & NEUROLOGY

## 2021-02-01 RX ORDER — PAROXETINE 10 MG/1
5 TABLET, FILM COATED ORAL DAILY
Qty: 15 TABLET | Refills: 2 | Status: SHIPPED | OUTPATIENT
Start: 2021-02-01 | End: 2021-03-30

## 2021-02-01 NOTE — TELEPHONE ENCOUNTER
Patient is calling stating she is currently taking zoloft and she wanted to talk to [address] [place] [address] about some of the side effects that she has been experiencing she states she feels dizzy, causing a increase of anxiety.  She also states she is having issues with sweating, nausea and tremors please advise

## 2021-02-01 NOTE — PROGRESS NOTES
Psych Follow Up Progress Note    2/1/21  Kesha Matson  [id number]    I have reviewed recent documentation:  Kesha Matson is a 80 y.o. female  This patient  has a past medical history of Aortic insufficiency, Breast cancer ([facility] [address] CAD (coronary artery disease), Heart attack ([facility] [address] HTN (hypertension), Hx of blood clots, and SVT (supraventricular tachycardia) ([facility] Chief Complaint   Patient presents with    Anxiety       I called this patient today because of safety concerns regarding coronavirus. Subjective/Interval Hx:  Continues to have sx of nausea, dizziness, tremor. Even without meds. Location:  [facility]  Severity:  Context:  As above. Modifiers:  Quality:    Objective:      No results found for this or any previous visit (from the past 168 hour(s)). Current Outpatient Medications   Medication Sig Dispense Refill    clonazePAM (KLONOPIN) 0.5 MG tablet Take 1/4 tablet twice daily. 45 tablet 2    albuterol sulfate HFA (VENTOLIN HFA) 108 (90 Base) MCG/ACT inhaler Inhale 2 puffs into the lungs 4 times daily for 5 days 1 Inhaler 0    gabapentin (NEURONTIN) 100 MG capsule Take 1 capsule by mouth 2 times daily for 30 days.  Intended supply: 30 days 60 capsule 1    atorvastatin (LIPITOR) 40 MG tablet Take 40 mg by mouth      TURMERIC PO Take by mouth daily      Misc Natural Products (TART CHERRY ADVANCED PO) Take by mouth daily      lisinopril (PRINIVIL;ZESTRIL) 10 MG tablet Take 1 tablet by mouth daily 30 tablet 3    amLODIPine (NORVASC) 10 MG tablet Take 10 mg by mouth daily      Omega-3 Fatty Acids (FISH OIL PO) Take 1 tablet by mouth daily  nitroGLYCERIN (NITROSTAT) 0.4 MG SL tablet Place 0.4 mg under the tongue every 5 minutes as needed for Chest pain Dissolve 1 tab under tongue at first sign of chest pain. May repeat every 5 minutes until relief is obtained. If pain persists after taking 3 tabs in a 15-minute period, or the pain is different than is typically experienced, call 9-1-1 immediately.  Multiple Vitamin (MULTI VITAMIN DAILY PO) Take by mouth Prodovite      Ascorbic Acid (VITAMIN C) 500 MG CAPS Take 1 tablet by mouth      Potassium Gluconate 550 MG TABS Take by mouth      Vitamin D (CHOLECALCIFEROL) 1000 UNITS CAPS capsule Take 1,000 Units by mouth daily      aspirin 81 MG tablet Take 81 mg by mouth daily. No current facility-administered medications for this visit. Controlled Substance Monitoring:    Acute and Chronic Pain Monitoring:   RX Monitoring 2/1/2021   Attestation -   Periodic Controlled Substance Monitoring No signs of potential drug abuse or diversion identified. A/P 85 y/o F c      1. OCPD traits, anxiety NOS-We'll try a tiny bit of paxil:  Cont klonopin 0.25 bid prn.  Sees Cards Dr. Ishan Gallardo, [facility] [place]-[facility] therapist in the near future.      Has been on prozac, zoloft, effexor, klonopin, xanax, lexapro.        I have spent >6 mins with this patient on working on coping skills and med mgt, and > 1/2 of that time was spent counseling this pt.

## 2021-02-23 ENCOUNTER — VIRTUAL VISIT (OUTPATIENT)
Dept: PSYCHIATRY | Age: 86
End: 2021-02-23
Payer: MEDICARE

## 2021-02-23 DIAGNOSIS — F32.A DEPRESSION, UNSPECIFIED DEPRESSION TYPE: Primary | ICD-10-CM

## 2021-02-23 DIAGNOSIS — F41.9 ANXIETY: ICD-10-CM

## 2021-02-23 PROCEDURE — 99443 PR PHYS/QHP TELEPHONE EVALUATION 21-30 MIN: CPT | Performed by: PSYCHIATRY & NEUROLOGY

## 2021-02-23 RX ORDER — CLONAZEPAM 0.5 MG/1
TABLET ORAL
Qty: 45 TABLET | Refills: 2 | Status: SHIPPED | OUTPATIENT
Start: 2021-02-23 | End: 2021-06-10

## 2021-02-23 NOTE — PROGRESS NOTES
Psych Follow Up Progress Note    2/23/21  Clary Crowder  [id number]    I have reviewed recent documentation:  Clary Crowder is a 80 y.o. female  This patient  has a past medical history of Aortic insufficiency, Breast cancer ([facility] [address] CAD (coronary artery disease), Heart attack ([facility] HTN (hypertension), Hx of blood clots, and SVT (supraventricular tachycardia) ([facility] Chief Complaint   Patient presents with    Anxiety     I called this patient today because of safety concerns regarding coronavirus. Subjective/Interval Hx:  Paxil seems to help. She takes it at night and sleeps through the dizziness. Doing good work in CBT! Went on a drive! And has a drive planned for today. And the anxiety is that she'll get lost.  Had a nice call with  of the [employer]! Nausea and appetite still not great. Being alone and not getting feedback from anyone is hard. Going to get some spinal traction for back pain. Location:  [facility]  Severity:  Mod  Context:  As above. Modifiers:  meds and cbt are maybe helping  Quality:  Anxiety. Objective:    No results found for this or any previous visit (from the past 168 hour(s)). Current Outpatient Medications   Medication Sig Dispense Refill    PARoxetine (PAXIL) 10 MG tablet Take 0.5 tablets by mouth daily 15 tablet 2    clonazePAM (KLONOPIN) 0.5 MG tablet Take 1/4 tablet twice daily. 45 tablet 2    albuterol sulfate HFA (VENTOLIN HFA) 108 (90 Base) MCG/ACT inhaler Inhale 2 puffs into the lungs 4 times daily for 5 days 1 Inhaler 0    gabapentin (NEURONTIN) 100 MG capsule Take 1 capsule by mouth 2 times daily for 30 days.  Intended supply: 30 days 60 capsule 1    atorvastatin (LIPITOR) 40 MG tablet Take 40 mg by mouth      TURMERIC PO Take by mouth daily      Misc Natural Products (TART CHERRY ADVANCED PO) Take by mouth daily      lisinopril (PRINIVIL;ZESTRIL) 10 MG tablet Take 1 tablet by mouth daily 30 tablet 3  amLODIPine (NORVASC) 10 MG tablet Take 10 mg by mouth daily      Omega-3 Fatty Acids (FISH OIL PO) Take 1 tablet by mouth daily      nitroGLYCERIN (NITROSTAT) 0.4 MG SL tablet Place 0.4 mg under the tongue every 5 minutes as needed for Chest pain Dissolve 1 tab under tongue at first sign of chest pain. May repeat every 5 minutes until relief is obtained. If pain persists after taking 3 tabs in a 15-minute period, or the pain is different than is typically experienced, call 9-1-1 immediately.  Multiple Vitamin (MULTI VITAMIN DAILY PO) Take by mouth Prodovite      Ascorbic Acid (VITAMIN C) 500 MG CAPS Take 1 tablet by mouth      Potassium Gluconate 550 MG TABS Take by mouth      Vitamin D (CHOLECALCIFEROL) 1000 UNITS CAPS capsule Take 1,000 Units by mouth daily      aspirin 81 MG tablet Take 81 mg by mouth daily. No current facility-administered medications for this visit. A/P 87 y/o F c      1. OCPD traits, anxiety NOS-We'll try a tiny bit of paxil 5 mg. Cont klonopin 0.25 bid prn.  [place] Cards Dr. Adalberto Melchor[place] [facility] [place] [facility] [place]     Has been on prozac, zoloft, effexor, klonopin, xanax, lexapro, paxil.      I have spent >21 mins with this patient on working on coping skills and med mgt, and > 1/2 of that time was spent counseling this pt.

## 2021-03-11 ENCOUNTER — TELEPHONE (OUTPATIENT)
Dept: FAMILY MEDICINE CLINIC | Age: 86
End: 2021-03-11

## 2021-03-15 NOTE — TELEPHONE ENCOUNTER
Called pt and she is going to stop the medication due to side effects. Pt is only taking 5 mg right now. Would like a new medication called in.  Appt on 3/19

## 2021-03-15 NOTE — TELEPHONE ENCOUNTER
Patient calling again about the side effects she is having from the Paxil. States she called last Thursday and no one has called her back.

## 2021-03-19 RX ORDER — FLUVOXAMINE MALEATE 25 MG
12.5 TABLET ORAL NIGHTLY
Qty: 30 TABLET | Refills: 3 | Status: SHIPPED | OUTPATIENT
Start: 2021-03-19 | End: 2021-05-07 | Stop reason: SDUPTHER

## 2021-03-19 NOTE — TELEPHONE ENCOUNTER
OK, she can get off paxil. We'll try the lowest dose of fluvoaxamine:  12.5 at bedtime. She'll have to get the 25 mg tab and cut it in 1/2, though to get that low. She should just replace paxil with fluvoxamine. I sent in the fluvoxamine rx already.

## 2021-03-30 ENCOUNTER — OFFICE VISIT (OUTPATIENT)
Dept: PSYCHIATRY | Age: 86
End: 2021-03-30
Payer: MEDICARE

## 2021-03-30 DIAGNOSIS — F41.9 ANXIETY: ICD-10-CM

## 2021-03-30 DIAGNOSIS — F32.A DEPRESSION, UNSPECIFIED DEPRESSION TYPE: Primary | ICD-10-CM

## 2021-03-30 PROCEDURE — 99443 PR PHYS/QHP TELEPHONE EVALUATION 21-30 MIN: CPT | Performed by: PSYCHIATRY & NEUROLOGY

## 2021-03-30 RX ORDER — CLONAZEPAM 0.5 MG/1
TABLET ORAL
Qty: 45 TABLET | Refills: 1 | Status: SHIPPED | OUTPATIENT
Start: 2021-04-25 | End: 2021-06-10 | Stop reason: SDUPTHER

## 2021-04-05 NOTE — TELEPHONE ENCOUNTER
Called pt and scheduled appt for today. 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 Last echocardiogram 3/12/21 showed LVEF 55-60%, normal RV size and function, severe posterior leaflet and eccentric severe mitral regurgitation trace TR, moderate pulmonary hypertension PA pressure estimatd at 58 mmHg, trace AI.     Assessment of LVEF (Must be within 6 months):       EF:   55-60%        Assessed by:   Echocardiogram        Date:   3/12/21         chlorhexidine 4% Liquid 1 Application(s) Topical once                        12.8   4.66  )-----------( 221      ( 05 Apr 2021 07:35 )             40.0       Hemoglobin: 12.8 g/dL (04-05 @ 07:35)    04-05    141  |  106  |  15.0  ----------------------------<  97  4.2   |  29.0  |  0.64    Ca    9.5      05 Apr 2021 07:35      Creatinine Trend: 0.64<--    COAGS: PT/INR - ( 05 Apr 2021 07:35 )   PT: 11.1 sec;   INR: 0.95 ratio      PTT - ( 05 Apr 2021 07:35 )  PTT:34.2 sec      PHYSICAL EXAM:  Vital Signs last 24 Hours   T(C): 36.6 (04-05-21 @ 07:46), Max: 36.6 (04-05-21 @ 07:46)  HR: 79 (04-05-21 @ 07:46) (79 - 79)  BP: 122/68 (04-05-21 @ 07:46) (122/68 - 122/68)  RR: 18 (04-05-21 @ 07:46) (18 - 18)  SpO2: 99% (04-05-21 @ 07:46) (99% - 99%)    Gen: Appears well in NAD  HEENT:  (-)icterus (-)pallor  CV: N S1 S2 1/6 CHENCHO, no r/m/g,  (+)2 Pulses B/l  Resp:  Clear to auscultation B/L, normal effort    GI: (+) BS Soft, NT, ND  Lymph:  (-)Edema, (-)obvious lymphadenopathy  Skin: Warm to touch, Normal turgor  Psych: Appropriate mood and affect    DIAGNOSTICS:     < from: PARVEEN Echo Doppler (04.05.21 @ 09:01) >  PHYSICIAN INTERPRETATION:  Left Ventricle: The left ventricular internal cavity size is normal.  Left ventricular ejection fraction, by visual estimation, is 60 to 65%. Elevated mean left atrial pressure.  Right Ventricle: The right ventricular size is normal. RV systolic function is normal.  Left Atrium: Moderate to severe left atrial enlargement. No left atrial appendage thrombus is seen and normal left atrial appendage velocities. No evidenceof SEC nor thrombus in LA/PATRICIA. Max LA diameter 6.4 cm. Color flow doppler and intravenous injection of agitated saline demonstrates the presence of an intact intra atrial septum.  Right Atrium: Normal right atrial size.  Pericardium: There is no evidence of pericardial effusion.  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 ERO 0.8 cm2, regurgitant volume 111 ml. Confirmed with 3 D imaging.  Tricuspid Valve: The tricuspid valve is normal in structure. Trivial tricuspid regurgitation is visualized.  Aortic Valve: The aortic valve is trileaflet. Sclerotic aortic valve with normal opening. Trivial aortic valve regurgitation is seen.  Pulmonic Valve: Structurally normal pulmonic valve, with normal leaflet excursion.  Aorta: The aortic root, ascending aorta, aortic arch and descending aorta are all structurally normal, with no evidence of dilitation or obstruction. Simple atheroma seen in the descending aorta.  Venous: All four pulmonary veins are normal. A pattern of systolic flow reversal, suggestive of severe mitral regurgitation is recorded from all four pulmonary veins.  Shunts: Agitated saline contrast was given intravenously to evaluate for intracardiac shunting. There is no evidence of a patent foramen ovale.      Summary:   1. Left ventricular ejection fraction, by visual estimation, is 60 to 65%.   2. Normal left ventricular internal cavity size.   3. 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 ERO 0.8 cm2, regurgitant volume 111 ml. Confirmed with 3 D imaging.   4. Moderate to severe left atrial enlargement.   5. Elevated mean left atrial pressure.   6. No evidence of SEC nor thrombus in LA/PATRICIA. Max LA diameter 6.4 cm.   7. Normal right atrial size.   8. Sclerotic aortic valve with normal opening.   9. Color flow doppler and intravenous injection of agitated saline demonstrates the presence of an intact intra atrial septum.  10. No left atrial appendage thrombus and normal left atrial appendage velocities.    MD Satish Electronically signed on 4/5/2021 at 9:44:05 AM    *** Final ***      < end of copied text >    ASSESSMENT AND PLAN:     71 yo female with PMHX of HLD, Mitral Valve Prolapse with previous moderate mitral vegetation with normal left ventricular function, rate PSVT , previously documented atrial septal aneurysm without shunt who presents now for follow up. Last echocardiogram 3/12/21 showed LVEF 55-60%, normal RV size and function, severe posterior leaflet and eccentric severe mitral regurgitation trace TR, moderate pulmonary hypertension PA pressure estimatd at 58 mmHg, trace AI.  Concern for ruptured chordae. Had transesophageal echocardiogram today to evaluate heart anatomy.       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 Trace TR. ERO 0.8 cm2, regurgitant volume 111 ml  -NPO until 11:10  -continue current medical management   -will need right and left heart catheterization  -Valve repair   -follow with Dr. Loredo                               [facility]                                   [address] [place] [address]                                                      PHONE: [phone]                                                         FAX: [phone]  [email or web address]  ---------------------------------------------------------------------------------------------------------------------------------    Overnight events/patient complaints: here for PARVEEN to assess MR      No Known Drug Allergies  patient is allergic to Spinach (Unknown)    MEDICATIONS  (STANDING):  chlorhexidine 4% Liquid 1 Application(s) Topical once    MEDICATIONS  (PRN):      Vital Signs Last 24 Hrs  T(C): 36.6 (05 Apr 2021 07:46), Max: 36.6 (05 Apr 2021 07:46)  T(F): 97.9 (05 Apr 2021 07:46), Max: 97.9 (05 Apr 2021 07:46)  HR: 79 (05 Apr 2021 07:46) (79 - 79)  BP: 122/68 (05 Apr 2021 07:46) (122/68 - 122/68)  BP(mean): --  RR: 18 (05 Apr 2021 07:46) (18 - 18)  SpO2: 99% (05 Apr 2021 07:46) (99% - 99%)  Daily Height in cm: 152.4 (05 Apr 2021 07:46)    Daily   ICU Vital Signs Last 24 Hrs  YORDAN COSTA  I&O's Detail    I&O's Summary    Drug Dosing Weight  YORDAN COSTA      PHYSICAL EXAM:  General: Appears well developed, well nourished alert and cooperative.  HEENT: Head; normocephalic, atraumatic.  Eyes: Pupils reactive, cornea wnl.  Neck: Supple, no nodes adenopathy, no NVD or carotid bruit or thyromegaly.  CARDIOVASCULAR: Normal S1 and S2, 3/6 systolic murmur at apex  LUNGS: No rales, rhonchi or wheeze. Normal breath sounds bilaterally.  ABDOMEN: Soft, nontender without mass or organomegaly. bowel sounds normoactive.  EXTREMITIES: No clubbing, cyanosis or edema. Distal pulses wnl.   SKIN: warm and dry with normal turgor.  NEURO: Alert/oriented x 3/normal motor exam. No pathologic reflexes.    PSYCH: normal affect.        LABS:                        12.8   4.66  )-----------( 221      ( 05 Apr 2021 07:35 )             40.0     04-05    141  |  106  |  15.0  ----------------------------<  97  4.2   |  29.0  |  0.64    Ca    9.5      05 Apr 2021 07:35      YORDAN COSTA      PT/INR - ( 05 Apr 2021 07:35 )   PT: 11.1 sec;   INR: 0.95 ratio         PTT - ( 05 Apr 2021 07:35 )  PTT:34.2 sec      PARVEEN performed with anesthesia standby, pt tolerated well  Results:  LVEF 65%  Flail P2 MV with ruptured chordae and severe eccentric MR  Severe LAE  tratce TR/AI  Normal RV function  No PFO seen

## 2021-05-07 ENCOUNTER — OFFICE VISIT (OUTPATIENT)
Dept: PSYCHIATRY | Age: 86
End: 2021-05-07
Payer: MEDICARE

## 2021-05-07 VITALS
OXYGEN SATURATION: 98 % | HEART RATE: 87 BPM | WEIGHT: 135 LBS | BODY MASS INDEX: 23.17 KG/M2 | DIASTOLIC BLOOD PRESSURE: 74 MMHG | SYSTOLIC BLOOD PRESSURE: 144 MMHG

## 2021-05-07 DIAGNOSIS — F32.A DEPRESSION, UNSPECIFIED DEPRESSION TYPE: Primary | ICD-10-CM

## 2021-05-07 DIAGNOSIS — R41.3 MEMORY CHANGES: ICD-10-CM

## 2021-05-07 DIAGNOSIS — F41.9 ANXIETY: ICD-10-CM

## 2021-05-07 PROCEDURE — 99214 OFFICE O/P EST MOD 30 MIN: CPT | Performed by: PSYCHIATRY & NEUROLOGY

## 2021-05-07 RX ORDER — FLUVOXAMINE MALEATE 25 MG
25 TABLET ORAL NIGHTLY
Qty: 30 TABLET | Refills: 3 | Status: SHIPPED | OUTPATIENT
Start: 2021-05-07 | End: 2021-06-10 | Stop reason: SDUPTHER

## 2021-05-07 NOTE — PROGRESS NOTES
Psych Follow Up Progress Note    5/7/21  Bobbi Li  [id number]    I have reviewed recent documentation:  Bobbi Li is a 80 y.o. female  This patient  has a past medical history of Aortic insufficiency, Breast cancer ([facility] [address] CAD (coronary artery disease), Heart attack ([facility] [address] HTN (hypertension), Hx of blood clots, and SVT (supraventricular tachycardia) ([facility] [address] Chief Complaint   Patient presents with    Anxiety    Depression       Subjective/Interval Hx:  Crying at times. Somewhat depressed. Cleaning the house out of a lot of stuff, including lots of Helio's stuff. Has to grieve at times. Enjoys living c Alonso Curd, by and large. Hates the dizziness. Location:  [facility]  Severity:  mod  Context:  As above. Modifiers:  meds not helping all that much, yet  Quality:  anxiety    Objective:  Vitals:    05/07/21 1625   BP: (!) 144/74   Pulse: 87   SpO2: 98%   Weight: 135 lb (61.2 kg)     Body mass index is 23.17 kg/m². No results found for this or any previous visit (from the past 168 hour(s)). Current Outpatient Medications   Medication Sig Dispense Refill    clonazePAM (KLONOPIN) 0.5 MG tablet Take 1/4 tab twice daily. 45 tablet 1    fluvoxaMINE (LUVOX) 25 MG tablet Take 0.5 tablets by mouth nightly 30 tablet 3    clonazePAM (KLONOPIN) 0.5 MG tablet Take 1/4 tablet twice daily. 45 tablet 2    albuterol sulfate HFA (VENTOLIN HFA) 108 (90 Base) MCG/ACT inhaler Inhale 2 puffs into the lungs 4 times daily for 5 days 1 Inhaler 0    gabapentin (NEURONTIN) 100 MG capsule Take 1 capsule by mouth 2 times daily for 30 days.  Intended supply: 30 days 60 capsule 1    atorvastatin (LIPITOR) 40 MG tablet Take 40 mg by mouth      TURMERIC PO Take by mouth daily      Misc Natural Products (TART CHERRY ADVANCED PO) Take by mouth daily      lisinopril (PRINIVIL;ZESTRIL) 10 MG tablet Take 1 tablet by mouth daily 30 tablet 3    amLODIPine (NORVASC) 10 MG tablet Take 10 mg by mouth daily      Omega-3 Fatty Acids (FISH OIL PO) Take 1 tablet by mouth daily      nitroGLYCERIN (NITROSTAT) 0.4 MG SL tablet Place 0.4 mg under the tongue every 5 minutes as needed for Chest pain Dissolve 1 tab under tongue at first sign of chest pain. May repeat every 5 minutes until relief is obtained. If pain persists after taking 3 tabs in a 15-minute period, or the pain is different than is typically experienced, call 9-1-1 immediately.  Multiple Vitamin (MULTI VITAMIN DAILY PO) Take by mouth Prodovite      Ascorbic Acid (VITAMIN C) 500 MG CAPS Take 1 tablet by mouth      Potassium Gluconate 550 MG TABS Take by mouth      Vitamin D (CHOLECALCIFEROL) 1000 UNITS CAPS capsule Take 1,000 Units by mouth daily      aspirin 81 MG tablet Take 81 mg by mouth daily. No current facility-administered medications for this visit. ROS:  No tremor, gait nl    MSE:  A-casually dressed, good EC, pleasant and engageable  A-restricted to full  M-somewhat depressed  S-grossly A + O  I/J-fair/fair  T-linear, goal-directed. Speech c nl r/t/v/a. No S/H I, no A/v H. A/P 85 y/o F c      1. OCPD traits, anxiety NOS-We'll inc fluvoxamine 25 qhs.  Cont klonopin 0.25 bid prn.  Sees Cards Dr. Jae Alcazar, [facility]!  She's working with pt on \"acceptance. \"  And this is hard, but perhaps necessary.     2.  Memory trouble-Referred to [facility] d/o center.       Has been on prozac, zoloft, effexor, klonopin, xanax, lexapro, paxil.      I have spent >21 mins with this patient on working on coping skills and med mgt, and > 1/2 of that time was spent counseling this pt.

## 2021-05-11 ENCOUNTER — TELEPHONE (OUTPATIENT)
Dept: PSYCHIATRY | Age: 86
End: 2021-05-11

## 2021-05-11 NOTE — TELEPHONE ENCOUNTER
----- Message from Charu Silvestre sent at 5/11/2021 10:44 AM EDT -----  Subject: Message to Provider    QUESTIONS  Information for Provider? Patient states the referral Dr. Genie Gonzales gave   her must be faxed to [facility] urology . The fax number is [phone].   ---------------------------------------------------------------------------  --------------  Italia Perquimanskatherine ESTRELLA  What is the best way for the office to contact you? OK to leave message on   voicemail  Preferred Call Back Phone Number? [phone]  ---------------------------------------------------------------------------  --------------  SCRIPT ANSWERS  Relationship to Patient?  Self

## 2021-06-10 ENCOUNTER — OFFICE VISIT (OUTPATIENT)
Dept: PSYCHIATRY | Age: 86
End: 2021-06-10
Payer: MEDICARE

## 2021-06-10 VITALS
WEIGHT: 136 LBS | DIASTOLIC BLOOD PRESSURE: 80 MMHG | OXYGEN SATURATION: 99 % | SYSTOLIC BLOOD PRESSURE: 128 MMHG | HEART RATE: 75 BPM | BODY MASS INDEX: 23.34 KG/M2

## 2021-06-10 DIAGNOSIS — R41.3 MEMORY CHANGES: ICD-10-CM

## 2021-06-10 DIAGNOSIS — F41.9 ANXIETY: ICD-10-CM

## 2021-06-10 DIAGNOSIS — F32.A DEPRESSION, UNSPECIFIED DEPRESSION TYPE: Primary | ICD-10-CM

## 2021-06-10 PROCEDURE — 99214 OFFICE O/P EST MOD 30 MIN: CPT | Performed by: PSYCHIATRY & NEUROLOGY

## 2021-06-10 RX ORDER — FLUVOXAMINE MALEATE 25 MG
25 TABLET ORAL NIGHTLY
Qty: 90 TABLET | Refills: 1 | Status: SHIPPED | OUTPATIENT
Start: 2021-06-10 | End: 2021-07-15 | Stop reason: SDUPTHER

## 2021-06-10 RX ORDER — CLONAZEPAM 0.5 MG/1
TABLET ORAL
Qty: 45 TABLET | Refills: 1 | Status: SHIPPED | OUTPATIENT
Start: 2021-06-10 | End: 2021-07-15 | Stop reason: SDUPTHER

## 2021-06-10 NOTE — PROGRESS NOTES
Psych Follow Up Progress Note    6/10/21  Shaina Hernández  [id number]    I have reviewed recent documentation:  Shaina Hernández is a 80 y.o. female  This patient  has a past medical history of Aortic insufficiency, Breast cancer ([facility] [address] CAD (coronary artery disease), Heart attack ([facility] [address] HTN (hypertension), Hx of blood clots, and SVT (supraventricular tachycardia) ([facility] [address] Chief Complaint   Patient presents with    Anxiety     I called this patient today because of safety concerns regarding coronavirus. Subjective/Interval Hx:  Mushtaq Cheung is living c pt. Has a new job, and is working from home, and it's working out great! Feels luvox is somewhat helpful. And he stays on her about worrying about things. Pt is staying active! Doing a lot of knitting, alysa. Made some fidget blankets! Looking forward to going to [place] for the feast.  Will bring granddaughter Ary Ramirez. Doing some more driving! No nasty SE on meds. Location:  [facility]  Severity:  Mildish. Context:  As above. Modifiers:  meds somewhat helpful  Quality:  Anxiety. Objective:      No results found for this or any previous visit (from the past 168 hour(s)). Current Outpatient Medications   Medication Sig Dispense Refill    fluvoxaMINE (LUVOX) 25 MG tablet Take 1 tablet by mouth nightly 30 tablet 3    clonazePAM (KLONOPIN) 0.5 MG tablet Take 1/4 tab twice daily. 45 tablet 1    clonazePAM (KLONOPIN) 0.5 MG tablet Take 1/4 tablet twice daily. 45 tablet 2    albuterol sulfate HFA (VENTOLIN HFA) 108 (90 Base) MCG/ACT inhaler Inhale 2 puffs into the lungs 4 times daily for 5 days 1 Inhaler 0    gabapentin (NEURONTIN) 100 MG capsule Take 1 capsule by mouth 2 times daily for 30 days.  Intended supply: 30 days 60 capsule 1    atorvastatin (LIPITOR) 40 MG tablet Take 40 mg by mouth      TURMERIC PO Take by mouth daily      Misc Natural Products (TART CHERRY ADVANCED PO) Take by mouth daily      lisinopril (PRINIVIL;ZESTRIL) 10 MG tablet Take 1 tablet by mouth daily 30 tablet 3    amLODIPine (NORVASC) 10 MG tablet Take 10 mg by mouth daily      Omega-3 Fatty Acids (FISH OIL PO) Take 1 tablet by mouth daily      nitroGLYCERIN (NITROSTAT) 0.4 MG SL tablet Place 0.4 mg under the tongue every 5 minutes as needed for Chest pain Dissolve 1 tab under tongue at first sign of chest pain. May repeat every 5 minutes until relief is obtained. If pain persists after taking 3 tabs in a 15-minute period, or the pain is different than is typically experienced, call 9-1-1 immediately.  Multiple Vitamin (MULTI VITAMIN DAILY PO) Take by mouth Prodovite      Ascorbic Acid (VITAMIN C) 500 MG CAPS Take 1 tablet by mouth      Potassium Gluconate 550 MG TABS Take by mouth      Vitamin D (CHOLECALCIFEROL) 1000 UNITS CAPS capsule Take 1,000 Units by mouth daily      aspirin 81 MG tablet Take 81 mg by mouth daily. No current facility-administered medications for this visit. ROS:  No tremor, gait nl     MSE:  A-casually dressed, good EC, pleasant and engageable  A-pretty full! M-less depressed  S-grossly A + O  I/J-fair/fair  T-linear, goal-directed. Speech c nl r/t/v/a. No S/H I, no A/v H.       A/P 87 y/o F c      1. OCPD traits, anxiety NOS-We'll cont fluvoxamine 25 qhs.  Cont klonopin 0.25 bid prn.  Sees Cards  [id number].       2.  Memory trouble-Referred to [facility] center.       Has been on prozac, zoloft, effexor, klonopin, xanax, lexapro, paxil.     I have spent >15 mins with this patient on working on coping skills and med mgt, and > 1/2 of that time was spent counseling this pt.

## 2021-07-15 ENCOUNTER — OFFICE VISIT (OUTPATIENT)
Dept: PSYCHIATRY | Age: 86
End: 2021-07-15
Payer: MEDICARE

## 2021-07-15 VITALS
SYSTOLIC BLOOD PRESSURE: 150 MMHG | HEART RATE: 85 BPM | WEIGHT: 133 LBS | BODY MASS INDEX: 22.83 KG/M2 | DIASTOLIC BLOOD PRESSURE: 80 MMHG | OXYGEN SATURATION: 96 %

## 2021-07-15 DIAGNOSIS — F41.9 ANXIETY: ICD-10-CM

## 2021-07-15 PROCEDURE — 99443 PR PHYS/QHP TELEPHONE EVALUATION 21-30 MIN: CPT | Performed by: PSYCHIATRY & NEUROLOGY

## 2021-07-15 RX ORDER — CLONAZEPAM 0.5 MG/1
TABLET ORAL
Qty: 30 TABLET | Refills: 2 | Status: SHIPPED | OUTPATIENT
Start: 2021-07-15 | End: 2021-09-02 | Stop reason: SDUPTHER

## 2021-07-15 RX ORDER — FLUVOXAMINE MALEATE 50 MG/1
50 TABLET, COATED ORAL NIGHTLY
Qty: 90 TABLET | Refills: 1 | Status: SHIPPED | OUTPATIENT
Start: 2021-07-15 | End: 2021-10-28

## 2021-07-15 NOTE — PROGRESS NOTES
Psych Follow Up Progress Note    7/15/21  Earnest Norman  [id number]    I have reviewed recent documentation:  Earnest Norman is a 80 y.o. female  This patient  has a past medical history of Aortic insufficiency, Breast cancer ([facility] [address] CAD (coronary artery disease), Heart attack ([facility] [address] HTN (hypertension), Hx of blood clots, and SVT (supraventricular tachycardia) ([facility] [address] Chief Complaint   Patient presents with    Anxiety     I called pt for appt today because of safety concerns regarding Coronavirus. Subjective/Interval Hx:  Rut Turpin and pt are doing well. But Granddaughter Garcia moved in. She's in her 29's and is an artists, and likes to criticize pt to Javier Francois her way of thinking. \"  Moved sister Berto Monique into a NH closer to pt. Doesn't feel like fluvoxamine is doing much. Still c \"dizziness\" which family feels is all anxiety. Looking forward to going to the [place] in [place] with great-grandaughter Antionette Ribeiro in Sept.  Looking forward to [facility] coming up to visit for the weekend. Location:  [facility]  Severity:  Mild-mod  Context:  As above. Modifiers:  meds  Quality:  Anxiety. Objective:  Vitals:    07/15/21 1621   BP: (!) 150/80   Pulse: 85   SpO2: 96%   Weight: 133 lb (60.3 kg)        Body mass index is 22.83 kg/m². No results found for this or any previous visit (from the past 168 hour(s)). Current Outpatient Medications   Medication Sig Dispense Refill    clonazePAM (KLONOPIN) 0.5 MG tablet Take 1/4 tab twice daily. 45 tablet 1    fluvoxaMINE (LUVOX) 25 MG tablet Take 1 tablet by mouth nightly 90 tablet 1    albuterol sulfate HFA (VENTOLIN HFA) 108 (90 Base) MCG/ACT inhaler Inhale 2 puffs into the lungs 4 times daily for 5 days 1 Inhaler 0    gabapentin (NEURONTIN) 100 MG capsule Take 1 capsule by mouth 2 times daily for 30 days.  Intended supply: 30 days 60 capsule 1    atorvastatin (LIPITOR) 40 MG tablet Take 40 mg by mouth      TURMERIC PO Take by mouth daily      Misc Natural Products (TART CHERRY ADVANCED PO) Take by mouth daily      lisinopril (PRINIVIL;ZESTRIL) 10 MG tablet Take 1 tablet by mouth daily 30 tablet 3    amLODIPine (NORVASC) 10 MG tablet Take 10 mg by mouth daily      Omega-3 Fatty Acids (FISH OIL PO) Take 1 tablet by mouth daily      nitroGLYCERIN (NITROSTAT) 0.4 MG SL tablet Place 0.4 mg under the tongue every 5 minutes as needed for Chest pain Dissolve 1 tab under tongue at first sign of chest pain. May repeat every 5 minutes until relief is obtained. If pain persists after taking 3 tabs in a 15-minute period, or the pain is different than is typically experienced, call 9-1-1 immediately.  Multiple Vitamin (MULTI VITAMIN DAILY PO) Take by mouth Prodovite      Ascorbic Acid (VITAMIN C) 500 MG CAPS Take 1 tablet by mouth      Potassium Gluconate 550 MG TABS Take by mouth      Vitamin D (CHOLECALCIFEROL) 1000 UNITS CAPS capsule Take 1,000 Units by mouth daily      aspirin 81 MG tablet Take 81 mg by mouth daily. No current facility-administered medications for this visit. ROS:  No tremor, gait nl    MSE:    A-casually dressed, good eC, pleasant and engageable  A-full  M-euthymic to anxious  S-grossly A + O  I/J-fair/fair  T-linear, goal-directed. Speech c nl r/t/v/a. No S/H I, no a/vh. A/P 85 y/o F c      1. OCPD traits, anxiety NOS-We'll inc fluvoxamine 50 qhs.  Inc klonopin 0.25 tid prn.  Sees Cards  [id number].       2.  Memory trouble-Referred to [facility] d/o center.       Has been on prozac, zoloft, effexor, klonopin, xanax, lexapro, paxil.     I have spent >25 mins with this patient on working on coping skills and med mgt, and > 1/2 of that time was spent counseling this pt.

## 2021-09-02 ENCOUNTER — OFFICE VISIT (OUTPATIENT)
Dept: PSYCHIATRY | Age: 86
End: 2021-09-02
Payer: MEDICARE

## 2021-09-02 VITALS — WEIGHT: 133.6 LBS | HEIGHT: 65 IN | RESPIRATION RATE: 16 BRPM | BODY MASS INDEX: 22.26 KG/M2

## 2021-09-02 DIAGNOSIS — F32.A DEPRESSION, UNSPECIFIED DEPRESSION TYPE: ICD-10-CM

## 2021-09-02 DIAGNOSIS — F41.9 ANXIETY: Primary | ICD-10-CM

## 2021-09-02 DIAGNOSIS — R41.3 MEMORY CHANGES: ICD-10-CM

## 2021-09-02 PROCEDURE — 99214 OFFICE O/P EST MOD 30 MIN: CPT | Performed by: PSYCHIATRY & NEUROLOGY

## 2021-09-02 RX ORDER — CLONAZEPAM 0.5 MG/1
TABLET ORAL
Qty: 30 TABLET | Refills: 2 | Status: SHIPPED | OUTPATIENT
Start: 2021-09-02 | End: 2021-09-30 | Stop reason: SDUPTHER

## 2021-09-02 NOTE — PROGRESS NOTES
Dissolve 1 tab under tongue at first sign of chest pain. May repeat every 5 minutes until relief is obtained. If pain persists after taking 3 tabs in a 15-minute period, or the pain is different than is typically experienced, call 9-1-1 immediately.  Multiple Vitamin (MULTI VITAMIN DAILY PO) Take by mouth Prodovite      Ascorbic Acid (VITAMIN C) 500 MG CAPS Take 1 tablet by mouth      Potassium Gluconate 550 MG TABS Take by mouth      Vitamin D (CHOLECALCIFEROL) 1000 UNITS CAPS capsule Take 1,000 Units by mouth daily      aspirin 81 MG tablet Take 81 mg by mouth daily. No current facility-administered medications for this visit. ROS:  No tremor, gait nl    MSE:    A-casually dressed, good eC, pleasant and engageable  A-full  M-euthymic to anxious  S-grossly A + O  I/J-fair/fair  T-linear, goal-directed. Speech c nl r/t/v/a. No S/H I, no a/vh.       A/P 87 y/o F c      1. OCPD traits, anxiety NOS-We'll cont fluvoxamine 25 qd, which pt auto-decreased.  Inc klonopin 0.25 tid prn.  Sees Cards Dr. [id number].       2.  Memory trouble-Referred to [facility] d/o center.       Has been on prozac, zoloft, effexor, klonopin, xanax, lexapro, paxil.     I have spent >25 mins with this patient on working on coping skills and med mgt, and > 1/2 of that time was spent counseling this pt.

## 2021-09-30 ENCOUNTER — OFFICE VISIT (OUTPATIENT)
Dept: PSYCHIATRY | Age: 86
End: 2021-09-30
Payer: MEDICARE

## 2021-09-30 VITALS
BODY MASS INDEX: 22.3 KG/M2 | SYSTOLIC BLOOD PRESSURE: 138 MMHG | DIASTOLIC BLOOD PRESSURE: 71 MMHG | HEART RATE: 89 BPM | WEIGHT: 134 LBS

## 2021-09-30 DIAGNOSIS — F41.9 ANXIETY: Primary | ICD-10-CM

## 2021-09-30 DIAGNOSIS — F32.A DEPRESSION, UNSPECIFIED DEPRESSION TYPE: ICD-10-CM

## 2021-09-30 PROCEDURE — 99214 OFFICE O/P EST MOD 30 MIN: CPT | Performed by: PSYCHIATRY & NEUROLOGY

## 2021-09-30 RX ORDER — CLONAZEPAM 0.5 MG/1
TABLET ORAL
Qty: 30 TABLET | Refills: 2 | Status: SHIPPED | OUTPATIENT
Start: 2021-09-30 | End: 2021-11-30 | Stop reason: SDUPTHER

## 2021-09-30 NOTE — PROGRESS NOTES
Psych Follow Up Progress Note    9/30/21  Dayron Soriano  [id number]    I have reviewed recent documentation:  Dayron Soriano is a 80 y.o. female  This patient  has a past medical history of Aortic insufficiency, Breast cancer ([facility] CAD (coronary artery disease), [facility] HTN (hypertension), Hx of blood clots, and SVT (supraventricular tachycardia) ([facility] Chief Complaint   Patient presents with    Anxiety     Subjective/Interval Hx:  Feeling like she \"has OCD with what goes on in her body. \"  And we speak about pt's sister and how she was the black sheep so pt had to be the white sheep. And now she's fighting with son Storm Paz who keeps criticizing her. And this makes her sad. Location:  [facility]  Severity:  Mild-mod  Context:  As above. Modifiers:  meds only so helpful  Quality:  Anxiety. Objective:  Vitals:    09/30/21 1623   BP: 138/71   Pulse: 89   Weight: 134 lb (60.8 kg)   PF: 98 L/min        Body mass index is 22.3 kg/m². No results found for this or any previous visit (from the past 168 hour(s)). Current Outpatient Medications   Medication Sig Dispense Refill    clonazePAM (KLONOPIN) 0.5 MG tablet Take 1/4 tab three times daily. 30 tablet 2    fluvoxaMINE (LUVOX) 50 MG tablet Take 1 tablet by mouth nightly 90 tablet 1    albuterol sulfate HFA (VENTOLIN HFA) 108 (90 Base) MCG/ACT inhaler Inhale 2 puffs into the lungs 4 times daily for 5 days 1 Inhaler 0    gabapentin (NEURONTIN) 100 MG capsule Take 1 capsule by mouth 2 times daily for 30 days.  Intended supply: 30 days 60 capsule 1    atorvastatin (LIPITOR) 40 MG tablet Take 40 mg by mouth      TURMERIC PO Take by mouth daily      Misc Natural Products (TART CHERRY ADVANCED PO) Take by mouth daily      lisinopril (PRINIVIL;ZESTRIL) 10 MG tablet Take 1 tablet by mouth daily 30 tablet 3    amLODIPine (NORVASC) 10 MG tablet Take 10 mg by mouth daily      Omega-3 Fatty Acids (FISH OIL PO) Take 1 tablet by mouth daily  nitroGLYCERIN (NITROSTAT) 0.4 MG SL tablet Place 0.4 mg under the tongue every 5 minutes as needed for Chest pain Dissolve 1 tab under tongue at first sign of chest pain. May repeat every 5 minutes until relief is obtained. If pain persists after taking 3 tabs in a 15-minute period, or the pain is different than is typically experienced, call 9-1-1 immediately.  Multiple Vitamin (MULTI VITAMIN DAILY PO) Take by mouth Prodovite      Ascorbic Acid (VITAMIN C) 500 MG CAPS Take 1 tablet by mouth      Potassium Gluconate 550 MG TABS Take by mouth      Vitamin D (CHOLECALCIFEROL) 1000 UNITS CAPS capsule Take 1,000 Units by mouth daily      aspirin 81 MG tablet Take 81 mg by mouth daily. No current facility-administered medications for this visit. ROS:  No tremor, gait nl    MSE:    A-casually dressed, good eC, pleasant and engageable  A-a bit sad  M-a bit sad to anxious  S-grossly A + O  I/J-fair/fair  T-linear, goal-directed.  Speech c nl r/t/v/a.  No S/H I, no a/vh.       A/P 85 y/o F c      1. OCPD traits, anxiety NOS-We'll cont fluvoxamine 50. Inc klonopin 0.25 tid prn.  Sees Cards Dr. [id number].        2.  Memory trouble-[place] [facility] [place]       Has been on prozac, zoloft, effexor, klonopin, xanax, lexapro, paxil.     I have spent >25 mins with this patient on working on coping skills and med mgt, and > 1/2 of that time was spent counseling this pt.

## 2021-10-28 ENCOUNTER — OFFICE VISIT (OUTPATIENT)
Dept: PSYCHIATRY | Age: 86
End: 2021-10-28
Payer: MEDICARE

## 2021-10-28 VITALS — WEIGHT: 133 LBS | HEIGHT: 65 IN | BODY MASS INDEX: 22.16 KG/M2 | RESPIRATION RATE: 16 BRPM

## 2021-10-28 DIAGNOSIS — F32.A DEPRESSION, UNSPECIFIED DEPRESSION TYPE: ICD-10-CM

## 2021-10-28 DIAGNOSIS — F41.9 ANXIETY: Primary | ICD-10-CM

## 2021-10-28 PROCEDURE — 99214 OFFICE O/P EST MOD 30 MIN: CPT | Performed by: PSYCHIATRY & NEUROLOGY

## 2021-10-28 NOTE — PROGRESS NOTES
Psych Follow Up Progress Note    10/28/21  Melody Bland  [id number]    I have reviewed recent documentation:  Melody Bland is a 80 y.o. female  This patient  has a past medical history of Aortic insufficiency, Breast cancer ([facility] [address] [facility] CAD (coronary artery disease), Heart attack ([facility] [address] HTN (hypertension), Hx of blood clots, and SVT (supraventricular tachycardia) ([facility] Chief Complaint   Patient presents with    Anxiety       Subjective/Interval Hx: Wonders if she has OCD, since granddaughter thinks she has OCD and has been telling therapist Leela Hurd that pt has OCD. And sometimes Edwin's communication is unkind. And everyone has their plan to help pt, and sometimes she doesn't want it. Just loves family, fellowship, a little alysa, friends. Location:  [facility]  Severity:  mildish  Context:  As above. Modifiers:  meds  Quality:  Anxiety. Objective:    Vitals:    10/28/21 1247   Resp: 16   Weight: 133 lb (60.3 kg)   Height: 5' 5\" (1.651 m)       No results found for this or any previous visit (from the past 168 hour(s)). Current Outpatient Medications   Medication Sig Dispense Refill    clonazePAM (KLONOPIN) 0.5 MG tablet Take 1/4 tab three times daily. 30 tablet 2    fluvoxaMINE (LUVOX) 50 MG tablet Take 1 tablet by mouth nightly 90 tablet 1    albuterol sulfate HFA (VENTOLIN HFA) 108 (90 Base) MCG/ACT inhaler Inhale 2 puffs into the lungs 4 times daily for 5 days 1 Inhaler 0    gabapentin (NEURONTIN) 100 MG capsule Take 1 capsule by mouth 2 times daily for 30 days.  Intended supply: 30 days 60 capsule 1    atorvastatin (LIPITOR) 40 MG tablet Take 40 mg by mouth      TURMERIC PO Take by mouth daily      Misc Natural Products (TART CHERRY ADVANCED PO) Take by mouth daily      lisinopril (PRINIVIL;ZESTRIL) 10 MG tablet Take 1 tablet by mouth daily 30 tablet 3    amLODIPine (NORVASC) 10 MG tablet Take 10 mg by mouth daily      Omega-3 Fatty Acids (FISH OIL PO) Take 1 tablet by mouth daily  nitroGLYCERIN (NITROSTAT) 0.4 MG SL tablet Place 0.4 mg under the tongue every 5 minutes as needed for Chest pain Dissolve 1 tab under tongue at first sign of chest pain. May repeat every 5 minutes until relief is obtained. If pain persists after taking 3 tabs in a 15-minute period, or the pain is different than is typically experienced, call 9-1-1 immediately.  Multiple Vitamin (MULTI VITAMIN DAILY PO) Take by mouth Prodovite      Ascorbic Acid (VITAMIN C) 500 MG CAPS Take 1 tablet by mouth      Potassium Gluconate 550 MG TABS Take by mouth      Vitamin D (CHOLECALCIFEROL) 1000 UNITS CAPS capsule Take 1,000 Units by mouth daily      aspirin 81 MG tablet Take 81 mg by mouth daily. No current facility-administered medications for this visit. ROS:  No tremor, gait nl    MSE:    A-casually dressed, good eC, pleasant and engageable  A-more full today. M-a little better. S-grossly A + O  I/J-fair/fair  T-linear, goal-directed.  Speech c nl r/t/v/a.  No S/H I, no a/vh.       A/P .88 y/o F c      1. OCPD traits, anxiety NOS-Stopped fluvoxamine.  Inc klonopin 0.25 tid prn.  Sees Cards  [id number].   Sees a therapist Anne Latimer for anxiety centered around body. Or at least was.     2.  Memory trouble-Referred to [facility] d/o center.       Has been on prozac, zoloft, effexor, klonopin, xanax, lexapro, paxil.      I have spent >25 mins with this patient on working on coping skills and med mgt, and > 1/2 of that time was spent counseling this pt.

## 2021-11-30 ENCOUNTER — OFFICE VISIT (OUTPATIENT)
Dept: PSYCHIATRY | Age: 86
End: 2021-11-30
Payer: MEDICARE

## 2021-11-30 VITALS
BODY MASS INDEX: 21.8 KG/M2 | OXYGEN SATURATION: 98 % | HEART RATE: 89 BPM | SYSTOLIC BLOOD PRESSURE: 138 MMHG | DIASTOLIC BLOOD PRESSURE: 80 MMHG | WEIGHT: 131 LBS

## 2021-11-30 DIAGNOSIS — F32.A DEPRESSION, UNSPECIFIED DEPRESSION TYPE: Primary | ICD-10-CM

## 2021-11-30 DIAGNOSIS — F41.9 ANXIETY: ICD-10-CM

## 2021-11-30 PROCEDURE — 99213 OFFICE O/P EST LOW 20 MIN: CPT | Performed by: PSYCHIATRY & NEUROLOGY

## 2021-11-30 RX ORDER — CLONAZEPAM 0.5 MG/1
TABLET ORAL
Qty: 30 TABLET | Refills: 2 | Status: SHIPPED | OUTPATIENT
Start: 2021-11-30 | End: 2022-01-13 | Stop reason: SDUPTHER

## 2021-11-30 NOTE — PROGRESS NOTES
Psych Follow Up Progress Note    21  Konstantin [place]  [id number]    I have reviewed recent documentation:  Konstantin Birmingahm is a 80 y.o. female  This patient  has a past medical history of Aortic insufficiency, Breast cancer ([facility] CAD (coronary artery disease), Heart attack ([facility] HTN (hypertension), Hx of blood clots, and SVT (supraventricular tachycardia) ([facility] Chief Complaint   Patient presents with    Anxiety       Subjective/Interval Hx: It all started c Bruce's dad , he was a well-known . And so it was a big thing. And Helio's b-day happened, and a couple of the grandkids' birthdays happened. And Gaston Uriostegui is unhappy about living c pt in the basement. And he was leaving and pt was asking where he's going, and it blew up. And pt locked the doors so he'd find the doors locked. And it was enormously painful for both of them. But maybe good? Lots of learning. Location:  Mind  Severity:  mild  Context:  As above. Modifiers:  meds only so helpful. Quality:  anxiety    Objective:  Vitals:    21 1234   BP: 138/80   Pulse: 89   SpO2: 98%   Weight: 131 lb (59.4 kg)        Body mass index is 21.8 kg/m². No results found for this or any previous visit (from the past 168 hour(s)). Current Outpatient Medications   Medication Sig Dispense Refill    clonazePAM (KLONOPIN) 0.5 MG tablet Take 1/4 tab three times daily. 30 tablet 2    albuterol sulfate HFA (VENTOLIN HFA) 108 (90 Base) MCG/ACT inhaler Inhale 2 puffs into the lungs 4 times daily for 5 days 1 Inhaler 0    gabapentin (NEURONTIN) 100 MG capsule Take 1 capsule by mouth 2 times daily for 30 days.  Intended supply: 30 days 60 capsule 1    atorvastatin (LIPITOR) 40 MG tablet Take 40 mg by mouth      TURMERIC PO Take by mouth daily      Misc Natural Products (TART CHERRY ADVANCED PO) Take by mouth daily      lisinopril (PRINIVIL;ZESTRIL) 10 MG tablet Take 1 tablet by mouth daily 30 tablet 3    amLODIPine (NORVASC) 10 MG tablet Take 10 mg by mouth daily      Omega-3 Fatty Acids (FISH OIL PO) Take 1 tablet by mouth daily      nitroGLYCERIN (NITROSTAT) 0.4 MG SL tablet Place 0.4 mg under the tongue every 5 minutes as needed for Chest pain Dissolve 1 tab under tongue at first sign of chest pain. May repeat every 5 minutes until relief is obtained. If pain persists after taking 3 tabs in a 15-minute period, or the pain is different than is typically experienced, call 9-1-1 immediately.  Multiple Vitamin (MULTI VITAMIN DAILY PO) Take by mouth Prodovite      Ascorbic Acid (VITAMIN C) 500 MG CAPS Take 1 tablet by mouth      Potassium Gluconate 550 MG TABS Take by mouth      Vitamin D (CHOLECALCIFEROL) 1000 UNITS CAPS capsule Take 1,000 Units by mouth daily      aspirin 81 MG tablet Take 81 mg by mouth daily. No current facility-administered medications for this visit. ROS:  No tremor, gait nl    MSE:    A-casually dressed, good eC, pleasant and engageable  A-full  M-better now that the fight is over. S-grossly A + O  I/J-fair/fair  T-linear, goal-directed.  Speech c nl r/t/v/a.  No S/H I, no a/vh.       Controlled Substance Monitoring:    Acute and Chronic Pain Monitoring:   RX Monitoring 11/30/2021   Attestation -   Periodic Controlled Substance Monitoring No signs of potential drug abuse or diversion identified. A/P 86 y/o F c      1. OCPD traits, anxiety NOS-Since klonopin 0.25 tid prn.  Sees Cards  [id number].   Sees a therapist Becca Lindo for anxiety centered around body. Or at least was.     2.  Memory trouble-Referred to [facility] d/o [facility].       Has been on prozac, zoloft, effexor, klonopin, xanax, lexapro, paxil.     I have spent >25 mins with this patient on working on coping skills and med mgt, and > 1/2 of that time was spent counseling this pt.

## 2021-12-06 DIAGNOSIS — H91.90 HEARING LOSS, UNSPECIFIED HEARING LOSS TYPE, UNSPECIFIED LATERALITY: Primary | ICD-10-CM

## 2021-12-13 NOTE — PROGRESS NOTES
Mary Way   1934, 80 y.o. female   [id number]       Referring Provider: Abhilash Long MD  Referral Type: In an order in [address] [place] [address]    Reason for Visit: Evaluation of suspected change in hearing, tinnitus, or balance. ADULT AUDIOLOGIC EVALUATION      Mary Way is a 80 y.o. female seen today, 12/14/2021 , for an initial audiologic evaluation. Patient was seen by Abhilash Long MD following today's evaluation. AUDIOLOGIC AND OTHER PERTINENT MEDICAL HISTORY:      Mary Way noted tinnitus, dizziness and imbalance. Patient reports episodes of dizziness which she describes as a general unsteadiness that is constant in nature. This began in 2018 following cataract surgey. Patient reports bilateral tinnitus that is intermittent in nature. Patient has hearing aids from Acute [facility]. Mary Way denied otalgia, aural fullness, otorrhea, history of falls, history of significant noise exposure, history of head trauma, history of ear surgery and family history of hearing loss. Date: 12/14/2021     IMPRESSIONS:      AU: Hearing WNL sloping to Moderately-Severe SNHL, Excellent WRS, Type A tymp    Test results consistent with bilateral Sensorineural hearing loss. Hearing loss significant enough to create hearing difficulty in some listening situations. Discussed hearing loss, tinnitus, hearing aids and dizziness with patient. Patient to follow medical recommendations per Abhilash Long MD .    ASSESSMENT AND FINDINGS:     Otoscopy revealed: Clear ear canals bilaterally    RIGHT EAR:  Hearing Sensitivity: Normal hearing sensitivity to Moderately-Severe Sensorineural hearing loss  Speech Recognition Threshold: 35 dB HL  Word Recognition:Excellent (100%), based on NU-6 25-word list at 65 dBHL using recorded speech stimuli. Tympanometry: Normal peak pressure and compliance, Type A tympanogram, consistent with normal middle ear function.   Acoustic Reflexes: Ipsilateral: Could not maintain seal. Contralateral: Could not maintain seal.    LEFT EAR:  Hearing Sensitivity: Normal hearing sensitivity to Moderately-Severe Sensorineural hearing loss  Speech Recognition Threshold: 40 dB HL  Word Recognition:Excellent (96%), based on NU-6 25-word list at 65 dBHL using recorded speech stimuli. Tympanometry: Normal peak pressure and compliance, Type A tympanogram, consistent with normal middle ear function. Acoustic Reflexes: Ipsilateral: Could not maintain seal. Contralateral: Could not maintain seal.    Reliability: Good  Transducer: Inserts    See scanned audiogram dated 12/14/2021  for results. PATIENT EDUCATION:     The following items were discussed with the patient:   - Good Communication Strategies  - Hearing Loss and Hearing Aids  - Tinnitus Management Strategies    - Fall Risk and Prevention   - Dizziness    Educational information was shared in the After Visit Summary. RECOMMENDATIONS:                                                                                                                                                                                                                                                          The following items are recommended based on patient report and results from today's appointment:   - Continue medical follow-up with Rolando Porras MD.   - Retest hearing as medically indicated and/or sooner if a change in hearing is noted. - Continue hearing aid use and follow-up with dispensing audiologist as needed. - Utilize \"Good Communication Strategies\" as discussed to assist in speech understanding with communication partners. - Maintain a sound enriched environment to assist in the management of tinnitus symptoms.  - Use hearing protection devices (HPDs), such as protective ear muffs and ear plugs, when exposed to dangerous sound levels.   - If medically indicated, consider vestibular evaluation to further investigate symptoms of dizziness.        Sameer Garcia  Audiologist    Chart CC'd to: Dakotah Naylor MD      Degree of   Hearing Sensitivity dB Range   Within Normal Limits (WNL) 0 - 20   Mild 20 - 40   Moderate 40 - 55   Moderately-Severe 55 - 70   Severe 70 - 90   Profound 90 +

## 2021-12-14 ENCOUNTER — PROCEDURE VISIT (OUTPATIENT)
Dept: AUDIOLOGY | Age: 86
End: 2021-12-14
Payer: MEDICARE

## 2021-12-14 ENCOUNTER — OFFICE VISIT (OUTPATIENT)
Dept: ENT CLINIC | Age: 86
End: 2021-12-14
Payer: MEDICARE

## 2021-12-14 VITALS — HEIGHT: 65 IN | WEIGHT: 130 LBS | BODY MASS INDEX: 21.66 KG/M2

## 2021-12-14 DIAGNOSIS — H90.3 SENSORINEURAL HEARING LOSS (SNHL) OF BOTH EARS: Primary | ICD-10-CM

## 2021-12-14 DIAGNOSIS — R42 DIZZINESS: ICD-10-CM

## 2021-12-14 DIAGNOSIS — G43.809 VESTIBULAR MIGRAINE: ICD-10-CM

## 2021-12-14 DIAGNOSIS — H93.13 TINNITUS OF BOTH EARS: ICD-10-CM

## 2021-12-14 DIAGNOSIS — R42 DYSEQUILIBRIUM: ICD-10-CM

## 2021-12-14 PROCEDURE — 99203 OFFICE O/P NEW LOW 30 MIN: CPT | Performed by: STUDENT IN AN ORGANIZED HEALTH CARE EDUCATION/TRAINING PROGRAM

## 2021-12-14 PROCEDURE — 92557 COMPREHENSIVE HEARING TEST: CPT | Performed by: AUDIOLOGIST

## 2021-12-14 PROCEDURE — 92567 TYMPANOMETRY: CPT | Performed by: AUDIOLOGIST

## 2021-12-14 NOTE — PATIENT INSTRUCTIONS
Good Communication Strategies    Communication can be challenging for anyone, but can be especially difficult for those with some degree of hearing loss. While we may not be able to control every factor that may lead to difficulty with communication, there are Good Communication Strategies that we can all use in our day-to-day lives. Communication takes both parties working together for it to be successful. Tips as a Listener:   1. Control your environment. It is important to limit the amount of background noise in the room when possible. You should also consider having a good light source in the room to best see the other person. 2. Ask for clarification. Instead of saying \"What?\", you can use parts of what you heard to make a new question. For example, if you heard the word \"Thursday\" but not the rest of the week, you may ask \"What was that about Thursday? \" or \"What did you want to do Thursday? \". This shows the person talking that you are listening and will help them better explain what they are saying. 3. Be an advocate for yourself. If you are hearing but not understanding, tell the other person \"I can hear you, but I need you to slow down when you speak. \"  Or if someone is facing the other direction, say \"I cannot hear you when you are not looking at me when we talk. \"       Tips as a Talker:   - Sit or stand 3 to 6 feet away to maximize audibility         -- It is unrealistic to believe someone else will fully hear your message if you are speaking from across the room or in a different room in the house   - Stay at eye level to help with visual cues   - Make sure you have the persons attention before speaking   - Use facial expressions and gestures to accentuate your message   - Raise your voice slightly (do not scream)   - Speak slowly and distinctly   - Use short, simple sentences   - Rephrase your words if the person is having a hard time understanding you    - To avoid distortion, dont speak directly into a persons ear      Some additional items that may be helpful:   - Remain patient - this is important for both parties   - Write down items that still cannot be heard/understood. You may write with pen/paper or consider typing/texting on a cell phone or smart device. - If background noise is unavoidable, try to keep yourself in a good position in the room. By sitting at a [place] on the side of the restaurant (preferably a corner), it will be easier to communicate than if you were sitting at a table in the middle with background noise surrounding you. Keep yourself positioned away from music speakers or heavy foot traffic. Hearing Loss: Care Instructions  Your Care Instructions      Hearing loss is a sudden or slow decrease in how well you hear. It can range from mild to profound. Permanent hearing loss can occur with aging, and it can happen when you are exposed long-term to loud noise. Examples include listening to loud music, riding motorcycles, or being around other loud machines. Hearing loss can affect your work and home life. It can make you feel lonely or depressed. You may feel that you have lost your independence. But hearing aids and other devices can help you hear better and feel connected to others. Follow-up care is a key part of your treatment and safety. Be sure to make and go to all appointments, and call your doctor if you are having problems. It's also a good idea to know your test results and keep a list of the medicines you take. How can you care for yourself at home? · Avoid loud noises whenever possible. This helps keep your hearing from getting worse. Always wear hearing protection around loud noises. · If appropriate, wear hearing aid(s) as directed. It is recommended that hearing aids are worn during all waking hours to keep your brain active and give it access to the sounds it is missing.       · If you are beginning your process with hearing aid(s), schedule a \"Hearing Aid Evaluation\" with an audiologist to discuss your lifestyle, features of hearing aid technology, and styles of hearing aids available. It is recommended that you contact your insurance company to determine if you have a hearing aid benefit, as this may dictate who you can see for these services. · Have hearing tests as your doctor suggests. They can show whether your hearing has changed. Your hearing aid may need to be adjusted. · Use other assistive devices as needed. These may include:  ? Telephone amplifiers and hearing aids that can connect to a television, stereo, radio, or microphone. ? Devices that use lights or vibrations. These alert you to the doorbell, a ringing telephone, or a baby monitor. ? Television closed-captioning. This shows the words at the bottom of the screen. Most new TVs can do this. ? TTY (text telephone). This lets you type messages back and forth on the telephone instead of talking or listening. These devices are also called TDD. When messages are typed on the keyboard, they are sent over the phone line to a receiving TTY. The message is shown on a monitor. · Use pagers, fax machines, text, and email if it is hard for you to communicate by telephone. · Try to learn a listening technique called speech-reading. It is not lip-reading. You pay attention to people's gestures, expressions, posture, and tone of voice. These clues can help you understand what a person is saying. Face the person you are talking to, and have him or her face you. Make sure the lighting is good. You need to see the other person's face clearly. · Think about counseling if you need help to adjust to your hearing loss. When should you call for help? Watch closely for changes in your health, and be sure to contact your doctor if:    · You think your hearing is getting worse. · You have new symptoms, such as dizziness or nausea.            Tinnitus: Overview and Management Strategies          Many people have some ringing sounds in their ears once in a while. You may hear a roar, a hiss, a tinkle, or a buzz. The sound usually lasts only a few minutes. If it goes on all the time, you may have tinnitus. Tinnitus is usually caused by long-term exposure to loud noise. This damages the nerves in the inner ear. It can occur with all types of hearing loss. It may be a symptom of almost any ear problem. Tinnitus may be caused by a buildup of earwax. Or, it may be caused by ear infections or certain medicines (especially antibiotics or large amounts of aspirin). You can also hear noises in your ears because of an injury to the ears, drinking too much alcohol or caffeine, or a medical condition. Other conditions may also contribute to tinnitus, including: head and neck trauma, temporomandibular joint disorder (TMJ), sinus pressure and barometric trauma, traumatic brain injury, metabolic disorders, autoimmune disorders, stress, and high blood pressure. You may need tests to evaluate your hearing and to find causes of long-lasting tinnitus. Your doctor may suggest one or more treatments to help you cope with the tinnitus. You can also do things at home to help reduce symptoms. Causes of Tinnitus  We do not know the exact cause of tinnitus. One thing we do know is that you are not imagining it. If you have tinnitus, chances are the cause will remain a mystery. Conditions that might cause tinnitus include the following:    Hearing loss    Ménière's disease    Loud noise exposure    Migraines    Head injury    Drugs or medicines that are toxic to hearing    Anemia    High blood pressure    Stress    A lot of wax in the ear    Certain types of tumors    Having a lot of caffeine    Smoking cigarettes  You may find that your tinnitus is worse at night. This happens because it is quiet and you are not distracted.  Feeling tired and stressed may make your tinnitus worse.    Follow-up care is a key part of your treatment and safety. Be sure to make and go to all appointments, and call your doctor if you are having problems. It's also a good idea to know your test results and keep a list of the medicines you take. How can you care for yourself at home? · Limit or cut out alcohol, caffeine, and sodium. They can make your symptoms worse. · Do not smoke or use other tobacco products. Nicotine reduces blood flow to the ear and makes tinnitus worse. If you need help quitting, talk to your doctor about stop-smoking programs and medicines. These can increase your chances of quitting for good. · Talk to your doctor about whether to stop taking aspirin and similar products such as ibuprofen or naproxen. · Get exercise often. It can help improve blood flow to the ear. Hearing Aids and Other Devices  A hearing aid may help your tinnitus if you have a hearing loss. An audiologist can help you find and use the best hearing aid for you. Tinnitus maskers look like hearing aids. They make a sound that masks, or covers up, the tinnitus. The masking sound distracts you from the ringing in your ears. You may be able to use a masker and a hearing aid at the same time. Sound machines can be useful at night or during quiet times. There are machines you can buy at the store. Or, you can find apps on your phone that make sounds, like the ocean or rainfall. Fish tanks, fans, quiet music, and indoor waterfalls can help, as well. Ways to manage/cope with tinnitus  Some tinnitus may last a long time. To manage your tinnitus, try to:  · Avoid noises that you think caused your tinnitus. If you can't avoid loud noises, wear earplugs or earmuffs. · Ignore the sound by paying attention to other things. Keeping your brain busy with other tasks or background noise can help your brain not focus on the tinnitus. · Try to not give the tinnitus an emotional reaction.   Do your best to ignore the sound and not let it bother you. Relax using biofeedback, meditation, or yoga. Feeling stressed and being tired can make tinnitus worse. · Play music or white noise to help you sleep. Background noise may cover up the noise that you hear in your ears. You can buy a tabletop machine or a device that sits under your pillow to play soothing sounds, like ocean waves. · Smart phones have free apps, such as Whist, Relax Melodies, ReSound Relief, and White Noise Lite. These apps have different types of sounds/noise, some of which you can blend together to find sounds that are most soothing to you. · Hearing aid technology, especially when there is some hearing loss, may help reduce tinnitus symptoms by giving your brain better access to the sounds it is missing. There are some hearing aids with built-in noise generator programs, which may help when amplification alone is not enough. Additional resources may be found through the American Tinnitus Association at www.ginny.org    When should you call for help? Call 911 anytime you think you may need emergency care. For example, call if:    · You have symptoms of a stroke. These may include:  ? Sudden numbness, tingling, weakness, or loss of movement in your face, arm, or leg, especially on only one side of your body. ? Sudden vision changes. ? Sudden trouble speaking. ? Sudden confusion or trouble understanding simple statements. ? Sudden problems with walking or balance. ? A sudden, severe headache that is different from past headaches. Call your doctor now or seek immediate medical care if:    · You develop other symptoms. These may include hearing loss (or worse hearing loss), balance problems, dizziness, nausea, or vomiting. Watch closely for changes in your health, and be sure to contact your doctor if:    · Your tinnitus moves from both ears to one ear. · Your hearing loss gets worse within 1 day after an ear injury.      · Your tinnitus or hearing loss does not get better within 1 week after an ear injury. · Your tinnitus bothers you enough that you want to take medicines to help you cope with it. If you notice changes in your tinnitus and/or your hearing, it is recommended that you have your hearing tested by your audiologist and to follow-up with your physician that manages your hearing loss (such as your ENT or Primary Care doctor). Learning About Hearing Aids  What is a hearing aid? A hearing aid makes sounds louder. It can help some people with hearing problems to hear better. Hearing aids do not restore normal hearing. But they can make it easier to communicate by making sounds clearer. · Digital programmable hearing aids can adjust themselves to work best where you are at any time. You also have more choices in setting them up than with analog hearing aids. There are also different styles of hearing aids. · A behind-the-ear (BTE) hearing aid connects to a plastic ear mold that fits inside the outer ear. BTE hearing aids are used for all levels of hearing loss, especially very severe hearing loss. They may be better for children for safety and growth reasons. Poorly fitting BTE ear molds or a buildup of earwax may cause a whistling sound (feedback). · An in-the-ear (ITE) hearing aid fits in the outer part of the ear. It can be used by people with mild to severe hearing loss. ITE hearing aids can be used with other hearing devices, such as a telecoil that improves hearing during phone calls. ITE hearing aids can be damaged by earwax and fluid draining from the ear. Their small size may be hard for some people to handle. They are not often used in children because the case must be replaced as the child grows. · An in-the-canal (ITC) hearing aid fits into the ear canal. ITC hearing aids are used by people with mild to moderate hearing loss.  They are made to fit the shape and the size of your ear canal. They can be damaged by earwax and fluid draining from the ear. Their small size may be hard for some people to handle. They are not made for children. You have some options if you have a hearing problem and are thinking about getting hearing aids. You can go to your doctor or an audiologist. He or she will do a hearing test and help you decide which type and style of hearing aid may be best for you. What else should I know about hearing aids? Find out if your insurance covers hearing aids. They can be expensive. Different types of hearing aids come with different costs. Also find out about a warranty or return policy in case you are not happy with your hearing aids. Follow-up care is a key part of your treatment and safety. Be sure to make and go to all appointments, and call your doctor if you are having problems. It's also a good idea to know your test results and keep a list of the medicines you take. Where can you learn more? Go to https://Lernstift.PureVideo Networks. org and sign in to your Remerge account. Enter O465 in the Future Fleet box to learn more about \"Learning About Hearing Aids. \"     If you do not have an account, please click on the \"Sign Up Now\" link. Current as of: October 21, 2018  Content Version: 12.1  © 9727-0994 Healthwise, Incorporated. Care instructions adapted under license by [facility] ([facility]). If you have questions about a medical condition or this instruction, always ask your healthcare professional. [place] [address] any warranty or liability for your use of this information. Dizziness: Care Instructions  Your Care Instructions  Dizziness is the feeling of unsteadiness or fuzziness in your head. It is different than having vertigo, which is a feeling that the room is spinning or that you are moving or falling. It is also different from lightheadedness, which is the feeling that you are about to faint. It can be hard to know what causes dizziness.  Some people feel dizzy when they have migraine headaches. Sometimes bouts of flu can make you feel dizzy. Some medical conditions, such as heart problems or high blood pressure, can make you feel dizzy. Many medicines can cause dizziness, including medicines for high blood pressure, pain, or anxiety. If a medicine causes your symptoms, your doctor may recommend that you stop or change the medicine. If it is a problem with your heart, you may need medicine to help your heart work better. If there is no clear reason for your symptoms, your doctor may suggest watching and waiting for a while to see if the dizziness goes away on its own. Follow-up care is a key part of your treatment and safety. Be sure to make and go to all appointments, and call your doctor if you are having problems. It's also a good idea to know your test results and keep a list of the medicines you take. How can you care for yourself at home? · If your doctor recommends or prescribes medicine, take it exactly as directed. Call your doctor if you think you are having a problem with your medicine. · Do not drive while you feel dizzy. · Try to prevent falls. Steps you can take include:  ? Using nonskid mats, adding grab bars near the tub, and using night-lights. ? Clearing your home so that walkways are free of anything you might trip on.  ? Letting family and friends know that you have been feeling dizzy. This will help them know how to help you. When should you call for help? Call 911 anytime you think you may need emergency care. For example, call if:    · You passed out (lost consciousness). · You have dizziness along with symptoms of a heart attack. These may include:  ? Chest pain or pressure, or a strange feeling in the chest.  ? Sweating. ? Shortness of breath. ? Nausea or vomiting. ? Pain, pressure, or a strange feeling in the back, neck, jaw, or upper belly or in one or both shoulders or arms.   ? Lightheadedness or sudden weakness. ? A fast or irregular heartbeat. · You have symptoms of a stroke. These may include:  ? Sudden numbness, tingling, weakness, or loss of movement in your face, arm, or leg, especially on only one side of your body. ? Sudden vision changes. ? Sudden trouble speaking. ? Sudden confusion or trouble understanding simple statements. ? Sudden problems with walking or balance. ? A sudden, severe headache that is different from past headaches. Call your doctor now or seek immediate medical care if:    · You feel dizzy and have a fever, headache, or ringing in your ears. · You have new or increased nausea and vomiting. · Your dizziness does not go away or comes back. Watch closely for changes in your health, and be sure to contact your doctor if:    · You do not get better as expected. Where can you learn more? Go to https://3D HubspeEndavo Media and Communicationseb.PortAuthority Technologies. org and sign in to your ActivNetworks account. Enter M423 in the Fundbox box to learn more about \"Dizziness: Care Instructions. \"     If you do not have an account, please click on the \"Sign Up Now\" link. Current as of: September 23, 2018  Content Version: 11.9  © 9355-0075 [employer], PDV. Care instructions adapted under license by [facility] ([facility]). If you have questions about a medical condition or this instruction, always ask your healthcare professional. [place] [address] any warranty or liability for your use of this information. Patient Education        Preventing Falls: Care Instructions  Your Care Instructions     Getting around your home safely can be a challenge if you have injuries or health problems that make it easy for you to fall. Loose rugs and furniture in walkways are among the dangers for many older people who have problems walking or who have poor eyesight. People who have conditions such as arthritis, osteoporosis, or dementia also have to be careful not to fall.   You can make your home safer with a few simple measures. Follow-up care is a key part of your treatment and safety. Be sure to make and go to all appointments, and call your doctor if you are having problems. It's also a good idea to know your test results and keep a list of the medicines you take. How can you care for yourself at home? Taking care of yourself  · You may get dizzy if you do not drink enough water. To prevent dehydration, drink plenty of fluids. Choose water and other clear liquids. If you have kidney, heart, or liver disease and have to limit fluids, talk with your doctor before you increase the amount of fluids you drink. · Exercise regularly to improve your strength, muscle tone, and balance. Walk if you can. Swimming may be a good choice if you cannot walk easily. · Have your vision and hearing checked each year or any time you notice a change. If you have trouble seeing and hearing, you might not be able to avoid objects and could lose your balance. · Know the side effects of the medicines you take. Ask your doctor or pharmacist whether the medicines you take can affect your balance. Sleeping pills or sedatives can affect your balance. · Limit the amount of alcohol you drink. Alcohol can impair your balance and other senses. · Ask your doctor whether calluses or corns on your feet need to be removed. If you wear loose-fitting shoes because of calluses or corns, you can lose your balance and fall. · Talk to your doctor if you have numbness in your feet. Preventing falls at home  · Remove raised doorway thresholds, throw rugs, and clutter. Repair loose carpet or raised areas in the floor. · Move furniture and electrical cords to keep them out of walking paths. · Use nonskid floor wax, and wipe up spills right away, especially on ceramic tile floors. · If you use a walker or cane, put rubber tips on it.  If you use crutches, clean the bottoms of them regularly with an abrasive pad, such as steel Falls: Care Instructions. \"     If you do not have an account, please click on the \"Sign Up Now\" link. Current as of: December 7, 2020               Content Version: 13.0  © 2006-2021 Healthwise, Incorporated. Care instructions adapted under license by [facility] ([facility]). If you have questions about a medical condition or this instruction, always ask your healthcare professional. [address] any warranty or liability for your use of this information.

## 2021-12-14 NOTE — Clinical Note
Dr. Drake Velasquez,    Please see note from this patient's audiogram from today. Please let me know if there is anything further you need.         Sameer Valdivia  Audiologist

## 2021-12-15 NOTE — PROGRESS NOTES
EXAM    GENERAL: No acute distress, alert and oriented, no hoarseness, strong voice  EYES: EOMI, Anti-icteric  HENT:   Head: Normocephalic and atraumatic. Face:  Symmetric, facial nerve intact  Right Ear: Normal external ear, normal external auditory canal, intact tympanic membrane with normal mobility and aerated middle ear  Left Ear: Normal external ear, normal external auditory canal, intact tympanic membrane with normal mobility and aerated middle ear  Mouth/Oral Cavity:  normal lips, Uvula is midline, no   Neuro:  cranial nerve II-XII intact; normal gait          PROCEDURE      This note was generated completely or in part utilizing Dragon dictation speech recognition software. Occasionally, words are mistranscribed and despite editing, the text may contain inaccuracies due to incorrect word recognition. If further clarification is needed please contact the office at [phone]. An electronic signature was used to authenticate this note.     --Ashwini Ramirez MD

## 2022-01-13 ENCOUNTER — OFFICE VISIT (OUTPATIENT)
Dept: PSYCHIATRY | Age: 87
End: 2022-01-13
Payer: MEDICARE

## 2022-01-13 DIAGNOSIS — F41.9 ANXIETY: ICD-10-CM

## 2022-01-13 PROCEDURE — 99213 OFFICE O/P EST LOW 20 MIN: CPT | Performed by: PSYCHIATRY & NEUROLOGY

## 2022-01-13 RX ORDER — CLONAZEPAM 0.5 MG/1
TABLET ORAL
Qty: 30 TABLET | Refills: 2 | Status: SHIPPED | OUTPATIENT
Start: 2022-01-13 | End: 2022-02-17 | Stop reason: SDUPTHER

## 2022-01-13 NOTE — PROGRESS NOTES
Take 10 mg by mouth daily      Omega-3 Fatty Acids (FISH OIL PO) Take 1 tablet by mouth daily      nitroGLYCERIN (NITROSTAT) 0.4 MG SL tablet Place 0.4 mg under the tongue every 5 minutes as needed for Chest pain Dissolve 1 tab under tongue at first sign of chest pain. May repeat every 5 minutes until relief is obtained. If pain persists after taking 3 tabs in a 15-minute period, or the pain is different than is typically experienced, call 9-1-1 immediately.  Multiple Vitamin (MULTI VITAMIN DAILY PO) Take by mouth Prodovite      Ascorbic Acid (VITAMIN C) 500 MG CAPS Take 1 tablet by mouth      Potassium Gluconate 550 MG TABS Take by mouth      Vitamin D (CHOLECALCIFEROL) 1000 UNITS CAPS capsule Take 1,000 Units by mouth daily      aspirin 81 MG tablet Take 81 mg by mouth daily. No current facility-administered medications for this visit. ROS:  No tremor, gait nl    MSE:    A-casually dressed, good eC, pleasant and engageable  A-full  M-better now that the fight is over. S-grossly A + O  I/J-fair/fair  T-linear, goal-directed.  Speech c nl r/t/v/a.  No S/H I, no a/vh.       A/P 88 y/o F c      1. OCPD traits, anxiety NOS-About the same. Since klonopin 0.25 tid prn.  Sees Cards  [id number].   Sees a therapist Yahaira Pandey for anxiety centered around body.       2.  Memory trouble-Seen by Dr. 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       Has been on prozac, zoloft, effexor, klonopin, xanax, lexapro, paxil.       I have spent >23 mins with this patient on working on coping skills and med mgt, and > 1/2 of that time was spent counseling this pt.

## 2022-02-08 ENCOUNTER — HOSPITAL ENCOUNTER (OUTPATIENT)
Age: 87
Setting detail: OBSERVATION
Discharge: HOME | End: 2022-02-10
Attending: EMERGENCY MEDICINE | Admitting: INTERNAL MEDICINE
Payer: MEDICARE

## 2022-02-08 ENCOUNTER — APPOINTMENT (OUTPATIENT)
Dept: CT IMAGING | Age: 87
End: 2022-02-08
Payer: MEDICARE

## 2022-02-08 ENCOUNTER — APPOINTMENT (OUTPATIENT)
Dept: GENERAL RADIOLOGY | Age: 87
End: 2022-02-08
Payer: MEDICARE

## 2022-02-08 DIAGNOSIS — R53.1 GENERALIZED WEAKNESS: ICD-10-CM

## 2022-02-08 DIAGNOSIS — R53.83 OTHER FATIGUE: ICD-10-CM

## 2022-02-08 DIAGNOSIS — M79.10 MYALGIA: ICD-10-CM

## 2022-02-08 DIAGNOSIS — R26.2 DIFFICULTY WALKING: Primary | ICD-10-CM

## 2022-02-08 LAB
ALBUMIN SERPL-MCNC: 3.7 G/DL (ref 3.4–5)
ALP BLD-CCNC: 64 U/L (ref 40–129)
ALT SERPL-CCNC: 8 U/L (ref 10–40)
ANION GAP SERPL CALCULATED.3IONS-SCNC: 15 MMOL/L (ref 3–16)
AST SERPL-CCNC: 18 U/L (ref 15–37)
BASOPHILS ABSOLUTE: 0 K/UL (ref 0–0.2)
BASOPHILS RELATIVE PERCENT: 0.4 %
BILIRUB SERPL-MCNC: 0.3 MG/DL (ref 0–1)
BILIRUBIN DIRECT: <0.2 MG/DL (ref 0–0.3)
BILIRUBIN URINE: NEGATIVE
BILIRUBIN, INDIRECT: ABNORMAL MG/DL (ref 0–1)
BLOOD, URINE: NEGATIVE
BUN BLDV-MCNC: 13 MG/DL (ref 7–20)
CALCIUM SERPL-MCNC: 9.1 MG/DL (ref 8.3–10.6)
CHLORIDE BLD-SCNC: 101 MMOL/L (ref 99–110)
CLARITY: CLEAR
CO2: 21 MMOL/L (ref 21–32)
COLOR: YELLOW
CREAT SERPL-MCNC: 0.5 MG/DL (ref 0.6–1.2)
EKG ATRIAL RATE: 357 BPM
EKG DIAGNOSIS: NORMAL
EKG P-R INTERVAL: 224 MS
EKG Q-T INTERVAL: 430 MS
EKG QRS DURATION: 152 MS
EKG QTC CALCULATION (BAZETT): 477 MS
EKG R AXIS: -55 DEGREES
EKG T AXIS: 95 DEGREES
EKG VENTRICULAR RATE: 74 BPM
EOSINOPHILS ABSOLUTE: 0.1 K/UL (ref 0–0.6)
EOSINOPHILS RELATIVE PERCENT: 0.8 %
GFR AFRICAN AMERICAN: >60
GFR NON-AFRICAN AMERICAN: >60
GLUCOSE BLD-MCNC: 103 MG/DL (ref 70–99)
GLUCOSE URINE: NEGATIVE MG/DL
HCT VFR BLD CALC: 37.1 % (ref 36–48)
HEMOGLOBIN: 12.1 G/DL (ref 12–16)
KETONES, URINE: ABNORMAL MG/DL
LEUKOCYTE ESTERASE, URINE: NEGATIVE
LYMPHOCYTES ABSOLUTE: 0.9 K/UL (ref 1–5.1)
LYMPHOCYTES RELATIVE PERCENT: 7.9 %
MCH RBC QN AUTO: 31.4 PG (ref 26–34)
MCHC RBC AUTO-ENTMCNC: 32.7 G/DL (ref 31–36)
MCV RBC AUTO: 96.3 FL (ref 80–100)
MICROSCOPIC EXAMINATION: ABNORMAL
MONOCYTES ABSOLUTE: 1.1 K/UL (ref 0–1.3)
MONOCYTES RELATIVE PERCENT: 9.4 %
NEUTROPHILS ABSOLUTE: 9.5 K/UL (ref 1.7–7.7)
NEUTROPHILS RELATIVE PERCENT: 81.5 %
NITRITE, URINE: NEGATIVE
PDW BLD-RTO: 13.3 % (ref 12.4–15.4)
PH UA: 6 (ref 5–8)
PLATELET # BLD: 536 K/UL (ref 135–450)
PMV BLD AUTO: 6.9 FL (ref 5–10.5)
POTASSIUM REFLEX MAGNESIUM: 4.1 MMOL/L (ref 3.5–5.1)
PROTEIN UA: NEGATIVE MG/DL
RAPID INFLUENZA  B AGN: NEGATIVE
RAPID INFLUENZA A AGN: NEGATIVE
RBC # BLD: 3.86 M/UL (ref 4–5.2)
SARS-COV-2, NAAT: NOT DETECTED
SEDIMENTATION RATE, ERYTHROCYTE: 68 MM/HR (ref 0–30)
SODIUM BLD-SCNC: 137 MMOL/L (ref 136–145)
SPECIFIC GRAVITY UA: 1.01 (ref 1–1.03)
TOTAL CK: 27 U/L (ref 26–192)
TOTAL PROTEIN: 7.3 G/DL (ref 6.4–8.2)
TROPONIN: <0.01 NG/ML
URINE REFLEX TO CULTURE: ABNORMAL
URINE TYPE: ABNORMAL
UROBILINOGEN, URINE: 0.2 E.U./DL
WBC # BLD: 11.7 K/UL (ref 4–11)

## 2022-02-08 PROCEDURE — 6370000000 HC RX 637 (ALT 250 FOR IP): Performed by: INTERNAL MEDICINE

## 2022-02-08 PROCEDURE — 93010 ELECTROCARDIOGRAM REPORT: CPT | Performed by: INTERNAL MEDICINE

## 2022-02-08 PROCEDURE — 51701 INSERT BLADDER CATHETER: CPT

## 2022-02-08 PROCEDURE — 6370000000 HC RX 637 (ALT 250 FOR IP): Performed by: NURSE PRACTITIONER

## 2022-02-08 PROCEDURE — 80076 HEPATIC FUNCTION PANEL: CPT

## 2022-02-08 PROCEDURE — 81003 URINALYSIS AUTO W/O SCOPE: CPT

## 2022-02-08 PROCEDURE — 80048 BASIC METABOLIC PNL TOTAL CA: CPT

## 2022-02-08 PROCEDURE — 84484 ASSAY OF TROPONIN QUANT: CPT

## 2022-02-08 PROCEDURE — 85025 COMPLETE CBC W/AUTO DIFF WBC: CPT

## 2022-02-08 PROCEDURE — 85652 RBC SED RATE AUTOMATED: CPT

## 2022-02-08 PROCEDURE — G0378 HOSPITAL OBSERVATION PER HR: HCPCS

## 2022-02-08 PROCEDURE — 93005 ELECTROCARDIOGRAM TRACING: CPT | Performed by: PHYSICIAN ASSISTANT

## 2022-02-08 PROCEDURE — 71045 X-RAY EXAM CHEST 1 VIEW: CPT

## 2022-02-08 PROCEDURE — 82550 ASSAY OF CK (CPK): CPT

## 2022-02-08 PROCEDURE — 70450 CT HEAD/BRAIN W/O DYE: CPT

## 2022-02-08 PROCEDURE — 6370000000 HC RX 637 (ALT 250 FOR IP): Performed by: PHYSICIAN ASSISTANT

## 2022-02-08 PROCEDURE — 87804 INFLUENZA ASSAY W/OPTIC: CPT

## 2022-02-08 PROCEDURE — 99283 EMERGENCY DEPT VISIT LOW MDM: CPT

## 2022-02-08 PROCEDURE — 87635 SARS-COV-2 COVID-19 AMP PRB: CPT

## 2022-02-08 RX ORDER — POTASSIUM CHLORIDE 20 MEQ/1
40 TABLET, EXTENDED RELEASE ORAL PRN
Status: DISCONTINUED | OUTPATIENT
Start: 2022-02-08 | End: 2022-02-10 | Stop reason: HOSPADM

## 2022-02-08 RX ORDER — SODIUM CHLORIDE 0.9 % (FLUSH) 0.9 %
10 SYRINGE (ML) INJECTION PRN
Status: DISCONTINUED | OUTPATIENT
Start: 2022-02-08 | End: 2022-02-10 | Stop reason: HOSPADM

## 2022-02-08 RX ORDER — ASCORBIC ACID 500 MG
500 TABLET ORAL
COMMUNITY

## 2022-02-08 RX ORDER — LISINOPRIL 10 MG/1
10 TABLET ORAL DAILY
Status: DISCONTINUED | OUTPATIENT
Start: 2022-02-09 | End: 2022-02-10 | Stop reason: HOSPADM

## 2022-02-08 RX ORDER — AMLODIPINE BESYLATE 5 MG/1
10 TABLET ORAL DAILY
Status: DISCONTINUED | OUTPATIENT
Start: 2022-02-09 | End: 2022-02-10 | Stop reason: HOSPADM

## 2022-02-08 RX ORDER — SENNA PLUS 8.6 MG/1
1 TABLET ORAL 2 TIMES DAILY
Status: DISCONTINUED | OUTPATIENT
Start: 2022-02-08 | End: 2022-02-10 | Stop reason: HOSPADM

## 2022-02-08 RX ORDER — PROMETHAZINE HYDROCHLORIDE 25 MG/1
12.5 TABLET ORAL EVERY 6 HOURS PRN
Status: DISCONTINUED | OUTPATIENT
Start: 2022-02-08 | End: 2022-02-10 | Stop reason: HOSPADM

## 2022-02-08 RX ORDER — MAGNESIUM SULFATE IN WATER 40 MG/ML
2000 INJECTION, SOLUTION INTRAVENOUS PRN
Status: DISCONTINUED | OUTPATIENT
Start: 2022-02-08 | End: 2022-02-10 | Stop reason: HOSPADM

## 2022-02-08 RX ORDER — POTASSIUM CHLORIDE 7.45 MG/ML
10 INJECTION INTRAVENOUS PRN
Status: DISCONTINUED | OUTPATIENT
Start: 2022-02-08 | End: 2022-02-08 | Stop reason: SDUPTHER

## 2022-02-08 RX ORDER — ONDANSETRON 2 MG/ML
4 INJECTION INTRAMUSCULAR; INTRAVENOUS EVERY 6 HOURS PRN
Status: DISCONTINUED | OUTPATIENT
Start: 2022-02-08 | End: 2022-02-10 | Stop reason: HOSPADM

## 2022-02-08 RX ORDER — POTASSIUM CHLORIDE 7.45 MG/ML
10 INJECTION INTRAVENOUS PRN
Status: DISCONTINUED | OUTPATIENT
Start: 2022-02-08 | End: 2022-02-10 | Stop reason: HOSPADM

## 2022-02-08 RX ORDER — PREDNISONE 1 MG/1
10 TABLET ORAL DAILY
Status: DISCONTINUED | OUTPATIENT
Start: 2022-02-08 | End: 2022-02-10 | Stop reason: HOSPADM

## 2022-02-08 RX ORDER — ACETAMINOPHEN 325 MG/1
650 TABLET ORAL EVERY 6 HOURS PRN
Status: DISCONTINUED | OUTPATIENT
Start: 2022-02-08 | End: 2022-02-10 | Stop reason: HOSPADM

## 2022-02-08 RX ORDER — METOPROLOL SUCCINATE 25 MG/1
25 TABLET, EXTENDED RELEASE ORAL NIGHTLY
Status: DISCONTINUED | OUTPATIENT
Start: 2022-02-08 | End: 2022-02-10 | Stop reason: HOSPADM

## 2022-02-08 RX ORDER — SODIUM CHLORIDE 0.9 % (FLUSH) 0.9 %
10 SYRINGE (ML) INJECTION EVERY 12 HOURS SCHEDULED
Status: DISCONTINUED | OUTPATIENT
Start: 2022-02-08 | End: 2022-02-10 | Stop reason: HOSPADM

## 2022-02-08 RX ORDER — METOPROLOL SUCCINATE 25 MG/1
25 TABLET, EXTENDED RELEASE ORAL NIGHTLY
COMMUNITY

## 2022-02-08 RX ORDER — ACETAMINOPHEN 650 MG/1
650 SUPPOSITORY RECTAL EVERY 6 HOURS PRN
Status: DISCONTINUED | OUTPATIENT
Start: 2022-02-08 | End: 2022-02-10 | Stop reason: HOSPADM

## 2022-02-08 RX ORDER — SODIUM CHLORIDE 9 MG/ML
25 INJECTION, SOLUTION INTRAVENOUS PRN
Status: DISCONTINUED | OUTPATIENT
Start: 2022-02-08 | End: 2022-02-10 | Stop reason: HOSPADM

## 2022-02-08 RX ORDER — CLONAZEPAM 0.5 MG/1
0.5 TABLET ORAL DAILY
Status: DISCONTINUED | OUTPATIENT
Start: 2022-02-09 | End: 2022-02-10 | Stop reason: HOSPADM

## 2022-02-08 RX ADMIN — PREDNISONE 10 MG: 5 TABLET ORAL at 16:53

## 2022-02-08 RX ADMIN — METOPROLOL SUCCINATE 25 MG: 25 TABLET, FILM COATED, EXTENDED RELEASE ORAL at 23:42

## 2022-02-08 RX ADMIN — SENNOSIDES 8.6 MG: 8.6 TABLET, COATED ORAL at 23:42

## 2022-02-08 ASSESSMENT — PAIN DESCRIPTION - DESCRIPTORS
DESCRIPTORS: ACHING
DESCRIPTORS: ACHING

## 2022-02-08 ASSESSMENT — PAIN DESCRIPTION - PAIN TYPE
TYPE: ACUTE PAIN;CHRONIC PAIN
TYPE: ACUTE PAIN
TYPE: ACUTE PAIN

## 2022-02-08 ASSESSMENT — PAIN DESCRIPTION - ONSET
ONSET: ON-GOING
ONSET: ON-GOING

## 2022-02-08 ASSESSMENT — PAIN - FUNCTIONAL ASSESSMENT
PAIN_FUNCTIONAL_ASSESSMENT: PREVENTS OR INTERFERES SOME ACTIVE ACTIVITIES AND ADLS
PAIN_FUNCTIONAL_ASSESSMENT: PREVENTS OR INTERFERES SOME ACTIVE ACTIVITIES AND ADLS

## 2022-02-08 ASSESSMENT — ENCOUNTER SYMPTOMS
COLOR CHANGE: 0
NAUSEA: 0
VOMITING: 0
SHORTNESS OF BREATH: 0
ABDOMINAL PAIN: 0
COUGH: 0

## 2022-02-08 ASSESSMENT — PAIN DESCRIPTION - PROGRESSION
CLINICAL_PROGRESSION: NOT CHANGED
CLINICAL_PROGRESSION: NOT CHANGED

## 2022-02-08 ASSESSMENT — PAIN DESCRIPTION - LOCATION
LOCATION: GENERALIZED
LOCATION: OTHER (COMMENT)

## 2022-02-08 ASSESSMENT — PAIN DESCRIPTION - FREQUENCY
FREQUENCY: CONTINUOUS

## 2022-02-08 ASSESSMENT — PAIN SCALES - GENERAL
PAINLEVEL_OUTOF10: 10
PAINLEVEL_OUTOF10: 8

## 2022-02-08 NOTE — PROGRESS NOTES
Medication Reconciliation    List of medications patient is currently taking is complete. Source of information: 1. Conversation with patient                                       2. EPIC records      Allergies  Sulfa antibiotics, Trimethoprim, and Bactrim [sulfamethoxazole-trimethoprim]    Patient received the majority of her morning home medications today - she did not take her Amlodipine yet. Takes Toprol XL at night.      Kathleen Vela [facility], PharmD, BCPS  2/8/2022 3:40 PM

## 2022-02-08 NOTE — ED PROVIDER NOTES
[address] [place] [address]  eMERGENCY dEPARTMENT eNCOUnter   Physician Attestation    Pt Name: Cyndi Kraft  MRN: [id number]  [place] 1934  Date of evaluation: 2/8/22        Physician Note:    I havepersonally performed and/or participated in the history, exam and medical decision making and agree with all pertinent clinical information. I have also reviewed and agree with the past medical, family and social historyunless otherwise noted. I have personally performed a face to face diagnostic evaluation onthis patient. I have reviewed the mid-levels findings and agree. History: This is an 41-year-old female who still lives in her own home by herself. She does, however, have excellent support from her children. She is here complaining of fatigue and generalized aches and pains over the past 2 weeks. By description she had a physical therapist visit that only happened once. She is basically just complaining of generalized pains in all of her joints and muscles. No falls no trauma no chest pain or shortness of breath. She does have a cardiac history. Physical Exam  Constitutional:       Appearance: She is well-developed. She is not diaphoretic. HENT:      Head: Normocephalic and atraumatic. Right Ear: External ear normal.      Left Ear: External ear normal.   Eyes:      General: No scleral icterus. Right eye: No discharge. Left eye: No discharge. Neck:      Thyroid: No thyromegaly. Vascular: No JVD. Trachea: No tracheal deviation. Cardiovascular:      Rate and Rhythm: Normal rate and regular rhythm. Heart sounds: Murmur heard. Systolic murmur is present with a grade of 1/6. No friction rub. No gallop. Pulmonary:      Effort: Pulmonary effort is normal. No respiratory distress. Breath sounds: Normal breath sounds. No stridor. No wheezing or rales. Abdominal:      General: There is no distension. Palpations: Abdomen is soft. infarcts are again seen in both basal ganglia. Low attenuation is again seen centrally in the midbrain, also unchanged. The gray-white differentiation is maintained without evidence of an acute infarct. There is no evidence of hydrocephalus. ORBITS: The visualized portion of the orbits demonstrate no acute abnormality. SINUSES: The visualized paranasal sinuses and mastoid air cells demonstrate no acute abnormality. SOFT TISSUES/SKULL:  No acute abnormality of the visualized skull or soft tissues. No acute intracranial abnormality. Atrophy and chronic white matter changes, similar in appearance. XR CHEST PORTABLE    Result Date: 2/8/2022  EXAMINATION: ONE XRAY VIEW OF THE CHEST 2/8/2022 2:22 pm COMPARISON: 12/22/2019 HISTORY: ORDERING SYSTEM PROVIDED HISTORY: fatigue TECHNOLOGIST PROVIDED HISTORY: Reason for exam:->fatigue Reason for Exam: fatigue FINDINGS: The patient is rotated. A left subclavian 2 lead cardiac pacemaker is present. Cardiac silhouette is normal.  Thoracic aorta is tortuous. There is no consolidation, pleural effusion or pneumothorax. No acute cardiopulmonary disease.      Results for orders placed or performed during the hospital encounter of 02/08/22   CBC Auto Differential   Result Value Ref Range    WBC 11.7 (H) 4.0 - 11.0 K/uL    RBC 3.86 (L) 4.00 - 5.20 M/uL    Hemoglobin 12.1 12.0 - 16.0 g/dL    Hematocrit 37.1 36.0 - 48.0 %    MCV 96.3 80.0 - 100.0 fL    MCH 31.4 26.0 - 34.0 pg    MCHC 32.7 31.0 - 36.0 g/dL    RDW 13.3 12.4 - 15.4 %    Platelets 469 (H) 316 - 450 K/uL    MPV 6.9 5.0 - 10.5 fL    Neutrophils % 81.5 %    Lymphocytes % 7.9 %    Monocytes % 9.4 %    Eosinophils % 0.8 %    Basophils % 0.4 %    Neutrophils Absolute 9.5 (H) 1.7 - 7.7 K/uL    Lymphocytes Absolute 0.9 (L) 1.0 - 5.1 K/uL    Monocytes Absolute 1.1 0.0 - 1.3 K/uL    Eosinophils Absolute 0.1 0.0 - 0.6 K/uL    Basophils Absolute 0.0 0.0 - 0.2 K/uL   Basic Metabolic Panel w/ Reflex to MG   Result Value Ref Range    Sodium 137 136 - 145 mmol/L    Potassium reflex Magnesium 4.1 3.5 - 5.1 mmol/L    Chloride 101 99 - 110 mmol/L    CO2 21 21 - 32 mmol/L    Anion Gap 15 3 - 16    Glucose 103 (H) 70 - 99 mg/dL    BUN 13 7 - 20 mg/dL    CREATININE 0.5 (L) 0.6 - 1.2 mg/dL    GFR Non-African American >60 >60    GFR African American >60 >60    Calcium 9.1 8.3 - 10.6 mg/dL   Hepatic Function Panel   Result Value Ref Range    Total Protein 7.3 6.4 - 8.2 g/dL    Albumin 3.7 3.4 - 5.0 g/dL    Alkaline Phosphatase 64 40 - 129 U/L    ALT 8 (L) 10 - 40 U/L    AST 18 15 - 37 U/L    Total Bilirubin 0.3 0.0 - 1.0 mg/dL    Bilirubin, Direct <0.2 0.0 - 0.3 mg/dL    Bilirubin, Indirect see below 0.0 - 1.0 mg/dL   Troponin   Result Value Ref Range    Troponin <0.01 <0.01 ng/mL   Urinalysis Reflex to Culture    Specimen: Urine, clean catch   Result Value Ref Range    Color, UA YELLOW Straw/Yellow    Clarity, UA Clear Clear    Glucose, Ur Negative Negative mg/dL    Bilirubin Urine Negative Negative    Ketones, Urine TRACE (A) Negative mg/dL    Specific Gravity, UA 1.013 1.005 - 1.030    Blood, Urine Negative Negative    pH, UA 6.0 5.0 - 8.0    Protein, UA Negative Negative mg/dL    Urobilinogen, Urine 0.2 <2.0 E.U./dL    Nitrite, Urine Negative Negative    Leukocyte Esterase, Urine Negative Negative    Microscopic Examination Not Indicated     Urine Type NotGiven     Urine Reflex to Culture Not Indicated    CK   Result Value Ref Range    Total CK 27 26 - 192 U/L   Sedimentation Rate   Result Value Ref Range    Sed Rate 68 (H) 0 - 30 mm/Hr   EKG 12 Lead   Result Value Ref Range    Ventricular Rate 74 BPM    Atrial Rate 357 BPM    P-R Interval 224 ms    QRS Duration 152 ms    Q-T Interval 430 ms    QTc Calculation (Bazett) 477 ms    R Axis -55 degrees    T Axis 95 degrees    Diagnosis       AV sequential or dual chamber electronic pacemakerAbnormal ECGWhen compared with ECG of 22-DEC-2019 11:21,Vent.  rate has decreased BY  11 BPMConfirmed by BREANN FERGUSON, Tio Beckham ([id number]) on 2/8/2022 5:32:02 PM     1 thought is perhaps she has developed polymyalgia rheumatica. I did add a sed rate but that is not necessarily diagnostic of this condition. She does not have a headache. 1. Difficulty walking    2. Generalized weakness    3. Other fatigue    4. Myalgia          DISPOSITION/PLAN  PATIENT REFERRED TO:  No follow-up provider specified.   DISCHARGE MEDICATIONS:  New Prescriptions    No medications on file         MD Mikal Jara MD  02/08/22 [id number]

## 2022-02-08 NOTE — ED NOTES
Pt placed on a bedpan, but no sample was collected. Notified EDPA of delay.       Briana Richmond, RN  02/08/22 [address] Jaja, RN  02/08/22 [id number]

## 2022-02-08 NOTE — ED NOTES
Observation  DX: Difficulty Waking     IV: No line    Meds: 1653: Prednisone 10mg PO     Labs: Urine: trace ketones, WBC: 11.7; RBC: 3.86; ALT:8; Sed Rate: 68    Chest X-Ray: WNL    CT Head: No acute changes      Obdulia Paulino RN  02/08/22 2774

## 2022-02-08 NOTE — ED PROVIDER NOTES
[address] [facility]        Pt Name: Harika Hart  MRN: [id number]  [place] 1934  Date of evaluation: 2/8/2022  Provider: ARIE Rocha  PCP: RADHA Cerna  Note Started: 3:33 PM EST        I have seen and evaluated this patient with my supervising physician Maria Isabel Amato MD.    [address] [place] [address]       Chief Complaint   Patient presents with    Fatigue     1-2 wks; joint and muscle pain; sedentary lifestyle, lost all motivation to exercise d/t the pain/weakness        HISTORY OF PRESENT ILLNESS   (Location, Timing/Onset, Context/Setting, Quality, Duration, Modifying Factors, Severity, Associated Signs and Symptoms)  Note limiting factors. Chief Complaint: Fatigue, joint/muscle aches, difficulty walking     Harika Hart is a 80 y.o. female who presents to the emergency department today with 1 to 2 weeks of joint and muscle pain, weakness, fatigue. The patient states that her symptoms have been bad for the last 2 weeks, but getting worse over the last few days to the point where she is having a hard time even getting up to go to the bathroom. She cannot take care of herself due to the symptoms. She called her primary care provider, who recommended that she get blood work done, but the patient was too weak to even get that done. She has no chest pain, shortness of breath. She denies abdominal pain, nausea or vomiting. She has no urinary symptoms. She has no further complaints at this time. Nursing Notes were all reviewed and agreed with or any disagreements were addressed in the HPI. REVIEW OF SYSTEMS    (2-9 systems for level 4, 10 or more for level 5)     Review of Systems   Constitutional: Positive for fatigue. Negative for chills and fever. Respiratory: Negative for cough and shortness of breath. Cardiovascular: Negative for chest pain and palpitations.    Gastrointestinal: Negative for abdominal pain, nausea and vomiting. Genitourinary: Negative for dysuria and frequency. Musculoskeletal: Positive for arthralgias, gait problem and myalgias. Negative for joint swelling. Skin: Negative for color change and rash. Neurological: Positive for weakness. Negative for dizziness, light-headedness and numbness. Positives and Pertinent negatives as per HPI. Except as noted above in the ROS, all other systems were reviewed and negative. PAST MEDICAL HISTORY     Past Medical History:   Diagnosis Date    Aortic insufficiency     Mild    Breast cancer ([facility]     Left, chemo and radiation    CAD (coronary artery disease)     Heart attack ([facility] [facility] 09/20/2013    HTN (hypertension)     Hx of blood clots     1996 after chemo    SVT (supraventricular tachycardia) ([facility] [facility]          SURGICAL HISTORY     Past Surgical History:   Procedure Laterality Date    BREAST SURGERY      CARDIAC SURGERY      STENTS    CATARACT REMOVAL WITH IMPLANT Left 06/04/2018    Dr. Shawnee Sauceda  2013    2 stents    HYSTERECTOMY N/A 07/07/2016    MASTECTOMY Left 1996    SKIN CANCER EXCISION      UPPER GASTROINTESTINAL ENDOSCOPY  08/09/2018    Dr Colonel Barrios, biopsies         CURRENTMEDICATIONS       Previous Medications    AMLODIPINE (NORVASC) 10 MG TABLET    Take 10 mg by mouth daily    CLONAZEPAM (KLONOPIN) 0.5 MG TABLET    Take 1/4 tab three times daily. LISINOPRIL (PRINIVIL;ZESTRIL) 10 MG TABLET    Take 1 tablet by mouth daily    METOPROLOL SUCCINATE (TOPROL XL) 25 MG EXTENDED RELEASE TABLET    Take 25 mg by mouth at bedtime     MULTIPLE VITAMIN (MULTI VITAMIN DAILY PO)    Take 1 tablet by mouth daily     NITROGLYCERIN (NITROSTAT) 0.4 MG SL TABLET    Place 0.4 mg under the tongue every 5 minutes as needed for Chest pain Dissolve 1 tab under tongue at first sign of chest pain. May repeat every 5 minutes until relief is obtained.  If pain persists after taking 3 tabs in a 15-minute period, or the pain is different than is typically experienced, call 9-1-1 immediately. VITAMIN C (ASCORBIC ACID) 500 MG TABLET    Take 500 mg by mouth 3 times daily (with meals)    VITAMIN D (CHOLECALCIFEROL) 1000 UNITS CAPS CAPSULE    Take 1,000 Units by mouth daily         ALLERGIES     Sulfa antibiotics, Trimethoprim, and Bactrim [sulfamethoxazole-trimethoprim]    FAMILYHISTORY       Family History   Problem Relation Age of Onset    Heart Disease Mother     Colon Cancer Father           SOCIAL HISTORY       Social History     Tobacco Use    Smoking status: Former Smoker     Quit date:      Years since quittin.1    Smokeless tobacco: Never Used    Tobacco comment: quit 30 yrs   Vaping Use    Vaping Use: Never used   Substance Use Topics    Alcohol use: No    Drug use: No       SCREENINGS             PHYSICAL EXAM    (up to 7 for level 4, 8 or more for level 5)     ED Triage Vitals [22 1304]   BP Temp Temp Source Pulse Resp SpO2 Height Weight   (!) 144/69 98.3 °F (36.8 °C) Oral 82 16 98 % 5' 5\" (1.651 m) 132 lb 15 oz (60.3 kg)       Physical Exam  Vitals and nursing note reviewed. Constitutional:       General: She is not in acute distress. Appearance: Normal appearance. She is well-developed. She is not ill-appearing, toxic-appearing or diaphoretic. HENT:      Head: Normocephalic and atraumatic. Mouth/Throat:      Mouth: Mucous membranes are moist.      Pharynx: Oropharynx is clear. Eyes:      Conjunctiva/sclera: Conjunctivae normal.      Pupils: Pupils are equal, round, and reactive to light. Cardiovascular:      Rate and Rhythm: Normal rate and regular rhythm. Pulses: Normal pulses. Pulmonary:      Effort: Pulmonary effort is normal. No respiratory distress. Abdominal:      General: Bowel sounds are normal. There is no distension. Palpations: Abdomen is soft. Tenderness: There is no abdominal tenderness.    Musculoskeletal:         General: No tenderness or deformity. Cervical back: Normal range of motion and neck supple. Right lower leg: No edema. Left lower leg: No edema. Skin:     General: Skin is warm and dry. Neurological:      General: No focal deficit present. Mental Status: She is alert and oriented to person, place, and time. Cranial Nerves: No cranial nerve deficit. Sensory: No sensory deficit. Psychiatric:         Mood and Affect: Mood normal.         Behavior: Behavior normal. Behavior is cooperative. Thought Content:  Thought content normal.         DIAGNOSTIC RESULTS   LABS:    Labs Reviewed   CBC WITH AUTO DIFFERENTIAL - Abnormal; Notable for the following components:       Result Value    WBC 11.7 (*)     RBC 3.86 (*)     Platelets 665 (*)     Neutrophils Absolute 9.5 (*)     Lymphocytes Absolute 0.9 (*)     All other components within normal limits    Narrative:     Performed at:  [address] [place]   Phone [phone]   BASIC METABOLIC PANEL W/ REFLEX TO MG FOR LOW K - Abnormal; Notable for the following components:    Glucose 103 (*)     CREATININE 0.5 (*)     All other components within normal limits    Narrative:     Performed at:  [address] [place]   Phone [phone]   HEPATIC FUNCTION PANEL - Abnormal; Notable for the following components:    ALT 8 (*)     All other components within normal limits    Narrative:     Performed at:  [address] [place]   Phone [phone]   URINE RT REFLEX TO CULTURE - Abnormal; Notable for the following components:    Ketones, Urine TRACE (*)     All other components within normal limits    Narrative:     Performed at:  [address] [place]   Phone [phone]   SEDIMENTATION RATE - Abnormal; Notable for the following components:    Sed Rate 68 (*)     All other components within normal limits    Narrative:     Performed at:  [facility]  [address] [place] [facility] [address]   Phone [phone]   TROPONIN    Narrative:     Performed at:  [facility]  [address] [facility] [address]   Phone [phone]   CK    Narrative:     Performed at:  [facility]  [address] [facility] [place] [facility] [address]   Phone [phone]       When ordered only abnormal lab results are displayed. All other labs were within normal range or not returned as of this dictation. EKG: When ordered, EKG's are interpreted by the Emergency Department Physician in the absence of a cardiologist.  Please see their note for interpretation of EKG. RADIOLOGY:   Non-plain film images such as CT, Ultrasound and MRI are read by the radiologist. Plain radiographic images are visualized and preliminarily interpreted by the ED Provider with the below findings:        Interpretation per the Radiologist below, if available at the time of this note:    XR CHEST PORTABLE   Final Result   No acute cardiopulmonary disease. CT Head WO Contrast   Final Result   No acute intracranial abnormality. Atrophy and chronic white matter changes, similar in appearance. CT Head WO Contrast    Result Date: 2/8/2022  EXAMINATION: CT OF THE HEAD WITHOUT CONTRAST  2/8/2022 1:45 pm TECHNIQUE: CT of the head was performed without the administration of intravenous contrast. Dose modulation, iterative reconstruction, and/or weight based adjustment of the mA/kV was utilized to reduce the radiation dose to as low as reasonably achievable. COMPARISON: CT head October 17, 2019.  HISTORY: ORDERING SYSTEM PROVIDED HISTORY: fatigue TECHNOLOGIST PROVIDED HISTORY: Reason for exam:->fatigue Has a \"code stroke\" or \"stroke alert\" been called? ->No Decision Support Exception - unselect if not a suspected or confirmed emergency medical condition->Emergency Medical Condition (MA) Reason for Exam: FATIGUE Additional signs and symptoms: Fatigue (1-2 wks; joint and muscle pain; sedentary lifestyle, lost all motivation to exercise d/t the pain/weakness FINDINGS: BRAIN/VENTRICLES: There is no acute intracranial hemorrhage, mass effect or midline shift. No abnormal extra-axial fluid collection. There is moderate diffuse atrophy and periventricular white matter hypodensities consistent chronic small vessel ischemic disease, similar in appearance. Small remote appearing lacunar infarcts are again seen in both basal ganglia. Low attenuation is again seen centrally in the midbrain, also unchanged. The gray-white differentiation is maintained without evidence of an acute infarct. There is no evidence of hydrocephalus. ORBITS: The visualized portion of the orbits demonstrate no acute abnormality. SINUSES: The visualized paranasal sinuses and mastoid air cells demonstrate no acute abnormality. SOFT TISSUES/SKULL:  No acute abnormality of the visualized skull or soft tissues. No acute intracranial abnormality. Atrophy and chronic white matter changes, similar in appearance. XR CHEST PORTABLE    Result Date: 2/8/2022  EXAMINATION: ONE XRAY VIEW OF THE CHEST 2/8/2022 2:22 pm COMPARISON: 12/22/2019 HISTORY: ORDERING SYSTEM PROVIDED HISTORY: fatigue TECHNOLOGIST PROVIDED HISTORY: Reason for exam:->fatigue Reason for Exam: fatigue FINDINGS: The patient is rotated. A left subclavian 2 lead cardiac pacemaker is present. Cardiac silhouette is normal.  Thoracic aorta is tortuous. There is no consolidation, pleural effusion or pneumothorax. No acute cardiopulmonary disease.            PROCEDURES   Unless otherwise noted below, none     Procedures      CONSULTS:  IP CONSULT TO HOSPITALIST      EMERGENCY DEPARTMENT COURSE and DIFFERENTIAL DIAGNOSIS/MDM:   Vitals:    Vitals:    02/08/22 1304   BP: (!) 144/69   Pulse: 82   Resp: 16   Temp: 98.3 °F (36.8 °C)   TempSrc: Oral   SpO2: 98%   Weight: 132 lb 15 oz (60.3 kg)   Height: 5' 5\" (1.651 m)       Patient was given the following medications:  Medications   predniSONE (DELTASONE) tablet 10 mg (10 mg Oral Given 2/8/22 1653)           ED COURSE & MEDICAL DECISION MAKING    - The patient presented to the ER with complaints of fatigue, weakness. Vital signs were reviewed. Exam as above. Peripheral IV placed. Labs, Imaging ordered. - Pertinent Labs & Imaging studies reviewed. (See chart for details)   -  Patient seen and evaluated in the emergency department. -  Triage and nursing notes reviewed and incorporated. -  Old chart records reviewed and incorporated. -   I have seen and evaluated this patient with my supervising physician Alba Hodgson MD.  -  Differential diagnosis includes: stroke, TIA, intracranial bleed, trauma, infection/sepsis, medication side effect, intoxication/withdrawal, metabolic/electrolyte abnormalities  -  Work-up included:  See above  -  ED treatment included:  prednisone  - Consults: Hospitalist  -  Results discussed with patient and/or family. Labs show white blood cell count of 11.7, no concerning anemia. Metabolic panel with no concerning abnormalities. Troponin is less than 0.01. CK is not elevated at 27. Urine shows no evidence of infection. Sed rate is elevated at 68. Imaging studies show no acute cardiopulmonary process. CT of the head with no acute intracranial abnormality. Please see attending physician's note for EKG interpretation. At this time, we recommend admission, as the patient is having difficulty walking, and taking care of herself. She would benefit from admission for further evaluation and management, consideration for placement in a skilled nursing facility. The patient and/or family is agreeable with plan of care and disposition.   - Disposition:   Admission for observation  - Critical Care: Zero    FINAL IMPRESSION      1. Difficulty walking    2. Generalized weakness    3. Other fatigue    4. Myalgia          DISPOSITION/PLAN   DISPOSITION Admitted 02/08/2022 05:32:23 PM      PATIENT REFERRED TO:  No follow-up provider specified. DISCHARGE MEDICATIONS:  New Prescriptions    No medications on file       DISCONTINUED MEDICATIONS:  Discontinued Medications    ALBUTEROL SULFATE HFA (VENTOLIN HFA) 108 (90 BASE) MCG/ACT INHALER    Inhale 2 puffs into the lungs 4 times daily for 5 days    ASCORBIC ACID (VITAMIN C) 500 MG CAPS    Take 500 mg by mouth daily     ASPIRIN 81 MG TABLET    Take 81 mg by mouth daily. ATORVASTATIN (LIPITOR) 40 MG TABLET    Take 40 mg by mouth    GABAPENTIN (NEURONTIN) 100 MG CAPSULE    Take 1 capsule by mouth 2 times daily for 30 days.  Intended supply: 30 days    MISC NATURAL PRODUCTS (TART CHERRY ADVANCED PO)    Take by mouth daily    OMEGA-3 FATTY ACIDS (FISH OIL PO)    Take 1 tablet by mouth daily    POTASSIUM GLUCONATE 550 MG TABS    Take by mouth    TURMERIC PO    Take by mouth daily              (Please note that portions of this note were completed with a voice recognition program.  Efforts were made to edit the dictations but occasionally words are mis-transcribed.)    ARIE Tello (electronically signed)            Lucía Okeefe, [address]  02/08/22 [id number]

## 2022-02-08 NOTE — ED NOTES
Bed: B-10  Expected date:   Expected time:   Means of arrival:   Comments:  Salbador López RN  02/08/22 [id number]

## 2022-02-09 LAB
ANION GAP SERPL CALCULATED.3IONS-SCNC: 12 MMOL/L (ref 3–16)
BUN BLDV-MCNC: 14 MG/DL (ref 7–20)
CALCIUM SERPL-MCNC: 9.2 MG/DL (ref 8.3–10.6)
CHLORIDE BLD-SCNC: 103 MMOL/L (ref 99–110)
CO2: 23 MMOL/L (ref 21–32)
CREAT SERPL-MCNC: 0.5 MG/DL (ref 0.6–1.2)
GFR AFRICAN AMERICAN: >60
GFR NON-AFRICAN AMERICAN: >60
GLUCOSE BLD-MCNC: 105 MG/DL (ref 70–99)
HCT VFR BLD CALC: 34 % (ref 36–48)
HEMOGLOBIN: 11.2 G/DL (ref 12–16)
MCH RBC QN AUTO: 31.9 PG (ref 26–34)
MCHC RBC AUTO-ENTMCNC: 33 G/DL (ref 31–36)
MCV RBC AUTO: 96.5 FL (ref 80–100)
PDW BLD-RTO: 13.2 % (ref 12.4–15.4)
PLATELET # BLD: 463 K/UL (ref 135–450)
PMV BLD AUTO: 7 FL (ref 5–10.5)
POTASSIUM REFLEX MAGNESIUM: 4.4 MMOL/L (ref 3.5–5.1)
RBC # BLD: 3.52 M/UL (ref 4–5.2)
SODIUM BLD-SCNC: 138 MMOL/L (ref 136–145)
WBC # BLD: 8.7 K/UL (ref 4–11)

## 2022-02-09 PROCEDURE — 97530 THERAPEUTIC ACTIVITIES: CPT

## 2022-02-09 PROCEDURE — 36415 COLL VENOUS BLD VENIPUNCTURE: CPT

## 2022-02-09 PROCEDURE — 6370000000 HC RX 637 (ALT 250 FOR IP): Performed by: PHYSICIAN ASSISTANT

## 2022-02-09 PROCEDURE — G0378 HOSPITAL OBSERVATION PER HR: HCPCS

## 2022-02-09 PROCEDURE — 96372 THER/PROPH/DIAG INJ SC/IM: CPT

## 2022-02-09 PROCEDURE — 80048 BASIC METABOLIC PNL TOTAL CA: CPT

## 2022-02-09 PROCEDURE — 85027 COMPLETE CBC AUTOMATED: CPT

## 2022-02-09 PROCEDURE — 97166 OT EVAL MOD COMPLEX 45 MIN: CPT

## 2022-02-09 PROCEDURE — 6370000000 HC RX 637 (ALT 250 FOR IP): Performed by: INTERNAL MEDICINE

## 2022-02-09 PROCEDURE — 6370000000 HC RX 637 (ALT 250 FOR IP): Performed by: NURSE PRACTITIONER

## 2022-02-09 PROCEDURE — 97116 GAIT TRAINING THERAPY: CPT

## 2022-02-09 PROCEDURE — 6360000002 HC RX W HCPCS: Performed by: INTERNAL MEDICINE

## 2022-02-09 PROCEDURE — 97162 PT EVAL MOD COMPLEX 30 MIN: CPT

## 2022-02-09 RX ORDER — PREDNISONE 10 MG/1
10 TABLET ORAL DAILY
Qty: 10 TABLET | Refills: 0 | Status: SHIPPED | OUTPATIENT
Start: 2022-02-10 | End: 2022-02-20

## 2022-02-09 RX ORDER — GABAPENTIN 100 MG/1
100 CAPSULE ORAL 2 TIMES DAILY
Status: DISCONTINUED | OUTPATIENT
Start: 2022-02-09 | End: 2022-02-10 | Stop reason: HOSPADM

## 2022-02-09 RX ADMIN — GABAPENTIN 100 MG: 100 CAPSULE ORAL at 21:00

## 2022-02-09 RX ADMIN — CLONAZEPAM 0.5 MG: 0.5 TABLET ORAL at 09:23

## 2022-02-09 RX ADMIN — AMLODIPINE BESYLATE 10 MG: 5 TABLET ORAL at 09:24

## 2022-02-09 RX ADMIN — PREDNISONE 10 MG: 5 TABLET ORAL at 09:24

## 2022-02-09 RX ADMIN — SENNOSIDES 8.6 MG: 8.6 TABLET, COATED ORAL at 21:00

## 2022-02-09 RX ADMIN — LISINOPRIL 10 MG: 10 TABLET ORAL at 09:24

## 2022-02-09 RX ADMIN — ENOXAPARIN SODIUM 40 MG: 100 INJECTION SUBCUTANEOUS at 09:24

## 2022-02-09 RX ADMIN — SENNOSIDES 8.6 MG: 8.6 TABLET, COATED ORAL at 09:23

## 2022-02-09 RX ADMIN — METOPROLOL SUCCINATE 25 MG: 25 TABLET, FILM COATED, EXTENDED RELEASE ORAL at 21:00

## 2022-02-09 RX ADMIN — ACETAMINOPHEN 650 MG: 325 TABLET ORAL at 12:37

## 2022-02-09 ASSESSMENT — PAIN DESCRIPTION - DESCRIPTORS
DESCRIPTORS: ACHING
DESCRIPTORS: ACHING
DESCRIPTORS: ACHING;CONSTANT

## 2022-02-09 ASSESSMENT — PAIN - FUNCTIONAL ASSESSMENT
PAIN_FUNCTIONAL_ASSESSMENT: PREVENTS OR INTERFERES SOME ACTIVE ACTIVITIES AND ADLS

## 2022-02-09 ASSESSMENT — PAIN SCALES - GENERAL
PAINLEVEL_OUTOF10: 8
PAINLEVEL_OUTOF10: 5
PAINLEVEL_OUTOF10: 2
PAINLEVEL_OUTOF10: 0

## 2022-02-09 ASSESSMENT — PAIN DESCRIPTION - PAIN TYPE
TYPE: CHRONIC PAIN

## 2022-02-09 ASSESSMENT — PAIN DESCRIPTION - FREQUENCY
FREQUENCY: CONTINUOUS

## 2022-02-09 ASSESSMENT — PAIN DESCRIPTION - PROGRESSION
CLINICAL_PROGRESSION: NOT CHANGED
CLINICAL_PROGRESSION: NOT CHANGED
CLINICAL_PROGRESSION: GRADUALLY IMPROVING

## 2022-02-09 ASSESSMENT — PAIN DESCRIPTION - LOCATION
LOCATION: OTHER (COMMENT)
LOCATION: GENERALIZED
LOCATION: OTHER (COMMENT)

## 2022-02-09 ASSESSMENT — PAIN DESCRIPTION - ONSET
ONSET: ON-GOING

## 2022-02-09 NOTE — PLAN OF CARE
Problem: Skin Integrity:  Goal: Will show no infection signs and symptoms  Description: Will show no infection signs and symptoms  Outcome: Ongoing  Note: Pt assessed for infection,  VVS, WBC being monitored. Reviewed information with pt and family, pt verbalized understanding     Goal: Absence of new skin breakdown  Description: Absence of new skin breakdown  Outcome: Ongoing  Note: Gianni score assessed. Patient able to ambulate and turn self. Repositioned patient Q2H and assessed skin. Educated patient on importance of repositioning to prevent skin issues. Problem: Pain:  Goal: Pain level will decrease  Description: Pain level will decrease  Outcome: Ongoing  Note: Pain /discomfort being managed with PRN analgesics per MD orders, heat, rest, emotional support. . Patient able to express presence and absence of pain and rate pain appropriately using numerical scale. Goal: Control of acute pain  Description: Control of acute pain  Outcome: Ongoing  Note: Pain /discomfort being managed with PRN analgesics per MD orders, heat, rest, emotional support. . Patient able to express presence and absence of pain and rate pain appropriately using numerical scale. Goal: Control of chronic pain  Description: Control of chronic pain  Outcome: Ongoing  Note: Pain /discomfort being managed with PRN analgesics per MD orders, heat, rest, emotional support. . Patient able to express presence and absence of pain and rate pain appropriately using numerical scale.

## 2022-02-09 NOTE — PLAN OF CARE
Problem: Skin Integrity:  Goal: Will show no infection signs and symptoms  Description: Will show no infection signs and symptoms  2/9/2022 1159 by Saritha Enriquez RN  Outcome: Ongoing   Pt shows no signs of infection. Will monitor VS.  Problem: Skin Integrity:  Goal: Absence of new skin breakdown  Description: Absence of new skin breakdown  2/9/2022 1159 by Saritha Enrqiuez RN  Outcome: Ongoing   Pt shows no signs of new skin breakdown. Will monitor. Problem: Pain:  Goal: Pain level will decrease  Description: Pain level will decrease  2/9/2022 1159 by Saritha Enriquez RN  Outcome: Ongoing   Pt assessed for pain. Pt in pain and assessed with 0-10 pain rating scale. Pt given prescribed analgesic for pain. (See eMar) Pt satisfied with pain relief thus far. Will reassess and continue to monitor.

## 2022-02-09 NOTE — PROGRESS NOTES
Pt A&Ox4, resting in bed. Up x1 with a walker. Tolerating intake and PO medications whole. C/o pain generalized throughout the body, no medication wanted. Able to make needs known. Call light within reach. Fall precautions in place. Will monitor.  Electronically signed by Wendy Grant RN on 2/9/2022 at 11:58 AM

## 2022-02-09 NOTE — ED NOTES
ED SBAR report provider to 3W Nurse, RN. Patient to be transported to Room 3129 via stretcher by transport tech. Patient transported with bedside cardiac monitor and with IV medications infusing. IV site clean, dry, and intact. MEWS score and pain assessed as 0/10 and documented. Patient's score on the RODRIGUEZ Fall scale reviewed with receiving RN. Updated patient on plan of care.        Fátima Stack RN  02/08/22 9852

## 2022-02-09 NOTE — H&P
Hospital Medicine History & Physical      PCP: RADHA Lockett    Date of Admission: 2/8/2022    Chief Complaint:      History Of Present Illness:  Patient is a 70-year-old female with past medical history of CAD hypertension breast cancer who presents to the hospital for generalized weakness. According to the patient she is not able to take care of herself, she was with her daughter and Jaison Matthews do not want to be a burden on my daughter\" so decided to come to the hospital.  Patient mentions she is not able to take care of herself, she has generalized body aches and joint pain. Patient denied fevers chills nausea vomiting diarrhea constipation dysuria. Past Medical History:          Diagnosis Date    Aortic insufficiency     Mild    Breast cancer (HCC)     Left, chemo and radiation    CAD (coronary artery disease)     Heart attack ([facility] [address] 09/20/2013    HTN (hypertension)     Hx of blood clots     1996 after chemo    SVT (supraventricular tachycardia) ([facility] [address]        Past Surgical History:          Procedure Laterality Date    BREAST SURGERY      CARDIAC SURGERY      STENTS    CATARACT REMOVAL WITH IMPLANT Left 06/04/2018    Dr. Rudy Goode  2013    2 stents    HYSTERECTOMY N/A 07/07/2016    MASTECTOMY Left 1996    SKIN CANCER EXCISION      UPPER GASTROINTESTINAL ENDOSCOPY  08/09/2018    Dr Pattie Lion, biopsies       Medications Prior to Admission:      Prior to Admission medications    Medication Sig Start Date End Date Taking? Authorizing Provider   metoprolol succinate (TOPROL XL) 25 MG extended release tablet Take 25 mg by mouth at bedtime    Yes Historical Provider, MD   vitamin C (ASCORBIC ACID) 500 MG tablet Take 500 mg by mouth 3 times daily (with meals)   Yes Historical Provider, MD   clonazePAM (KLONOPIN) 0.5 MG tablet Take 1/4 tab three times daily.  1/13/22 3/11/22 Yes Oralia Ta MD   lisinopril (PRINIVIL;ZESTRIL) 10 MG tablet Take 1 tablet by mouth daily 11/2/18  Yes Cassidy Holloway MD   amLODIPine (NORVASC) 10 MG tablet Take 10 mg by mouth daily   Yes Historical Provider, MD   nitroGLYCERIN (NITROSTAT) 0.4 MG SL tablet Place 0.4 mg under the tongue every 5 minutes as needed for Chest pain Dissolve 1 tab under tongue at first sign of chest pain. May repeat every 5 minutes until relief is obtained. If pain persists after taking 3 tabs in a 15-minute period, or the pain is different than is typically experienced, call 9-1-1 immediately. Yes Historical Provider, MD   Multiple Vitamin (MULTI VITAMIN DAILY PO) Take 1 tablet by mouth daily    Yes Historical Provider, MD   Vitamin D (CHOLECALCIFEROL) 1000 UNITS CAPS capsule Take 1,000 Units by mouth daily   Yes Historical Provider, MD       Allergies:  Sulfa antibiotics, Trimethoprim, and Bactrim [sulfamethoxazole-trimethoprim]    Social History:      TOBACCO:   reports that she quit smoking about 59 years ago. She has never used smokeless tobacco.  ETOH:   reports no history of alcohol use. Family History:       Reviewed in detail and non contributory          Problem Relation Age of Onset    Heart Disease Mother     Colon Cancer Father        REVIEW OF SYSTEMS:   Pertinent positives as noted in the HPI. All other systems reviewed and negative. PHYSICAL EXAM PERFORMED:    BP (!) 155/82   Pulse 70   Temp 98.8 °F (37.1 °C) (Oral)   Resp 19   Ht 5' 5\" (1.651 m)   Wt 132 lb 15 oz (60.3 kg)   LMP  (LMP Unknown)   SpO2 100%   BMI 22.12 kg/m²     General appearance:  No apparent distress, cooperative. HEENT:  Normal cephalic, atraumatic without obvious deformity. Conjunctivae/corneas clear. Neck: Supple, with full range of motion. No cervical lymphadenopathy  Respiratory:  Normal respiratory effort. Clear to auscultation, bilaterally without Rales/Wheezes/Rhonchi. Cardiovascular:  Regular rate and rhythm with normal S1/S2 without murmurs, rubs or gallops.   Abdomen: Soft, non-tender, non-distended, normal bowel sounds. Musculoskeletal:  No edema noted bilaterally. No tenderness on palpation   Skin: no rash visible  Neurologic:  Neurologically intact without any focal sensory/motor deficits. grossly non-focal.  Psychiatric:  Alert and oriented, normal mood  Peripheral Pulses: +2 palpable, equal bilaterally       Labs:     Recent Labs     02/08/22  1446   WBC 11.7*   HGB 12.1   HCT 37.1   *     Recent Labs     02/08/22  1446      K 4.1      CO2 21   BUN 13   CREATININE 0.5*   CALCIUM 9.1     Recent Labs     02/08/22  1446   AST 18   ALT 8*   BILIDIR <0.2   BILITOT 0.3   ALKPHOS 64     No results for input(s): INR in the last 72 hours. Recent Labs     02/08/22  1446   CKTOTAL 27   TROPONINI <0.01       Urinalysis:      Lab Results   Component Value Date    NITRU Negative 02/08/2022    WBCUA 1 12/22/2019    RBCUA 1 12/22/2019    BLOODU Negative 02/08/2022    SPECGRAV 1.013 02/08/2022    GLUCOSEU Negative 02/08/2022       Radiology:       XR CHEST PORTABLE   Final Result   No acute cardiopulmonary disease. CT Head WO Contrast   Final Result   No acute intracranial abnormality. Atrophy and chronic white matter changes, similar in appearance. Active Hospital Problems    Diagnosis Date Noted    Difficulty in walking [R26.2] 02/08/2022       Patient is a 80-year-old female with past medical history of CAD hypertension breast cancer who presents to the hospital for generalized weakness. According to the patient she is not able to take care of herself, she was with her daughter and Blane Ahr do not want to be a burden on my daughter\" so decided to come to the hospital.  Patient mentions she is not able to take care of herself, she has generalized body aches and joint pain. Patient denied fevers chills nausea vomiting diarrhea constipation dysuria.     Assessment  Generalized body aches, joint pains likely arthritis, rule out polymyalgia rheumatica  Generalized weakness likely secondary to debility  Chronic fatigue syndrome  CAD  Hypertension  Breast cancer    Plan  Consult PT/OT, check CRP, ESR, check COVID-19, rapid flu antigen to rule out viral illness  Resume home medications  DVT prophylaxis Lovenox  Patient mentions Logan Martel does not want to be a burden on her family\" and is interested in getting evaluated by physical therapy  Diet: No diet orders on file  Code Status: Prior    PT/OT Eval Status: ordered    Dispo - pending clinical improvement       Garry Linn MD    The note was completed using EMR and Dragon dictation system. Every effort was made to ensure accuracy; however, inadvertent computerized transcription errors may be present. Thank you RADHA Hernandez for the opportunity to be involved in this patient's care. If you have any questions or concerns please feel free to contact me at [phone].     Garry Linn MD

## 2022-02-09 NOTE — PROGRESS NOTES
PT AAO x4 per this shift. VSS. Pt tolerating diet. Pt able to use steady with nurse assistance to bathroom. Pt requesting to try walker. Pt tolerating walker very well. Pt stating that she has full body joint pain . Warm blanket used to manage pain. Pt r has orders for Clonazepam 0.5 mg daily but her home orders are for 1/4/ of a 0.5 clonazepam TID. She is requesting this be changed. JESSICA Iraheta notified. NNO. Pt requesting for mediation to be changed to night time. Pharmacy and JESSICA Iraheta notified and refused. Pt also requesting stool softener d/t having small hard bowel movements. See eMAR with NO. Pt accepting new orders. Resting fairly throughout this night. No further needs voiced at this time. Fall precautions in place. Bed alarm on. Call light within reach. Will continue to round.  Electronically signed by Evon Costello RN on 2/9/2022 at 4:15 AM

## 2022-02-09 NOTE — DISCHARGE SUMMARY
Hospital Medicine Discharge Summary    Patient ID: Niranjan Nur      Patient's PCP: RADHA Cage    Admit Date: 2/8/2022     Discharge Date:     Admitting Physician: Brielle Meng MD     Discharge Physician: Brielle Meng MD     Discharge Diagnoses: Active Hospital Problems    Diagnosis Date Noted    Difficulty in walking [R26.2] 02/08/2022       The patient was seen and examined on day of discharge and this discharge summary is in conjunction with any daily progress note from day of discharge. Condition at discharge - stable    Hospital Course: patient seen and evaluated on the day of discharge. Patient informed about following up with appointments. Patient verbalized understanding for follow-up appointments. The patient and / or the family were informed of the results of tests, a time was given to answer questions, a plan was proposed and they agreed with plan. Medical reconciliation performed. Patient discharged stable condition. Patient is a 79-year-old female with past medical history of CAD hypertension breast cancer who presents to the hospital for generalized weakness. According to the patient she is not able to take care of herself, she was with her daughter and Matt Corral do not want to be a burden on my daughter\" so decided to come to the hospital.  Patient mentions she is not able to take care of herself, she has generalized body aches and joint pain. Patient denied fevers chills nausea vomiting diarrhea constipation dysuria.     Assessment  Generalized body aches, joint pains likely arthritis, rule out polymyalgia rheumatica  Generalized weakness likely secondary to debility  Chronic fatigue syndrome  CAD  Hypertension  Breast cancer     ESR elevated, concern for PMR and possible fibromyalgia. dc on 10mg prednisone.  Patient did well with PT/OT per scoring chart        Exam:     /72   Pulse 66   Temp 98.3 °F (36.8 °C) (Oral)   Resp 16   Ht 5' 5\" (1.651 m) Wt 126 lb 12.2 oz (57.5 kg)   LMP  (LMP Unknown)   SpO2 96%   BMI 21.09 kg/m²     General appearance: No apparent distress  HEENT:  Conjunctivae/corneas clear. Neck: Supple, No jugular venous distention. Respiratory:  Normal respiratory effort. Clear to auscultation, bilaterally without Rales/Wheezes/Rhonchi. Cardiovascular: Regular rate and rhythm with normal S1/S2 without murmurs, rubs or gallops. Abdomen: Soft, non-tender, non-distended, normal bowel sounds. Musculoskelatal: No clubbing, cyanosis or edema bilaterally. Skin: Skin color, texture, turgor normal.   Neurologic: no focal neurologic deficits. grossly non-focal.  Psychiatric: Alert and oriented, normal mood    Consults:     IP CONSULT TO HOSPITALIST  IP CONSULT TO SPIRITUAL SERVICES  IP CONSULT TO SOCIAL WORK  IP CONSULT TO HOME CARE NEEDS      Code Status:  Full Code    Activity: activity as tolerated    Labs: For convenience and continuity at follow-up the following most recent labs are provided:      CBC:    Lab Results   Component Value Date    WBC 8.7 02/09/2022    HGB 11.2 02/09/2022    HCT 34.0 02/09/2022     02/09/2022       Renal:    Lab Results   Component Value Date     02/09/2022    K 4.4 02/09/2022     02/09/2022    CO2 23 02/09/2022    BUN 14 02/09/2022    CREATININE 0.5 02/09/2022    CALCIUM 9.2 02/09/2022       Discharge Medications:     Current Discharge Medication List           Details   predniSONE (DELTASONE) 10 MG tablet Take 1 tablet by mouth daily for 10 days  Qty: 10 tablet, Refills: 0              Details   metoprolol succinate (TOPROL XL) 25 MG extended release tablet Take 25 mg by mouth at bedtime       vitamin C (ASCORBIC ACID) 500 MG tablet Take 500 mg by mouth 3 times daily (with meals)      clonazePAM (KLONOPIN) 0.5 MG tablet Take 1/4 tab three times daily.   Qty: 30 tablet, Refills: 2    Associated Diagnoses: Anxiety      lisinopril (PRINIVIL;ZESTRIL) 10 MG tablet Take 1 tablet by mouth daily  Qty: 30 tablet, Refills: 3      amLODIPine (NORVASC) 10 MG tablet Take 10 mg by mouth daily      Multiple Vitamin (MULTI VITAMIN DAILY PO) Take 1 tablet by mouth daily       Vitamin D (CHOLECALCIFEROL) 1000 UNITS CAPS capsule Take 1,000 Units by mouth daily      nitroGLYCERIN (NITROSTAT) 0.4 MG SL tablet Place 0.4 mg under the tongue every 5 minutes as needed for Chest pain Dissolve 1 tab under tongue at first sign of chest pain. May repeat every 5 minutes until relief is obtained. If pain persists after taking 3 tabs in a 15-minute period, or the pain is different than is typically experienced, call 9-1-1 immediately. Time Spent on discharge is more than 30 mints in the examination, evaluation, counseling and review of medications and discharge plan. Signed:    Latrell Rivera MD   2/9/2022      Thank you RADHA Gomez for the opportunity to be involved in this patient's care. If you have any questions or concerns please feel free to contact me at [phone].

## 2022-02-09 NOTE — PROGRESS NOTES
Pt resting in bed, quietly. No changes from AM assessment. Able to make needs known. Call light within reach. Fall precautions in place. Will monitor per unit protocol.  Electronically signed by Verónica Solomon RN on 2/9/2022 at 5:37 PM

## 2022-02-09 NOTE — DISCHARGE INSTR - COC
Continuity of Care Form    Patient Name: Dale Gil   :  1934  MRN:  [id number]    Admit date:  2022  Discharge date:  02/10/2022    Code Status Order: Full Code   Advance Directives:      Admitting Physician:  Porfirio Hastings MD  PCP: [facility]    Discharging Nurse: Shannan Rosado RN   [address] Unit/Room#: S8L-0954/3129-01  Discharging Unit Phone Number:[phone]    Emergency Contact:   Extended Emergency Contact Information  Primary Emergency Contact: Duyen Sotomayor [address] Phone: [phone]  Mobile Phone: [phone]  Relation: Child  Secondary Emergency Contact: ElidaBruce marin  Address: [employer]ON IN LAW  Home Phone: [phone]  Relation: Child    Past Surgical History:  Past Surgical History:   Procedure Laterality Date    BREAST SURGERY      CARDIAC SURGERY      STENTS    CATARACT REMOVAL WITH IMPLANT Left 2018    Dr. Lenard Carlson  2013    2 stents    HYSTERECTOMY N/A 2016    MASTECTOMY Left 1996    SKIN CANCER EXCISION      UPPER GASTROINTESTINAL ENDOSCOPY  2018    Dr Vini Vieira, biopsies       Immunization History: There is no immunization history on file for this patient.     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    Neuropathy G62.9    Cataract extraction status of eye, left Z98.42    Cataract extraction status of eye, right Z98.41    Vestibular dizziness H81.90    Chronic pain of right knee M25.561, G89.29    Difficulty in walking R26.2       Isolation/Infection:   Isolation            No Isolation          Patient Infection Status       Infection Onset Added Last Indicated Last Indicated By Review Planned Expiration Resolved Resolved By    None active    Resolved    COVID-19 (Rule Out) 02/08/22 02/08/22 02/08/22 COVID-19, Rapid (Ordered)   02/08/22 Rule-Out Test Resulted            Nurse Assessment:  Last Vital Signs: /72   Pulse 66   Temp 98.3 °F (36.8 °C) (Oral)   Resp 16   Ht 5' 5\" (1.651 m)   Wt 126 lb 12.2 oz (57.5 kg)   LMP  (LMP Unknown)   SpO2 96%   BMI 21.09 kg/m²     Last documented pain score (0-10 scale): Pain Level: 8  Last Weight:   Wt Readings from Last 1 Encounters:   02/09/22 126 lb 12.2 oz (57.5 kg)     Mental Status:  oriented, alert, and forgetful at times     IV Access:  [address] [facility] DEBBIE IV [id number]}    Nursing Mobility/ADLs:  Walking   Assisted  Transfer  Assisted  Bathing  Assisted  Dressing  Assisted  Toileting  Assisted  Feeding  Assisted  Med Admin  Assisted  Med Delivery   whole    Wound Care Documentation and Therapy:        Elimination:  Continence: Bowel: Yes  Bladder: Yes  Urinary Catheter: None   Colostomy/Ileostomy/Ileal Conduit: {YES / [id number]}       Date of Last BM: 02/10/2022 per pt. Intake/Output Summary (Last 24 hours) at 2/9/2022 1137  Last data filed at 2/9/2022 0934  Gross per 24 hour   Intake 600 ml   Output --   Net 600 ml     I/O last 3 completed shifts: In: 360 [P.O.:360]  Out: -     Safety Concerns:     History of Falls (last 30 days) and At Risk for Falls    Impairments/Disabilities:      Vision and Hearing    Nutrition Therapy:  Current Nutrition Therapy:   - Oral Diet:  General    Routes of Feeding: Oral  Liquids:  Thin Liquids  Daily Fluid Restriction: no  Last Modified Barium Swallow with Video (Video Swallowing Test): not done    Treatments at the Time of Hospital Discharge:   Respiratory Treatments: none   Oxygen Therapy:  is not on home oxygen therapy. Ventilator:    - No ventilator support    Rehab Therapies: Physical Therapy and Occupational Therapy, Nurse  Weight Bearing Status/Restrictions: No weight bearing restirctions  Other Medical Equipment (for information only, NOT a DME order):  walker  Other Treatments: none     Patient's personal belongings (please select all that are sent with patient):  Glasses, Dentures upper and lower    RN SIGNATURE:  Electronically signed by Tova Cardenas RN on 2/10/22 at 2:38 PM EST    CASE MANAGEMENT/SOCIAL WORK SECTION    Inpatient Status Date: OBS    Readmission Risk Assessment Score:  Readmission Risk              Risk of Unplanned Readmission:  0           Discharging to Facility/ Agency    [address] [facility]   [address] [facility] [address]     Dialysis Facility (if applicable)   Name:  Address:  Dialysis Schedule:  Phone:  Fax:    / signature: Electronically signed by Racquel Bishop on 2/9/2022 at 11:37 AM     PHYSICIAN SECTION    Prognosis: Good    Condition at Discharge: Stable    Rehab Potential (if transferring to Rehab): Good    Recommended Labs or Other Treatments After Discharge: Outpatient follow-up with rheumatology  Outpatient age-appropriate cancer screening      Physician Certification: I certify the above information and transfer of Rica Hammond  is necessary for the continuing treatment of the diagnosis listed and that she requires Home Care for less 30 days.      Update Admission H&P: No change in H&P    PHYSICIAN SIGNATURE:  Electronically signed by Svetlana Aguila MD on 2/10/22 at 3:17 PM EST

## 2022-02-09 NOTE — PROGRESS NOTES
4 Eyes Skin Assessment     NAME:  Lazaro Parekh  YOB: 1934  MEDICAL RECORD NUMBER:  [id number]    The patient is being assess for  Admission    I agree that 2 RN's have performed a thorough Head to Toe Skin Assessment on the patient. ALL assessment sites listed below have been assessed. Areas assessed by both nurses:    Head, Face, Ears, Shoulders, Back, Chest, Arms, Elbows, Hands, Sacrum. Buttock, Coccyx, Ischium and Legs. Feet and Heels        Does the Patient have a Wound?  No noted wound(s)       Gianni Prevention initiated:  yes  Wound Care Orders initiated:  Yes    Pressure Injury (Stage 3,4, Unstageable, DTI, NWPT, and Complex wounds) if present place consult order under [de-identified] No    New and Established Ostomies if present place consult order under : No      Nurse 1 eSignature: Electronically signed by Arian Caban RN on 2/9/22 at 2:24 AM EST    **SHARE this note so that the co-signing nurse is able to place an eSignature**    Nurse 2 eSignature: Electronically signed by Karma Fleming RN on 2/9/22 at 2:25 AM EST
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
## 2022-02-09 NOTE — CARE COORDINATION
Referral per RN for d/c planning. Chart reviewed; noted patient reports unable to care for self. Spoke with daughter who is interested in placement at [facility] where she works. Referred; bed available and patient accepted for admit pending therapy evals. Later noted per therapy notes patient likely will not be approved for a SNF stay. Discharge order noted at 5:30 from 5:15. Spoke with daughter who prefers official word from insurance so have asked facility to submit and I will try to expedite. Patient lives alone but son is here from [place] staying with her to help her as needed (lthough he is still working from her home). Discussed going to SNF short term under private pay vs home with home health care. Daughter reports patient is being followed by psych monthly via zoom and that they were starting a dementia eval just prior to her admit here.        Will follow up in am.   Electronically signed by Jessica Tipton on 2/9/2022 at 6:10 PM

## 2022-02-09 NOTE — PROGRESS NOTES
Pt arrived to floor from ER at 2108 via stretcher to [address]. Pt oriented to room, call light, policies and procedures, the menu and ordering. Call light within reach. Bed in lowest position, bed alarm on, and wheels locked. Pt verbalized understanding. No complaints, questions or concerns at this time.

## 2022-02-09 NOTE — PROGRESS NOTES
Nutrition Assessment     Type and Reason for Visit: Initial,Positive Nutrition Screen    Nutrition Recommendations/Plan:   No nutrition concerns     Nutrition Assessment:  Positive nutrition screen. Hx breast cancer s/p chemo and radiation. Wt hx stable per EMR. On regular diet with intakes %. Skin intact. No nutrition concerns at this time. Malnutrition Assessment:  Malnutrition Status: No malnutrition     Nutrition Related Findings: +BM 2/9. No edema. Current Nutrition Therapies:    ADULT DIET;  Regular    Anthropometric Measures:  · Height: 5' 5\" (165.1 cm)  · Current Body Wt: 126 lb (57.2 kg)   · BMI: 21    Nutrition Diagnosis:   No nutrition diagnosis at this time    Nutrition Interventions:   Food and/or Nutrient Delivery:  Continue Current Diet  Nutrition Education/Counseling:  Education not indicated   Coordination of Nutrition Care:  Continue to monitor while inpatient    Goals:  PO intake greater than 50%       Nutrition Monitoring and Evaluation:   Behavioral-Environmental Outcomes:  None Identified   Food/Nutrient Intake Outcomes:  Food and Nutrient Intake  Physical Signs/Symptoms Outcomes:  Weight     Discharge Planning:    No discharge needs at this time     Electronically signed by Mary Hughes RD, LD on 2/9/22 at 10:33 AM EST    Contact: [phone]
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
## 2022-02-10 ENCOUNTER — TELEPHONE (OUTPATIENT)
Dept: FAMILY MEDICINE CLINIC | Age: 87
End: 2022-02-10

## 2022-02-10 VITALS
HEART RATE: 65 BPM | DIASTOLIC BLOOD PRESSURE: 74 MMHG | BODY MASS INDEX: 21.27 KG/M2 | RESPIRATION RATE: 17 BRPM | SYSTOLIC BLOOD PRESSURE: 136 MMHG | OXYGEN SATURATION: 96 % | TEMPERATURE: 98.6 F | WEIGHT: 127.65 LBS | HEIGHT: 65 IN

## 2022-02-10 LAB
ANION GAP SERPL CALCULATED.3IONS-SCNC: 13 MMOL/L (ref 3–16)
BUN BLDV-MCNC: 18 MG/DL (ref 7–20)
CALCIUM SERPL-MCNC: 9.3 MG/DL (ref 8.3–10.6)
CHLORIDE BLD-SCNC: 105 MMOL/L (ref 99–110)
CO2: 23 MMOL/L (ref 21–32)
CREAT SERPL-MCNC: 0.6 MG/DL (ref 0.6–1.2)
GFR AFRICAN AMERICAN: >60
GFR NON-AFRICAN AMERICAN: >60
GLUCOSE BLD-MCNC: 96 MG/DL (ref 70–99)
HCT VFR BLD CALC: 31.9 % (ref 36–48)
HEMOGLOBIN: 10.8 G/DL (ref 12–16)
MCH RBC QN AUTO: 32.4 PG (ref 26–34)
MCHC RBC AUTO-ENTMCNC: 33.9 G/DL (ref 31–36)
MCV RBC AUTO: 95.4 FL (ref 80–100)
PDW BLD-RTO: 13.6 % (ref 12.4–15.4)
PLATELET # BLD: 511 K/UL (ref 135–450)
PMV BLD AUTO: 6.8 FL (ref 5–10.5)
POTASSIUM REFLEX MAGNESIUM: 4.2 MMOL/L (ref 3.5–5.1)
RBC # BLD: 3.35 M/UL (ref 4–5.2)
SODIUM BLD-SCNC: 141 MMOL/L (ref 136–145)
TOTAL CK: 15 U/L (ref 26–192)
WBC # BLD: 9.1 K/UL (ref 4–11)

## 2022-02-10 PROCEDURE — 96372 THER/PROPH/DIAG INJ SC/IM: CPT

## 2022-02-10 PROCEDURE — 6360000002 HC RX W HCPCS: Performed by: INTERNAL MEDICINE

## 2022-02-10 PROCEDURE — 82550 ASSAY OF CK (CPK): CPT

## 2022-02-10 PROCEDURE — 36415 COLL VENOUS BLD VENIPUNCTURE: CPT

## 2022-02-10 PROCEDURE — 94760 N-INVAS EAR/PLS OXIMETRY 1: CPT

## 2022-02-10 PROCEDURE — 6370000000 HC RX 637 (ALT 250 FOR IP): Performed by: PHYSICIAN ASSISTANT

## 2022-02-10 PROCEDURE — G0378 HOSPITAL OBSERVATION PER HR: HCPCS

## 2022-02-10 PROCEDURE — 6370000000 HC RX 637 (ALT 250 FOR IP): Performed by: NURSE PRACTITIONER

## 2022-02-10 PROCEDURE — 6370000000 HC RX 637 (ALT 250 FOR IP): Performed by: INTERNAL MEDICINE

## 2022-02-10 PROCEDURE — 80048 BASIC METABOLIC PNL TOTAL CA: CPT

## 2022-02-10 PROCEDURE — 85027 COMPLETE CBC AUTOMATED: CPT

## 2022-02-10 RX ADMIN — ENOXAPARIN SODIUM 40 MG: 100 INJECTION SUBCUTANEOUS at 08:52

## 2022-02-10 RX ADMIN — PREDNISONE 10 MG: 5 TABLET ORAL at 08:52

## 2022-02-10 RX ADMIN — LISINOPRIL 10 MG: 10 TABLET ORAL at 08:52

## 2022-02-10 RX ADMIN — GABAPENTIN 100 MG: 100 CAPSULE ORAL at 08:52

## 2022-02-10 RX ADMIN — AMLODIPINE BESYLATE 10 MG: 5 TABLET ORAL at 08:52

## 2022-02-10 RX ADMIN — ACETAMINOPHEN 650 MG: 325 TABLET ORAL at 08:52

## 2022-02-10 ASSESSMENT — PAIN DESCRIPTION - ORIENTATION: ORIENTATION: LEFT

## 2022-02-10 ASSESSMENT — PAIN DESCRIPTION - DESCRIPTORS: DESCRIPTORS: ACHING;DISCOMFORT;OTHER (COMMENT)

## 2022-02-10 ASSESSMENT — PAIN DESCRIPTION - LOCATION: LOCATION: ARM

## 2022-02-10 ASSESSMENT — PAIN SCALES - GENERAL
PAINLEVEL_OUTOF10: 2
PAINLEVEL_OUTOF10: 6

## 2022-02-10 ASSESSMENT — PAIN DESCRIPTION - PAIN TYPE: TYPE: ACUTE PAIN;CHRONIC PAIN

## 2022-02-10 NOTE — TELEPHONE ENCOUNTER
Spoke with carloz, she feels like pt needs something for depression and wants genesight testing. Advised can do testing Thursday at [facility]. Also asked if she could come to [facility] with pt. She is going to try.

## 2022-02-10 NOTE — TELEPHONE ENCOUNTER
Pt rocio batista called and said her mother  was admitted to the hospital and the nurses are telling the daughter that the pts suffering from muscle aches and joint pain due to depression.   Daughter would like some medication prescribed for the pt.      pls call to advise     [phone]

## 2022-02-10 NOTE — CARE COORDINATION
[facility]    Referral received from CM to follow for home care services. [facility] unable to staff timely, will require alternate agency, has no preference.  aware. Spoke with [employer] at [facility], referral accepted, they will pull from [employer].       Marisa Sesay RN, BSN CTN  [facility] [phone]

## 2022-02-10 NOTE — CARE COORDINATION
DISCHARGE SUMMARY     DATE OF DISCHARGE: 2/10/22    DISCHARGE DESTINATION: Home       HOME CARE AGENCY: [address] [facility]             PHONE NUMBER: [phone]              DME ORDERED: NA      TRANSPORTATION: Private car / daughter      COMMENTS:No word back from insurance re: SNF stay. Patient will d/c to daughters home until son returns from [place] on Sunday. [facility] HC set up to begin at patients home on Monday 2/14. Patient and daughter aware and in agreement with plan.

## 2022-02-10 NOTE — PROGRESS NOTES
Data- discharge order received, pt verbalized agreement to discharge, [facility]  for Ot/PT / VN  , DEBBIE reviewed and signed by MD, to be completed by RN. Action- AVS prepared, discharge instructions prepared and given to pt. And her daughter , medication information packet given r/t NEW or CHANGED prescriptions, pt verbalized understanding further self-review. D/C instruction summary: Diet- regular , Activity- up with assistance , follow up with Primary Care Physician RADHA Cramer [phone] appointment advised to make appointment in 5-7 days , immunizations reviewed and up to date , medications prescriptions to be filled at [facility]. Inpatient treatment reviewed . Contact information provided to above agencies used. Response- Case Management/ reported faxing completed DEBBIE and AVS to needed HHC/DME services stated above. Pt belongings gathered, IV removed, pt dressed with assistance . Disposition is home with HHC/DME as stated above, transported with daughter , taken to lobby via w/c with daughter , no complications.

## 2022-02-10 NOTE — PROGRESS NOTES
Hospitalist Progress Note    Patient:  Harika Hart  Unit/Bed:K2G-8285/3129-01   YOB: 1934       MRN: [id number] Acct: [de-identified]  PCP: RADHA Cerna    Date of Admission: 2/8/2022  --------------------------    Chief Complaint:     Arthralgia    Hospital Course: Harika Hart is a 80 y.o. female hospitalized on 2/8/2022   arthralgia, muscle pain. .        Assessment/plan:     Generalized body ache, joint pain. With nonspecific findings including elevated ESR, anemia, thrombocytosis. Ruling out inflammatory etiology.  -Empirically started by prior hospitalist on prednisone, strongly encouraged the patient to follow-up with rheumatology for further work-up. Outpatient age-appropriate cancer screening discussed. CK level, TSH within acceptable range. Anxiety disorder  Coronary artery disease  Essential hypertension  Breast cancer    PLAN   awaiting acceptance at [facility] versus home with home care. Outpatient follow-up with rheumatology and oncology. Code Status: Full Code         DVT prophylaxis: Lovenox     Disposition: Patient discharge, awaiting arrangement for ECF versus home with home health care. I discussed my thought processes at length with patient/family and patient understood. Question and concerns  Addressed       Discussed with RN      ----------------      Subjective:     Patient seen and examined  Overnight events noted  RN and ancillary staff note reviewed    Report arthralgia, no nausea vomiting, denied weight loss. No headache, no shortness of breath or chest pain. Denied urinary symptoms. Diet: ADULT DIET; Regular    OBJECTIVE     Exam:  /74   Pulse 65   Temp 98.6 °F (37 °C) (Oral)   Resp 17   Ht 5' 5\" (1.651 m)   Wt 127 lb 10.3 oz (57.9 kg)   LMP  (LMP Unknown)   SpO2 96%   BMI 21.24 kg/m²            Gen: Not in distress. Alert. No skin rash  Head: Normocephalic. Atraumatic. Eyes: Conjunctivae/corneas clear.   ENT: Oral mucosa moist  Neck: No JVD. No obvious thyromegaly. CVS: Nml S1S2, no murmur   , RRR  Pulmomary: Clear bilaterally. No crackles. No wheezes. Gastrointestinal: Soft, non tender, non distend, . Musculoskeletal: No edema. Warm, no joint swelling noted. Neuro: No focal deficit. Moves extremity spontaneously. Psychiatry: Appropriate affect. Not agitated. Medications:  Reviewed    Infusion Medications    sodium chloride       Scheduled Medications    gabapentin  100 mg Oral BID    predniSONE  10 mg Oral Daily    sodium chloride flush  10 mL IntraVENous 2 times per day    enoxaparin  40 mg SubCUTAneous Daily    amLODIPine  10 mg Oral Daily    clonazePAM  0.5 mg Oral Daily    lisinopril  10 mg Oral Daily    metoprolol succinate  25 mg Oral Nightly    senna  1 tablet Oral BID     PRN Meds: sodium chloride flush, sodium chloride, potassium chloride **OR** potassium alternative oral replacement **OR** potassium chloride, magnesium sulfate, promethazine **OR** ondansetron, magnesium hydroxide, acetaminophen **OR** acetaminophen      Intake/Output Summary (Last 24 hours) at 2/10/2022 1400  Last data filed at 2/10/2022 0617  Gross per 24 hour   Intake 420 ml   Output --   Net 420 ml             Labs:   Recent Labs     02/08/22  1446 02/09/22  0555 02/10/22  0644   WBC 11.7* 8.7 9.1   HGB 12.1 11.2* 10.8*   HCT 37.1 34.0* 31.9*   * 463* 511*     Recent Labs     02/08/22  1446 02/09/22  0555 02/10/22  0644    138 141   K 4.1 4.4 4.2    103 105   CO2 21 23 23   BUN 13 14 18   CREATININE 0.5* 0.5* 0.6   CALCIUM 9.1 9.2 9.3     Recent Labs     02/08/22  1446   AST 18   ALT 8*   BILIDIR <0.2   BILITOT 0.3   ALKPHOS 64     No results for input(s): INR in the last 72 hours.   Recent Labs     02/08/22  1446 02/10/22  0644   CKTOTAL 27 15*   TROPONINI <0.01  --        Urinalysis:      Lab Results   Component Value Date    NITRU Negative 02/08/2022    WBCUA 1 12/22/2019    RBCUA 1 12/22/2019    BLOODU Negative 02/08/2022    SPECGRAV 1.013 02/08/2022    GLUCOSEU Negative 02/08/2022       Radiology:  XR CHEST PORTABLE   Final Result   No acute cardiopulmonary disease. CT Head WO Contrast   Final Result   No acute intracranial abnormality. Atrophy and chronic white matter changes, similar in appearance.                      Electronically signed by Gonzalo Samayoa MD on 2/10/2022 at 2:00 PM

## 2022-02-10 NOTE — PLAN OF CARE
----- Message from Arleth Erwin sent at 11/27/2019  2:40 PM CST -----  Can someone please add an order for an Echo, the hospital does not have a current order. Thank you.    ----- Message -----  From: Emelia Gomez  Sent: 11/27/2019  12:16 PM CST  To: Arleth Erwin    Pt has to have an echo before her next chemo. Pt wants to know if you could call to gabe this for her. Mondays are better for her.     # [phone]  [phone]      Problem: Skin Integrity:  Goal: Will show no infection signs and symptoms  Description: Will show no infection signs and symptoms  2/10/2022 0141 by Mae Longoria RN  Outcome: Ongoing  Note: Patient shows no signs or symptoms of urinary tract infection at this time. Will continue to monitor throughout shift. Problem: Skin Integrity:  Goal: Absence of new skin breakdown  Description: Absence of new skin breakdown  2/10/2022 0141 by Mae Longoria RN  Outcome: Ongoing  Note: Skin assessment complete. No new signs of skin breakdown noted. Assistance provided with repositioning while in bed. Problem: Pain:  Goal: Pain level will decrease  Description: Pain level will decrease  2/10/2022 0141 by Mae Longoria RN  Outcome: Ongoing  Note: Pt educated to attempt non-phagological method of pain control, but it it becomes too strong use PRN analgesics. Pain and discomfort being managed PRN analgesics per MD orders. Pt able to express presence of pain. Problem: Pain:  Goal: Control of acute pain  Description: Control of acute pain  2/10/2022 0141 by Mae Longoria RN  Outcome: Ongoing  Note: Patient educated on acute pain. Taught patient to use call light to ask for pain medication. PRN pain medication given for acute pain. Will continue to monitor pain per unit protocol. Problem: Pain:  Goal: Control of chronic pain  Description: Control of chronic pain  2/10/2022 0141 by Mae Longoria RN  Outcome: Ongoing  Note: Patient educated on chronic pain. Taught patient to use call light to ask for pain medication. PRN pain medication given for chronic pain. Will continue to monitor pain per unit protocol.

## 2022-02-17 ENCOUNTER — OFFICE VISIT (OUTPATIENT)
Dept: PSYCHIATRY | Age: 87
End: 2022-02-17
Payer: MEDICARE

## 2022-02-17 VITALS
BODY MASS INDEX: 20.49 KG/M2 | OXYGEN SATURATION: 99 % | DIASTOLIC BLOOD PRESSURE: 74 MMHG | HEART RATE: 89 BPM | HEIGHT: 65 IN | WEIGHT: 123 LBS | SYSTOLIC BLOOD PRESSURE: 133 MMHG

## 2022-02-17 DIAGNOSIS — F41.9 ANXIETY: ICD-10-CM

## 2022-02-17 PROCEDURE — 99214 OFFICE O/P EST MOD 30 MIN: CPT | Performed by: PSYCHIATRY & NEUROLOGY

## 2022-02-17 RX ORDER — DULOXETIN HYDROCHLORIDE 30 MG/1
30 CAPSULE, DELAYED RELEASE ORAL DAILY
Qty: 30 CAPSULE | Refills: 3 | Status: SHIPPED | OUTPATIENT
Start: 2022-02-17 | End: 2022-03-15 | Stop reason: SDUPTHER

## 2022-02-17 RX ORDER — CLONAZEPAM 0.5 MG/1
TABLET ORAL
Qty: 30 TABLET | Refills: 2 | Status: SHIPPED | OUTPATIENT
Start: 2022-02-17 | End: 2022-03-15 | Stop reason: SDUPTHER

## 2022-02-17 NOTE — PROGRESS NOTES
UNITS CAPS capsule Take 1,000 Units by mouth daily       No current facility-administered medications for this visit. PET Scan 12/16/21 =>  EXAM: NM PET BRAIN METAB WITH FDG     INDICATION: Dementia     RADIOPHARMACEUTICAL: 11.84 mCi F18-FDG IV     TECHNICAL: Positron emission tomography with concurrently acquired low dose noncontrast computed tomography was performed through the brain.  Limited CT imaging is done for attenuation correction and localization purposes. It is limited by lower dose. No IV contrast was administered. Injection site, right antecubital fossa. Uptake time: 56 minutes     COMPARISON: None     FINDINGS:     Fingerstick glucose at the time of radiopharmaceutical injection was 107 mg/dL. Registration: Acceptable. Brain PET images demonstrate symmetric and preserved FDG uptake within the cerebral hemispheres. There is normal FDG uptake within the cingulate gyrus and precuneus. There is normal, symmetric FDG uptake within the basal ganglia and thalami. There is symmetric uptake within the cerebellum. CT images demonstrate diffuse cortical atrophy, ventricular enlargement, and multiple hypodense foci in the periventricular white matter. ROS:  No tremor, gait nl    MSE:    A-casually dressed, good eC, pleasant and engageable  A-full  M-depressed, anxious  S-grossly A + O  I/J-fair/fair  T-linear, goal-directed.  Speech c nl r/t/v/a.  No S/H I, no a/vh.       A/P 86 y/o F c      1. OCPD traits, anxiety NOS-Worse.   We'll try a bit of cymbalta 30, get genesight at pt's request.  Since klonopin 0.25 - 0.5 tid prn.  Sees Cards  [id number].   Sees a therapist Huyen Brennan for anxiety centered around body.       2.  Memory trouble-Seen by Dr. Alejandra Mathias, testing c/w mild cog impairment.       Has been on prozac, zoloft, effexor, klonopin, xanax, lexapro, paxil.       I have spent >25 mins with this patient on working on coping skills and med mgt, and > 1/2 of that time was spent counseling this pt.

## 2022-03-14 ENCOUNTER — TELEPHONE (OUTPATIENT)
Dept: FAMILY MEDICINE CLINIC | Age: 87
End: 2022-03-14

## 2022-03-14 NOTE — TELEPHONE ENCOUNTER
Pt called to let Dr Mark Christian know that she can not find a ride and need to do it over the phone. Please give pt a call to let her know that this is okay.  [phone]

## 2022-03-15 ENCOUNTER — TELEMEDICINE (OUTPATIENT)
Dept: PSYCHIATRY | Age: 87
End: 2022-03-15

## 2022-03-15 DIAGNOSIS — F41.9 ANXIETY: ICD-10-CM

## 2022-03-15 PROCEDURE — 99999 PR OFFICE/OUTPT VISIT,PROCEDURE ONLY: CPT | Performed by: PSYCHIATRY & NEUROLOGY

## 2022-03-15 RX ORDER — CLONAZEPAM 0.5 MG/1
TABLET ORAL
Qty: 30 TABLET | Refills: 2 | Status: SHIPPED | OUTPATIENT
Start: 2022-04-15 | End: 2022-07-11

## 2022-03-15 RX ORDER — DULOXETIN HYDROCHLORIDE 30 MG/1
30 CAPSULE, DELAYED RELEASE ORAL DAILY
Qty: 90 CAPSULE | Refills: 1 | Status: SHIPPED | OUTPATIENT
Start: 2022-03-15

## 2022-03-15 NOTE — PROGRESS NOTES
Psych Follow Up Progress Note    3/15/22  Live Rodriguez  [id number]    I have reviewed recent documentation:  Live Rodriguez is a 80 y.o. female  This patient  has a past medical history of Aortic insufficiency, Breast cancer ([facility] [address] CAD (coronary artery disease), Heart attack ([facility] [address] HTN (hypertension), Hx of blood clots, and SVT (supraventricular tachycardia) ([facility] [address] Chief Complaint   Patient presents with    Anxiety     I called this pt today because she could not come in, but needed to have an appt. Her location, her home in [place], [place]. My location, my office in [place], [place]. Subjective/Interval Hx:  Sadly pt's sister . It's obviously been quite difficult. Pt is pleased c cymbalta, and is even getting a little pain relief from it. Getting prednisone too, as she's been dx'd c polymyalgia rheumatica. Charisma Enriquez is now back from [place]. And his job is great. Also klonopin has been quite helpful for sleep. No trouble with meds. Would like to go back to [employer]rian[employer], but it's hard to get dressed. Location:  Mind  Severity:  Mild-mod  Context:  As above. Modifiers:  cymbalta seems to help. Quality:  Anxiety    Objective:    No results found for this or any previous visit (from the past 168 hour(s)). Current Outpatient Medications   Medication Sig Dispense Refill    DULoxetine (CYMBALTA) 30 MG extended release capsule Take 1 capsule by mouth daily 30 capsule 3    clonazePAM (KLONOPIN) 0.5 MG tablet Take 1/4 - 1/5 tab three times daily.  30 tablet 2    metoprolol succinate (TOPROL XL) 25 MG extended release tablet Take 25 mg by mouth at bedtime       vitamin C (ASCORBIC ACID) 500 MG tablet Take 500 mg by mouth 3 times daily (with meals)      lisinopril (PRINIVIL;ZESTRIL) 10 MG tablet Take 1 tablet by mouth daily 30 tablet 3    amLODIPine (NORVASC) 10 MG tablet Take 10 mg by mouth daily      nitroGLYCERIN (NITROSTAT) 0.4 MG SL tablet Place 0.4 mg under the tongue every 5 minutes as needed for Chest pain Dissolve 1 tab under tongue at first sign of chest pain. May repeat every 5 minutes until relief is obtained. If pain persists after taking 3 tabs in a 15-minute period, or the pain is different than is typically experienced, call 9-1-1 immediately.  Multiple Vitamin (MULTI VITAMIN DAILY PO) Take 1 tablet by mouth daily       Vitamin D (CHOLECALCIFEROL) 1000 UNITS CAPS capsule Take 1,000 Units by mouth daily       No current facility-administered medications for this visit. Controlled Substance Monitoring:    Acute and Chronic Pain Monitoring:   RX Monitoring 3/15/2022   Attestation -   Periodic Controlled Substance Monitoring No signs of potential drug abuse or diversion identified. A/P 88 y/o F c      1. OCPD traits, anxiety NOS-Maybe a little better, but also grieving. Cont cymbalta 30 qd. Since klonopin 0.25 - 0.5 tid prn.  Sees Cards  [id number].   And soon Dr. Amita Menedz, the rheumatologist.  [place] a therapist Cristiana Valencia for anxiety centered around body.       2.  Memory trouble-Seen by Dr. Ramonita Badillo, testing c/w mild cog impairment.       Has been on prozac, zoloft, effexor, klonopin, xanax, lexapro, paxil.     I have spent >25 mins with this patient on working on coping skills and med mgt, and > 1/2 of that time was spent counseling this pt.

## 2023-10-17 ENCOUNTER — HOSPITAL ENCOUNTER (OUTPATIENT)
Dept: PHYSICAL THERAPY | Age: 88
Setting detail: THERAPIES SERIES
Discharge: HOME | End: 2023-10-17
Payer: MEDICARE

## 2023-10-17 PROCEDURE — 97016 VASOPNEUMATIC DEVICE THERAPY: CPT

## 2023-10-17 PROCEDURE — 97161 PT EVAL LOW COMPLEX 20 MIN: CPT

## 2023-10-17 PROCEDURE — 97530 THERAPEUTIC ACTIVITIES: CPT

## 2023-10-17 NOTE — FLOWSHEET NOTE
and/or LS spine soft tissue/joints for the purpose of modulating pain, promoting relaxation,  increasing ROM, reducing/eliminating soft tissue swelling/inflammation/restriction, improving soft tissue extensibility and allowing for proper ROM for normal function with self care, mobility, lifting and ambulation. Charges:  Timed Code Treatment Minutes: 45   Total Treatment Minutes: 90      [x] EVAL (LOW) 15176 (typically 20 minutes face-to-face)  [] EVAL (MOD) 05399 (typically 30 minutes face-to-face)  [] EVAL (HIGH) 33850 (typically 45 minutes face-to-face)  [] RE-EVAL     [] ZK(45576) x     [] Dry needle 1 or 2 Muscles (98897)  [] NMR (29950) x     [] Dry needle 3+ Muscles (42767)  [] Manual (60704) x     [] Ultrasound (30453) x  [x] TA (69041) x   2  [] Mech Traction (41295)  [] ES(attended) (37462)     [] ES (un) (05471):   [x] Vasopump (57721) [] Ionto (09507)   [] Other:    ASSESSMENT: -  pt with 3-4 week history of LUE swelling, sudden onset, not relieved with self MLD or elevation. Pt here for management of lymphedema - will benefit from pump and compression garments. GOALS:  Patient stated goal: to get rid of the swelling. [] Progressing: [] Met: [] Not Met: [] Adjusted     Therapist goals for Patient:   Short Term Goals: To be achieved in: 2 weeks  1. Independent in HEP and progression per patient tolerance, in order to prevent return of swelling   [] Progressing: [] Met: [] Not Met: [] Adjusted  2. Patient will have a decrease in swelling/pain to facilitate improvement in movement, function, and ADLs as indicated by improvement with LLIS. [] Progressing: [] Met: [] Not Met: [] Adjusted     Long Term Goals: To be achieved in: 4 weeks  1. Decrease LLIS functional outcome score from 17 to 15 to assist with reaching prior level of function. [] Progressing: [] Met: [] Not Met: [] Adjusted  2.  Patient will demonstrate increased AROM/strength of LUE to [facility] so that pt can resume normal ADL  without

## 2023-10-20 ENCOUNTER — HOSPITAL ENCOUNTER (OUTPATIENT)
Dept: PHYSICAL THERAPY | Age: 88
Setting detail: THERAPIES SERIES
Discharge: HOME | End: 2023-10-20
Payer: MEDICARE

## 2023-10-20 PROCEDURE — 97016 VASOPNEUMATIC DEVICE THERAPY: CPT

## 2023-10-20 PROCEDURE — 97530 THERAPEUTIC ACTIVITIES: CPT

## 2023-10-20 NOTE — FLOWSHEET NOTE
[address] [place] [address] and Therapy, [address], [address] [facility]  Phone: [phone]   Fax:     [phone]      Physical Therapy Treatment Note/ Progress Report:     Date:  10/20/2023    Patient Name:  Sanam Driscoll    :  10/16/1934  MRN: [id number]    Pertinent Medical History:Additional Pertinent Hx: Breast cancer ([address])  Left, chemo and radiation   CAD (coronary artery disease)     HTN (hypertension)     Hx of blood clots   after chemo     Other Medical History      Diagnosis Date Comment Source  Aortic insufficiency  Mild   Heart attack ([address]) 2013    SVT (supraventricular tachycardia)    Medical/Treatment Diagnosis Information:  Medical Diagnosis: Lymphedema, not elsewhere classified [I89.0]  Treatment Diagnosis: Decreased functional mobility 2/2 LUE  lymphedema. Insurance/Certification information:  PT Insurance Information: [facility] Medicare  Physician Information:  Ty Sheriff, *  Plan of care signed (Y/N): faxed    Date of Patient follow up with Physician:      Progress Report: []  Yes  [x]  No     Date Range for reporting period:  Beginning:    Ending:      Progress report due (10 Rx/or 30 days whichever is less):        Visit # POC/Insurance Allowable Auth Needed   2 /  [place]     [x]Yes   []No     Latex Allergy:  [x]NO      []YES  Preferred Language for Healthcare:   [x]English       []Other:    Relevant Medical History:Additional Pertinent Hx: Breast cancer ([address])  Left, chemo and radiation   CAD (coronary artery disease)     HTN (hypertension)     Hx of blood clots   after chemo     Other Medical History      Diagnosis Date Comment Source  Aortic insufficiency  Mild   Heart attack ([address]) 2013    SVT (supraventricular tachycardia)      Breast left mastectomy, and reconstruction  - [address] LN removed. No problems since then.    SUBJECTIVE: New onset of LUE swelling, not

## 2023-10-24 ENCOUNTER — HOSPITAL ENCOUNTER (OUTPATIENT)
Dept: PHYSICAL THERAPY | Age: 88
Setting detail: THERAPIES SERIES
Discharge: HOME | End: 2023-10-24
Payer: MEDICARE

## 2023-10-24 PROCEDURE — 97530 THERAPEUTIC ACTIVITIES: CPT

## 2023-10-24 PROCEDURE — 97016 VASOPNEUMATIC DEVICE THERAPY: CPT

## 2023-10-24 NOTE — FLOWSHEET NOTE
[address] [facility] and Therapy, [facility]  [address] [facility], [address] [facility]  Phone: [phone]   Fax:     [phone]      Physical Therapy Treatment Note/ Progress Report:     Date:  10/24/2023    Patient Name:  Zainab Poster    :  10/16/1934  MRN: [id number]    Pertinent Medical History:Additional Pertinent Hx: Breast cancer ([address])  Left, chemo and radiation   CAD (coronary artery disease)     HTN (hypertension)     Hx of blood clots   after chemo     Other Medical History      Diagnosis Date Comment Source  Aortic insufficiency  Mild   Heart attack ([address]) 2013    SVT (supraventricular tachycardia)    Medical/Treatment Diagnosis Information:  Medical Diagnosis: Lymphedema, not elsewhere classified [I89.0]  Treatment Diagnosis: Decreased functional mobility 2/2 LUE  lymphedema. Insurance/Certification information:  PT Insurance Information: [facility] Medicare  Physician Information:  Hiren Arroyo, *  Plan of care signed (Y/N):yes    Date of Patient follow up with Physician:      Progress Report: []  Yes  [x]  No     Date Range for reporting period:  Beginning:    Ending:      Progress report due (10 Rx/or 30 days whichever is less):        Visit # POC/Insurance Allowable Auth Needed   3 /  [place]     [x]Yes   []No     Latex Allergy:  [x]NO      []YES  Preferred Language for Healthcare:   [x]English       []Other:    Relevant Medical History:Additional Pertinent Hx: Breast cancer ([address])  Left, chemo and radiation   CAD (coronary artery disease)     HTN (hypertension)     Hx of blood clots   after chemo     Other Medical History      Diagnosis Date Comment Source  Aortic insufficiency  Mild   Heart attack ([address]) 2013    SVT (supraventricular tachycardia)      Breast left mastectomy, and reconstruction  - [address] LN removed. No problems since then.    SUBJECTIVE: New onset of LUE swelling, not

## 2023-10-27 ENCOUNTER — HOSPITAL ENCOUNTER (OUTPATIENT)
Dept: PHYSICAL THERAPY | Age: 88
Setting detail: THERAPIES SERIES
Discharge: HOME | End: 2023-10-27
Payer: MEDICARE

## 2023-10-27 PROCEDURE — 97530 THERAPEUTIC ACTIVITIES: CPT

## 2023-10-27 NOTE — FLOWSHEET NOTE
relieved with elevation or self MLD. Pt reports onset 3-4 weeks ago and has been working with self MLD and elevation since that time as well as exercise  10/23 -  Less thickness of edema in forearm noted. Pt reported has been wearing a modified compression sock on her arm - appt at [facility] pending. Pitting   [] none           [] slightly        [x] moderate    [] severe        [] brawny (does not indent)     Edema Rebound*   [] quick           [x] slow            [] fibrotic tissue  *pressure applied x10 seconds    RESTRICTIONS/PRECAUTIONS: lymphedema precautions. Upper extremity Right (cm) Left  (cm)   Date 10/17 10/17 pre               post pump           MCP 19.75 20                               20           wrist 16 18.5                             18             +10 16.5 22.5                              21.5           elbow 22 28                                   28           +10 21.5 28                                     27           axilla 24.5 29                                       28                   Total Girth 120.25 146       Theract - 15-   Reviewed compression sleeve and glove with patient -  Pt has not been wearing the gauntlet glove -  refused the glove as she uses her left hand in the kitchen all the time and gets water on it. Recommended she get used to the gauntlet glove and sleeve and use a latex glove to make it waterproof. Concerned about finger swelling - but pt wants to get used to gauntlet first.  Vinnie Vivas at [facility] to check into coverage for sleeve and gauntlet. Given written instructions for followthrough. Will contact pt weekly. [employer] vasopump festus. Terrell Kaur reviewed use and coverage with pt and daughter       Other Therapeutic Activities: Pt was educated on PT POC, Diagnosis, Prognosis, pathomechanics as well as frequency and duration of scheduling future physical therapy appointments.  Time was also taken on this day to answer all patient questions and

## 2023-10-31 ENCOUNTER — HOSPITAL ENCOUNTER (OUTPATIENT)
Dept: PHYSICAL THERAPY | Age: 88
Setting detail: THERAPIES SERIES
End: 2023-10-31
Payer: MEDICARE

## 2024-04-22 ENCOUNTER — APPOINTMENT (OUTPATIENT)
Dept: GENERAL RADIOLOGY | Age: 89
DRG: 522 | End: 2024-04-22
Payer: MEDICARE

## 2024-04-22 ENCOUNTER — HOSPITAL ENCOUNTER (INPATIENT)
Age: 89
LOS: 3 days | Discharge: NURSING, REHAB OR LONG-TERM CARE | DRG: 522 | End: 2024-04-25
Attending: EMERGENCY MEDICINE | Admitting: STUDENT IN AN ORGANIZED HEALTH CARE EDUCATION/TRAINING PROGRAM
Payer: MEDICARE

## 2024-04-22 ENCOUNTER — PREP FOR PROCEDURE (OUTPATIENT)
Dept: ORTHOPEDIC SURGERY | Age: 89
End: 2024-04-22

## 2024-04-22 DIAGNOSIS — S72.002A CLOSED FRACTURE OF NECK OF LEFT FEMUR, INITIAL ENCOUNTER (HCC): ICD-10-CM
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
## 2024-04-22 PROBLEM — M25.552 HIP PAIN, ACUTE, LEFT: Status: ACTIVE | Noted: 2024-04-22

## 2024-04-22 LAB
ABO + RH BLD: NORMAL
ANION GAP SERPL CALCULATED.3IONS-SCNC: 15 MMOL/L (ref 3–16)
BASOPHILS # BLD: 0.1 K/UL (ref 0–0.2)
BASOPHILS NFR BLD: 0.5 %
BLD GP AB SCN SERPL QL: NORMAL
BLOOD GROUP ANTIBODIES SERPL: NORMAL
BUN SERPL-MCNC: 12 MG/DL (ref 7–20)
CALCIUM SERPL-MCNC: 9.3 MG/DL (ref 8.3–10.6)
CHLORIDE SERPL-SCNC: 101 MMOL/L (ref 99–110)
CO2 SERPL-SCNC: 22 MMOL/L (ref 21–32)
CREAT SERPL-MCNC: 0.7 MG/DL (ref 0.6–1.2)
DAT IGG CAPTURE: NORMAL
DEPRECATED RDW RBC AUTO: 14.4 % (ref 12.4–15.4)
EOSINOPHIL # BLD: 0.1 K/UL (ref 0–0.6)
EOSINOPHIL NFR BLD: 1.6 %
GFR SERPLBLD CREATININE-BSD FMLA CKD-EPI: 82 ML/MIN/{1.73_M2}
GLUCOSE SERPL-MCNC: 116 MG/DL (ref 70–99)
HCT VFR BLD AUTO: 42.8 % (ref 36–48)
HGB BLD-MCNC: 14.4 G/DL (ref 12–16)
INR PPP: 1.07 (ref 0.85–1.15)
LYMPHOCYTES # BLD: 1.3 K/UL (ref 1–5.1)
LYMPHOCYTES NFR BLD: 13.8 %
MCH RBC QN AUTO: 31.8 PG (ref 26–34)
MCHC RBC AUTO-ENTMCNC: 33.6 G/DL (ref 31–36)
MCV RBC AUTO: 94.7 FL (ref 80–100)
MONOCYTES # BLD: 0.7 K/UL (ref 0–1.3)
MONOCYTES NFR BLD: 7.6 %
NEUTROPHILS # BLD: 7.2 K/UL (ref 1.7–7.7)
NEUTROPHILS NFR BLD: 76.5 %
PLATELET # BLD AUTO: 293 K/UL (ref 135–450)
PMV BLD AUTO: 9.2 FL (ref 5–10.5)
POTASSIUM SERPL-SCNC: 4.7 MMOL/L (ref 3.5–5.1)
PROTHROMBIN TIME: 14.1 SEC (ref 11.9–14.9)
RBC # BLD AUTO: 4.52 M/UL (ref 4–5.2)
SODIUM SERPL-SCNC: 138 MMOL/L (ref 136–145)
WBC # BLD AUTO: 9.5 K/UL (ref 4–11)

## 2024-04-22 PROCEDURE — 73502 X-RAY EXAM HIP UNI 2-3 VIEWS: CPT

## 2024-04-22 PROCEDURE — 86922 COMPATIBILITY TEST ANTIGLOB: CPT

## 2024-04-22 PROCEDURE — 99222 1ST HOSP IP/OBS MODERATE 55: CPT | Performed by: ORTHOPAEDIC SURGERY

## 2024-04-22 PROCEDURE — 86900 BLOOD TYPING SEROLOGIC ABO: CPT

## 2024-04-22 PROCEDURE — 85025 COMPLETE CBC W/AUTO DIFF WBC: CPT

## 2024-04-22 PROCEDURE — 86880 COOMBS TEST DIRECT: CPT

## 2024-04-22 PROCEDURE — 85610 PROTHROMBIN TIME: CPT

## 2024-04-22 PROCEDURE — 1200000000 HC SEMI PRIVATE

## 2024-04-22 PROCEDURE — 86901 BLOOD TYPING SEROLOGIC RH(D): CPT

## 2024-04-22 PROCEDURE — 86902 BLOOD TYPE ANTIGEN DONOR EA: CPT

## 2024-04-22 PROCEDURE — 6370000000 HC RX 637 (ALT 250 FOR IP): Performed by: EMERGENCY MEDICINE

## 2024-04-22 PROCEDURE — 2580000003 HC RX 258: Performed by: STUDENT IN AN ORGANIZED HEALTH CARE EDUCATION/TRAINING PROGRAM

## 2024-04-22 PROCEDURE — 6360000002 HC RX W HCPCS: Performed by: STUDENT IN AN ORGANIZED HEALTH CARE EDUCATION/TRAINING PROGRAM

## 2024-04-22 PROCEDURE — 6370000000 HC RX 637 (ALT 250 FOR IP): Performed by: STUDENT IN AN ORGANIZED HEALTH CARE EDUCATION/TRAINING PROGRAM

## 2024-04-22 PROCEDURE — 6360000002 HC RX W HCPCS: Performed by: EMERGENCY MEDICINE

## 2024-04-22 PROCEDURE — 96374 THER/PROPH/DIAG INJ IV PUSH: CPT

## 2024-04-22 PROCEDURE — 86870 RBC ANTIBODY IDENTIFICATION: CPT

## 2024-04-22 PROCEDURE — 80048 BASIC METABOLIC PNL TOTAL CA: CPT

## 2024-04-22 PROCEDURE — 99285 EMERGENCY DEPT VISIT HI MDM: CPT

## 2024-04-22 PROCEDURE — 86850 RBC ANTIBODY SCREEN: CPT

## 2024-04-22 RX ORDER — SODIUM CHLORIDE 9 MG/ML
INJECTION, SOLUTION INTRAVENOUS PRN
Status: DISCONTINUED | OUTPATIENT
Start: 2024-04-22 | End: 2024-04-25 | Stop reason: HOSPADM

## 2024-04-22 RX ORDER — LIDOCAINE 4 G/G
2 PATCH TOPICAL ONCE
Status: COMPLETED | OUTPATIENT
Start: 2024-04-22 | End: 2024-04-23

## 2024-04-22 RX ORDER — ONDANSETRON 4 MG/1
4 TABLET, ORALLY DISINTEGRATING ORAL EVERY 8 HOURS PRN
Status: DISCONTINUED | OUTPATIENT
Start: 2024-04-22 | End: 2024-04-25 | Stop reason: HOSPADM

## 2024-04-22 RX ORDER — POTASSIUM CHLORIDE 7.45 MG/ML
10 INJECTION INTRAVENOUS PRN
Status: DISCONTINUED | OUTPATIENT
Start: 2024-04-22 | End: 2024-04-25 | Stop reason: HOSPADM

## 2024-04-22 RX ORDER — ONDANSETRON 2 MG/ML
4 INJECTION INTRAMUSCULAR; INTRAVENOUS EVERY 6 HOURS PRN
Status: DISCONTINUED | OUTPATIENT
Start: 2024-04-22 | End: 2024-04-25 | Stop reason: HOSPADM

## 2024-04-22 RX ORDER — POTASSIUM CHLORIDE 20 MEQ/1
40 TABLET, EXTENDED RELEASE ORAL PRN
Status: DISCONTINUED | OUTPATIENT
Start: 2024-04-22 | End: 2024-04-25 | Stop reason: HOSPADM

## 2024-04-22 RX ORDER — ACETAMINOPHEN 325 MG/1
650 TABLET ORAL EVERY 6 HOURS PRN
Status: DISCONTINUED | OUTPATIENT
Start: 2024-04-22 | End: 2024-04-25 | Stop reason: HOSPADM

## 2024-04-22 RX ORDER — MAGNESIUM SULFATE IN WATER 40 MG/ML
2000 INJECTION, SOLUTION INTRAVENOUS PRN
Status: DISCONTINUED | OUTPATIENT
Start: 2024-04-22 | End: 2024-04-25 | Stop reason: HOSPADM

## 2024-04-22 RX ORDER — SODIUM CHLORIDE 0.9 % (FLUSH) 0.9 %
5-40 SYRINGE (ML) INJECTION EVERY 12 HOURS SCHEDULED
Status: DISCONTINUED | OUTPATIENT
Start: 2024-04-22 | End: 2024-04-25 | Stop reason: HOSPADM

## 2024-04-22 RX ORDER — ACETAMINOPHEN 500 MG
1000 TABLET ORAL ONCE
Status: COMPLETED | OUTPATIENT
Start: 2024-04-22 | End: 2024-04-22

## 2024-04-22 RX ORDER — KETOROLAC TROMETHAMINE 15 MG/ML
10 INJECTION, SOLUTION INTRAMUSCULAR; INTRAVENOUS ONCE
Status: COMPLETED | OUTPATIENT
Start: 2024-04-22 | End: 2024-04-22

## 2024-04-22 RX ORDER — SODIUM CHLORIDE 0.9 % (FLUSH) 0.9 %
5-40 SYRINGE (ML) INJECTION PRN
Status: DISCONTINUED | OUTPATIENT
Start: 2024-04-22 | End: 2024-04-25 | Stop reason: HOSPADM

## 2024-04-22 RX ORDER — ACETAMINOPHEN 650 MG/1
650 SUPPOSITORY RECTAL EVERY 6 HOURS PRN
Status: DISCONTINUED | OUTPATIENT
Start: 2024-04-22 | End: 2024-04-25 | Stop reason: HOSPADM

## 2024-04-22 RX ORDER — POLYETHYLENE GLYCOL 3350 17 G/17G
17 POWDER, FOR SOLUTION ORAL DAILY PRN
Status: DISCONTINUED | OUTPATIENT
Start: 2024-04-22 | End: 2024-04-25 | Stop reason: HOSPADM

## 2024-04-22 RX ORDER — ENOXAPARIN SODIUM 100 MG/ML
40 INJECTION SUBCUTANEOUS EVERY 24 HOURS
Status: DISCONTINUED | OUTPATIENT
Start: 2024-04-22 | End: 2024-04-25 | Stop reason: HOSPADM

## 2024-04-22 RX ADMIN — KETOROLAC TROMETHAMINE 10 MG: 15 INJECTION, SOLUTION INTRAMUSCULAR; INTRAVENOUS at 12:35

## 2024-04-22 RX ADMIN — SODIUM CHLORIDE, PRESERVATIVE FREE 10 ML: 5 INJECTION INTRAVENOUS at 20:21

## 2024-04-22 RX ADMIN — ENOXAPARIN SODIUM 40 MG: 100 INJECTION SUBCUTANEOUS at 19:03

## 2024-04-22 RX ADMIN — ACETAMINOPHEN 1000 MG: 500 TABLET ORAL at 12:33

## 2024-04-22 RX ADMIN — ACETAMINOPHEN 650 MG: 325 TABLET ORAL at 20:20

## 2024-04-22 ASSESSMENT — PAIN - FUNCTIONAL ASSESSMENT
PAIN_FUNCTIONAL_ASSESSMENT: PREVENTS OR INTERFERES WITH MANY ACTIVE NOT PASSIVE ACTIVITIES
PAIN_FUNCTIONAL_ASSESSMENT: PREVENTS OR INTERFERES WITH MANY ACTIVE NOT PASSIVE ACTIVITIES

## 2024-04-22 ASSESSMENT — PAIN DESCRIPTION - ORIENTATION
ORIENTATION: LEFT

## 2024-04-22 ASSESSMENT — ENCOUNTER SYMPTOMS
COUGH: 0
ABDOMINAL PAIN: 0
BACK PAIN: 0
VOMITING: 0
SHORTNESS OF BREATH: 0
NAUSEA: 0

## 2024-04-22 ASSESSMENT — PAIN SCALES - GENERAL
PAINLEVEL_OUTOF10: 8
PAINLEVEL_OUTOF10: 8
PAINLEVEL_OUTOF10: 5

## 2024-04-22 ASSESSMENT — PAIN DESCRIPTION - LOCATION
LOCATION: HIP

## 2024-04-22 ASSESSMENT — PAIN DESCRIPTION - DESCRIPTORS
DESCRIPTORS: STABBING;SHARP
DESCRIPTORS: ACHING
DESCRIPTORS: SHARP
DESCRIPTORS: SHARP

## 2024-04-22 ASSESSMENT — LIFESTYLE VARIABLES
HOW MANY STANDARD DRINKS CONTAINING ALCOHOL DO YOU HAVE ON A TYPICAL DAY: PATIENT DOES NOT DRINK
HOW OFTEN DO YOU HAVE A DRINK CONTAINING ALCOHOL: NEVER

## 2024-04-22 ASSESSMENT — PAIN DESCRIPTION - ONSET: ONSET: SUDDEN

## 2024-04-22 ASSESSMENT — PAIN DESCRIPTION - PAIN TYPE: TYPE: ACUTE PAIN

## 2024-04-22 ASSESSMENT — PAIN DESCRIPTION - FREQUENCY: FREQUENCY: CONTINUOUS

## 2024-04-22 NOTE — ED NOTES
ED SBAR report provider to REUBEN Munoz. Patient to be transported to Room 4119 via stretcher by transport tech.Patient transported with bedside cardiac monitor. IV site clean, dry, and intact. MEWS score and pain assessed as 5/10 satisfied as long as she isnt moving. RN offered sign and held meds but patient doesn't want any other meds at this time and documented. Updated patient and family on plan of care.

## 2024-04-22 NOTE — ED PROVIDER NOTES
10.5 fL    Neutrophils % 76.5 %    Lymphocytes % 13.8 %    Monocytes % 7.6 %    Eosinophils % 1.6 %    Basophils % 0.5 %    Neutrophils Absolute 7.2 1.7 - 7.7 K/uL    Lymphocytes Absolute 1.3 1.0 - 5.1 K/uL    Monocytes Absolute 0.7 0.0 - 1.3 K/uL    Eosinophils Absolute 0.1 0.0 - 0.6 K/uL    Basophils Absolute 0.1 0.0 - 0.2 K/uL   Basic Metabolic Panel w/ Reflex to MG   Result Value Ref Range    Sodium 138 136 - 145 mmol/L    Potassium reflex Magnesium 4.7 3.5 - 5.1 mmol/L    Chloride 101 99 - 110 mmol/L    CO2 22 21 - 32 mmol/L    Anion Gap 15 3 - 16    Glucose 116 (H) 70 - 99 mg/dL    BUN 12 7 - 20 mg/dL    Creatinine 0.7 0.6 - 1.2 mg/dL    Est, Glom Filt Rate 82 >60    Calcium 9.3 8.3 - 10.6 mg/dL   Protime-INR   Result Value Ref Range    Protime 14.1 11.9 - 14.9 sec    INR 1.07 0.85 - 1.15   TYPE AND SCREEN   Result Value Ref Range    ABO/Rh O POS     Antibody Screen POS    ANTIBODY IDENTIFICATION   Result Value Ref Range    Antibody ID POS, Anti-Donna    NICOL IGG   Result Value Ref Range    NICOL IgG Capture NEG      XR HIP 2-3 VW W PELVIS LEFT   Final Result   Displaced left femoral neck fracture           _____________________________________________________________________    MEDICAL DECISION MAKING & PLANS:    I reviewed the patient's recent and/or pertinent records, current medications, triage notes, allergies, and vital signs.    Most recent VS:  BP: (!) 165/83,Temp: 98.1 °F (36.7 °C), Pulse: 69, Respirations: 15, SpO2: 95 %     Treatments:  Medications   lidocaine 4 % external patch 2 patch (2 patches TransDERmal Patch Applied 4/22/24 1243)   acetaminophen (TYLENOL) tablet 1,000 mg (1,000 mg Oral Given 4/22/24 1233)   ketorolac (TORADOL) injection 10 mg (10 mg IntraVENous Given 4/22/24 1233)     Disposition:  Unfortunately Ms. Mcneil appears to have suffered a significant and displaced fracture to her femoral neck on the left side.  Her distal MSK and neurovascular function seems to be intact.  She does

## 2024-04-23 ENCOUNTER — APPOINTMENT (OUTPATIENT)
Dept: GENERAL RADIOLOGY | Age: 89
DRG: 522 | End: 2024-04-23
Payer: MEDICARE

## 2024-04-23 ENCOUNTER — ANESTHESIA (OUTPATIENT)
Dept: OPERATING ROOM | Age: 89
DRG: 522 | End: 2024-04-23
Payer: MEDICARE

## 2024-04-23 ENCOUNTER — ANESTHESIA EVENT (OUTPATIENT)
Dept: OPERATING ROOM | Age: 89
DRG: 522 | End: 2024-04-23
Payer: MEDICARE

## 2024-04-23 LAB
GLUCOSE BLD-MCNC: 106 MG/DL (ref 70–99)
GLUCOSE BLD-MCNC: 186 MG/DL (ref 70–99)
PERFORMED ON: ABNORMAL
PERFORMED ON: ABNORMAL

## 2024-04-23 PROCEDURE — A4217 STERILE WATER/SALINE, 500 ML: HCPCS | Performed by: ORTHOPAEDIC SURGERY

## 2024-04-23 PROCEDURE — 6360000002 HC RX W HCPCS: Performed by: NURSE PRACTITIONER

## 2024-04-23 PROCEDURE — 2720000010 HC SURG SUPPLY STERILE: Performed by: ORTHOPAEDIC SURGERY

## 2024-04-23 PROCEDURE — 6360000002 HC RX W HCPCS: Performed by: ORTHOPAEDIC SURGERY

## 2024-04-23 PROCEDURE — 6360000002 HC RX W HCPCS

## 2024-04-23 PROCEDURE — 3600000015 HC SURGERY LEVEL 5 ADDTL 15MIN: Performed by: ORTHOPAEDIC SURGERY

## 2024-04-23 PROCEDURE — 2580000003 HC RX 258: Performed by: ORTHOPAEDIC SURGERY

## 2024-04-23 PROCEDURE — 72170 X-RAY EXAM OF PELVIS: CPT

## 2024-04-23 PROCEDURE — 1200000000 HC SEMI PRIVATE

## 2024-04-23 PROCEDURE — 0SRS0JZ REPLACEMENT OF LEFT HIP JOINT, FEMORAL SURFACE WITH SYNTHETIC SUBSTITUTE, OPEN APPROACH: ICD-10-PCS | Performed by: ORTHOPAEDIC SURGERY

## 2024-04-23 PROCEDURE — 6370000000 HC RX 637 (ALT 250 FOR IP): Performed by: STUDENT IN AN ORGANIZED HEALTH CARE EDUCATION/TRAINING PROGRAM

## 2024-04-23 PROCEDURE — 7100000001 HC PACU RECOVERY - ADDTL 15 MIN: Performed by: ORTHOPAEDIC SURGERY

## 2024-04-23 PROCEDURE — 6370000000 HC RX 637 (ALT 250 FOR IP): Performed by: ORTHOPAEDIC SURGERY

## 2024-04-23 PROCEDURE — 73502 X-RAY EXAM HIP UNI 2-3 VIEWS: CPT

## 2024-04-23 PROCEDURE — C1776 JOINT DEVICE (IMPLANTABLE): HCPCS | Performed by: ORTHOPAEDIC SURGERY

## 2024-04-23 PROCEDURE — 3600000014 HC SURGERY LEVEL 4 ADDTL 15MIN: Performed by: ORTHOPAEDIC SURGERY

## 2024-04-23 PROCEDURE — 7100000000 HC PACU RECOVERY - FIRST 15 MIN: Performed by: ORTHOPAEDIC SURGERY

## 2024-04-23 PROCEDURE — 3700000001 HC ADD 15 MINUTES (ANESTHESIA): Performed by: ORTHOPAEDIC SURGERY

## 2024-04-23 PROCEDURE — 2709999900 HC NON-CHARGEABLE SUPPLY: Performed by: ORTHOPAEDIC SURGERY

## 2024-04-23 PROCEDURE — 2500000003 HC RX 250 WO HCPCS

## 2024-04-23 PROCEDURE — 2580000003 HC RX 258: Performed by: NURSE PRACTITIONER

## 2024-04-23 PROCEDURE — 2580000003 HC RX 258: Performed by: STUDENT IN AN ORGANIZED HEALTH CARE EDUCATION/TRAINING PROGRAM

## 2024-04-23 PROCEDURE — 3600000004 HC SURGERY LEVEL 4 BASE: Performed by: ORTHOPAEDIC SURGERY

## 2024-04-23 PROCEDURE — 94150 VITAL CAPACITY TEST: CPT

## 2024-04-23 PROCEDURE — 3600000005 HC SURGERY LEVEL 5 BASE: Performed by: ORTHOPAEDIC SURGERY

## 2024-04-23 PROCEDURE — 6360000002 HC RX W HCPCS: Performed by: ANESTHESIOLOGY

## 2024-04-23 PROCEDURE — 3700000000 HC ANESTHESIA ATTENDED CARE: Performed by: ORTHOPAEDIC SURGERY

## 2024-04-23 RX ORDER — PHENYLEPHRINE HCL IN 0.9% NACL 1 MG/10 ML
SYRINGE (ML) INTRAVENOUS PRN
Status: DISCONTINUED | OUTPATIENT
Start: 2024-04-23 | End: 2024-04-23 | Stop reason: SDUPTHER

## 2024-04-23 RX ORDER — FENTANYL CITRATE 50 UG/ML
INJECTION, SOLUTION INTRAMUSCULAR; INTRAVENOUS PRN
Status: DISCONTINUED | OUTPATIENT
Start: 2024-04-23 | End: 2024-04-23 | Stop reason: SDUPTHER

## 2024-04-23 RX ORDER — OXYCODONE HYDROCHLORIDE 10 MG/1
10 TABLET ORAL PRN
Status: DISCONTINUED | OUTPATIENT
Start: 2024-04-23 | End: 2024-04-23 | Stop reason: HOSPADM

## 2024-04-23 RX ORDER — OXYCODONE HYDROCHLORIDE 5 MG/1
5 TABLET ORAL PRN
Status: DISCONTINUED | OUTPATIENT
Start: 2024-04-23 | End: 2024-04-23 | Stop reason: HOSPADM

## 2024-04-23 RX ORDER — MORPHINE SULFATE 2 MG/ML
2 INJECTION, SOLUTION INTRAMUSCULAR; INTRAVENOUS
Status: DISCONTINUED | OUTPATIENT
Start: 2024-04-23 | End: 2024-04-25 | Stop reason: HOSPADM

## 2024-04-23 RX ORDER — ROCURONIUM BROMIDE 10 MG/ML
INJECTION, SOLUTION INTRAVENOUS PRN
Status: DISCONTINUED | OUTPATIENT
Start: 2024-04-23 | End: 2024-04-23 | Stop reason: SDUPTHER

## 2024-04-23 RX ORDER — SODIUM CHLORIDE 0.9 % (FLUSH) 0.9 %
5-40 SYRINGE (ML) INJECTION EVERY 12 HOURS SCHEDULED
Status: DISCONTINUED | OUTPATIENT
Start: 2024-04-23 | End: 2024-04-23 | Stop reason: HOSPADM

## 2024-04-23 RX ORDER — TRANEXAMIC ACID 650 MG/1
1950 TABLET ORAL ONCE
Status: DISCONTINUED | OUTPATIENT
Start: 2024-04-23 | End: 2024-04-23 | Stop reason: SDUPTHER

## 2024-04-23 RX ORDER — LIDOCAINE HYDROCHLORIDE 20 MG/ML
INJECTION, SOLUTION EPIDURAL; INFILTRATION; INTRACAUDAL; PERINEURAL PRN
Status: DISCONTINUED | OUTPATIENT
Start: 2024-04-23 | End: 2024-04-23 | Stop reason: SDUPTHER

## 2024-04-23 RX ORDER — ONDANSETRON 2 MG/ML
4 INJECTION INTRAMUSCULAR; INTRAVENOUS
Status: COMPLETED | OUTPATIENT
Start: 2024-04-23 | End: 2024-04-23

## 2024-04-23 RX ORDER — TRANEXAMIC ACID 650 MG/1
1950 TABLET ORAL ONCE
Status: DISCONTINUED | OUTPATIENT
Start: 2024-04-23 | End: 2024-04-23

## 2024-04-23 RX ORDER — OXYCODONE HYDROCHLORIDE 10 MG/1
10 TABLET ORAL EVERY 4 HOURS PRN
Status: DISCONTINUED | OUTPATIENT
Start: 2024-04-23 | End: 2024-04-25 | Stop reason: HOSPADM

## 2024-04-23 RX ORDER — GLYCOPYRROLATE 0.2 MG/ML
INJECTION INTRAMUSCULAR; INTRAVENOUS PRN
Status: DISCONTINUED | OUTPATIENT
Start: 2024-04-23 | End: 2024-04-23 | Stop reason: SDUPTHER

## 2024-04-23 RX ORDER — OXYCODONE HYDROCHLORIDE 5 MG/1
5 TABLET ORAL EVERY 4 HOURS PRN
Status: DISCONTINUED | OUTPATIENT
Start: 2024-04-23 | End: 2024-04-25 | Stop reason: HOSPADM

## 2024-04-23 RX ORDER — NALOXONE HYDROCHLORIDE 0.4 MG/ML
INJECTION, SOLUTION INTRAMUSCULAR; INTRAVENOUS; SUBCUTANEOUS PRN
Status: DISCONTINUED | OUTPATIENT
Start: 2024-04-23 | End: 2024-04-23 | Stop reason: HOSPADM

## 2024-04-23 RX ORDER — DEXAMETHASONE SODIUM PHOSPHATE 4 MG/ML
INJECTION, SOLUTION INTRA-ARTICULAR; INTRALESIONAL; INTRAMUSCULAR; INTRAVENOUS; SOFT TISSUE PRN
Status: DISCONTINUED | OUTPATIENT
Start: 2024-04-23 | End: 2024-04-23 | Stop reason: SDUPTHER

## 2024-04-23 RX ORDER — TRANEXAMIC ACID 650 MG/1
1950 TABLET ORAL ONCE
Status: COMPLETED | OUTPATIENT
Start: 2024-04-23 | End: 2024-04-23

## 2024-04-23 RX ORDER — SODIUM CHLORIDE 9 MG/ML
INJECTION, SOLUTION INTRAVENOUS PRN
Status: DISCONTINUED | OUTPATIENT
Start: 2024-04-23 | End: 2024-04-25 | Stop reason: HOSPADM

## 2024-04-23 RX ORDER — SODIUM CHLORIDE 0.9 % (FLUSH) 0.9 %
5-40 SYRINGE (ML) INJECTION PRN
Status: DISCONTINUED | OUTPATIENT
Start: 2024-04-23 | End: 2024-04-23 | Stop reason: HOSPADM

## 2024-04-23 RX ORDER — MORPHINE SULFATE 4 MG/ML
4 INJECTION, SOLUTION INTRAMUSCULAR; INTRAVENOUS
Status: DISCONTINUED | OUTPATIENT
Start: 2024-04-23 | End: 2024-04-25 | Stop reason: HOSPADM

## 2024-04-23 RX ORDER — PROCHLORPERAZINE EDISYLATE 5 MG/ML
10 INJECTION INTRAMUSCULAR; INTRAVENOUS ONCE
Status: COMPLETED | OUTPATIENT
Start: 2024-04-23 | End: 2024-04-23

## 2024-04-23 RX ORDER — SUCCINYLCHOLINE/SOD CL,ISO/PF 200MG/10ML
SYRINGE (ML) INTRAVENOUS PRN
Status: DISCONTINUED | OUTPATIENT
Start: 2024-04-23 | End: 2024-04-23 | Stop reason: SDUPTHER

## 2024-04-23 RX ORDER — PROPOFOL 10 MG/ML
INJECTION, EMULSION INTRAVENOUS PRN
Status: DISCONTINUED | OUTPATIENT
Start: 2024-04-23 | End: 2024-04-23 | Stop reason: SDUPTHER

## 2024-04-23 RX ORDER — ONDANSETRON 2 MG/ML
INJECTION INTRAMUSCULAR; INTRAVENOUS PRN
Status: DISCONTINUED | OUTPATIENT
Start: 2024-04-23 | End: 2024-04-23 | Stop reason: SDUPTHER

## 2024-04-23 RX ADMIN — GLYCOPYRROLATE 0.1 MG: 0.2 INJECTION, SOLUTION INTRAMUSCULAR; INTRAVENOUS at 12:11

## 2024-04-23 RX ADMIN — Medication 50 MCG: at 12:17

## 2024-04-23 RX ADMIN — DEXAMETHASONE SODIUM PHOSPHATE 4 MG: 4 INJECTION, SOLUTION INTRAMUSCULAR; INTRAVENOUS at 12:07

## 2024-04-23 RX ADMIN — ONDANSETRON 4 MG: 2 INJECTION INTRAMUSCULAR; INTRAVENOUS at 22:28

## 2024-04-23 RX ADMIN — FENTANYL CITRATE 25 MCG: 50 INJECTION INTRAMUSCULAR; INTRAVENOUS at 12:37

## 2024-04-23 RX ADMIN — ROCURONIUM BROMIDE 10 MG: 10 SOLUTION INTRAVENOUS at 12:07

## 2024-04-23 RX ADMIN — OXYCODONE 5 MG: 5 TABLET ORAL at 22:25

## 2024-04-23 RX ADMIN — ONDANSETRON 4 MG: 2 INJECTION INTRAMUSCULAR; INTRAVENOUS at 12:07

## 2024-04-23 RX ADMIN — SODIUM CHLORIDE: 9 INJECTION, SOLUTION INTRAVENOUS at 11:25

## 2024-04-23 RX ADMIN — ROCURONIUM BROMIDE 20 MG: 10 SOLUTION INTRAVENOUS at 12:13

## 2024-04-23 RX ADMIN — Medication 100 MG: at 12:07

## 2024-04-23 RX ADMIN — Medication 100 MCG: at 13:09

## 2024-04-23 RX ADMIN — SODIUM CHLORIDE 2000 MG: 900 INJECTION INTRAVENOUS at 00:13

## 2024-04-23 RX ADMIN — ACETAMINOPHEN 650 MG: 325 TABLET ORAL at 02:32

## 2024-04-23 RX ADMIN — ENOXAPARIN SODIUM 40 MG: 100 INJECTION SUBCUTANEOUS at 19:32

## 2024-04-23 RX ADMIN — FENTANYL CITRATE 25 MCG: 50 INJECTION INTRAMUSCULAR; INTRAVENOUS at 12:34

## 2024-04-23 RX ADMIN — FENTANYL CITRATE 50 MCG: 50 INJECTION INTRAMUSCULAR; INTRAVENOUS at 12:07

## 2024-04-23 RX ADMIN — ROCURONIUM BROMIDE 20 MG: 10 SOLUTION INTRAVENOUS at 12:42

## 2024-04-23 RX ADMIN — LIDOCAINE HYDROCHLORIDE 60 MG: 20 INJECTION, SOLUTION EPIDURAL; INFILTRATION; INTRACAUDAL; PERINEURAL at 12:07

## 2024-04-23 RX ADMIN — HYDROMORPHONE HYDROCHLORIDE 0.25 MG: 1 INJECTION, SOLUTION INTRAMUSCULAR; INTRAVENOUS; SUBCUTANEOUS at 14:07

## 2024-04-23 RX ADMIN — ONDANSETRON 4 MG: 2 INJECTION INTRAMUSCULAR; INTRAVENOUS at 14:23

## 2024-04-23 RX ADMIN — SODIUM CHLORIDE 2000 MG: 900 INJECTION INTRAVENOUS at 12:09

## 2024-04-23 RX ADMIN — SODIUM CHLORIDE 2000 MG: 900 INJECTION INTRAVENOUS at 19:34

## 2024-04-23 RX ADMIN — PROPOFOL 100 MG: 10 INJECTION, EMULSION INTRAVENOUS at 12:07

## 2024-04-23 RX ADMIN — TRANEXAMIC ACID 1950 MG: 650 TABLET ORAL at 11:24

## 2024-04-23 RX ADMIN — HYDROMORPHONE HYDROCHLORIDE 0.25 MG: 1 INJECTION, SOLUTION INTRAMUSCULAR; INTRAVENOUS; SUBCUTANEOUS at 14:14

## 2024-04-23 RX ADMIN — PROCHLORPERAZINE EDISYLATE 10 MG: 5 INJECTION INTRAMUSCULAR; INTRAVENOUS at 14:46

## 2024-04-23 ASSESSMENT — PAIN SCALES - GENERAL
PAINLEVEL_OUTOF10: 0
PAINLEVEL_OUTOF10: 0
PAINLEVEL_OUTOF10: 4
PAINLEVEL_OUTOF10: 4
PAINLEVEL_OUTOF10: 0
PAINLEVEL_OUTOF10: 4
PAINLEVEL_OUTOF10: 0
PAINLEVEL_OUTOF10: 4
PAINLEVEL_OUTOF10: 0
PAINLEVEL_OUTOF10: 5
PAINLEVEL_OUTOF10: 0
PAINLEVEL_OUTOF10: 2

## 2024-04-23 ASSESSMENT — PAIN DESCRIPTION - DESCRIPTORS
DESCRIPTORS: ACHING

## 2024-04-23 ASSESSMENT — PAIN DESCRIPTION - ONSET
ONSET: ON-GOING

## 2024-04-23 ASSESSMENT — PAIN - FUNCTIONAL ASSESSMENT
PAIN_FUNCTIONAL_ASSESSMENT: PREVENTS OR INTERFERES SOME ACTIVE ACTIVITIES AND ADLS

## 2024-04-23 ASSESSMENT — PAIN DESCRIPTION - PAIN TYPE
TYPE: SURGICAL PAIN
TYPE: ACUTE PAIN

## 2024-04-23 ASSESSMENT — PAIN DESCRIPTION - LOCATION
LOCATION: HIP

## 2024-04-23 ASSESSMENT — ENCOUNTER SYMPTOMS: SHORTNESS OF BREATH: 0

## 2024-04-23 ASSESSMENT — PAIN DESCRIPTION - ORIENTATION
ORIENTATION: LEFT

## 2024-04-23 ASSESSMENT — PAIN DESCRIPTION - FREQUENCY
FREQUENCY: CONTINUOUS

## 2024-04-23 NOTE — PLAN OF CARE
Problem: Discharge Planning  Goal: Discharge to home or other facility with appropriate resources  4/23/2024 1102 by Germaine Chaney RN  Outcome: Progressing  4/22/2024 2241 by Colton Murcia RN  Outcome: Progressing     Problem: Pain  Goal: Verbalizes/displays adequate comfort level or baseline comfort level  4/23/2024 1102 by Germaine Chaney RN  Outcome: Progressing  4/22/2024 2241 by Colton Murcia RN  Outcome: Progressing     Problem: Skin/Tissue Integrity  Goal: Absence of new skin breakdown  Description: 1.  Monitor for areas of redness and/or skin breakdown  2.  Assess vascular access sites hourly  3.  Every 4-6 hours minimum:  Change oxygen saturation probe site  4.  Every 4-6 hours:  If on nasal continuous positive airway pressure, respiratory therapy assess nares and determine need for appliance change or resting period.  4/23/2024 1102 by Germaine Chaney RN  Outcome: Progressing  4/22/2024 2241 by Colton Murcia RN  Outcome: Progressing     Problem: ABCDS Injury Assessment  Goal: Absence of physical injury  4/23/2024 1102 by Germaine Chaney RN  Outcome: Progressing  4/22/2024 2241 by Colton Murcia RN  Outcome: Progressing     Problem: Safety - Adult  Goal: Free from fall injury  4/23/2024 1102 by Germaine Chaney RN  Outcome: Progressing  4/22/2024 2241 by Colton Murcia RN  Outcome: Progressing

## 2024-04-23 NOTE — FLOWSHEET NOTE
Pt. Said that her nausea is \"awful.\"  Dr. Stewart contacted.  Compazine ordered.  Pt. Remains very sleepy.  Denies pain.  O2 sat 92% on RA.

## 2024-04-23 NOTE — ANESTHESIA PRE PROCEDURE
[facility] Department of Anesthesiology  Pre-Anesthesia Evaluation/Consultation       Name:  Brooklyn Mcneil  : 10/16/1934  Age:  89 y.o.                                           MRN:  [id number]  Date: 2024           Surgeon: Surgeon(s):  Kofi Underwood MD    Procedure: Procedure(s):  LEFT ANTERIOR HIP HEMIARTHROPLASTY     Allergies   Allergen Reactions   • Sulfa Antibiotics    • Trimethoprim    • Bactrim [Sulfamethoxazole-Trimethoprim] Rash     Patient Active Problem List   Diagnosis   • SVT (supraventricular tachycardia) ([facility])   • History of breast cancer   • Aortic insufficiency   • CAD (coronary artery disease)   • Stented coronary artery   • Essential hypertension   • Mixed hyperlipidemia   • Lumbar canal stenosis-severe at L4-L5, mild at L2-L3 and L3-L4   • Peripheral polyneuropathy   • Major depressive disorder with single episode   • Major depressive disorder with single episode, in partial remission ([facility])   • Neuropathy   • Cataract extraction status of eye, left   • Cataract extraction status of eye, right   • Vestibular dizziness   • Chronic pain of right knee   • Difficulty in walking   • Hip pain, acute, left   • Fracture of femoral neck, left, closed ([facility])   • Fall from standing     Past Medical History:   Diagnosis Date   • Aortic insufficiency     Mild   • Breast cancer (HCC)     Left, chemo and radiation   • CAD (coronary artery disease)    • Heart attack ([facility]) 2013   • HTN (hypertension)    • Hx of blood clots      after chemo   • SVT (supraventricular tachycardia) ([facility])      Past Surgical History:   Procedure Laterality Date   • BREAST SURGERY     • CARDIAC SURGERY      STENTS   • CATARACT REMOVAL WITH IMPLANT Left 2018    Dr. Rudolph   • CORONARY ANGIOPLASTY WITH STENT PLACEMENT      2 stents   • HYSTERECTOMY (CERVIX STATUS UNKNOWN) N/A 2016   • MASTECTOMY Left    • SKIN CANCER EXCISION     • UPPER GASTROINTESTINAL ENDOSCOPY  2018    Dr Mayorga,

## 2024-04-23 NOTE — OP NOTE
Patient: Brooklyn Mcneil  YOB: 1934  MRN: [id number]    Date of Procedure: 4/23/2024      Pre-Op Diagnosis: Displaced left femoral neck fracture     Post-Op Diagnosis: Same       Procedure Performed: Left hip hemiarthroplasty     Surgeon: Kofi Underwood MD     Physician Assistant: ARIE Nobles     Anesthesia: General     Estimated Blood Loss: 50 mL      Complications: None    Implants:  Depuy Actis stem, size 6 standard offset  Bipolar head: 28 + 5 mm inner, 45 mm outer    Indications: This is a 89 y.o. female who sustained a displaced left femoral neck fracture yesterday. We discussed the diagnosis and treatment options and I recommended hip hemiarthroplasty. The operative procedure, alternatives, and risks were discussed in detail with the patient.  The risks include but are not limited to: Infection, vessel injury, nerve injury, DVT, pulmonary embolism, implant loosening, need for revision surgery, leg length discrepancy, dislocation, lateral femoral cutaneous nerve palsy, intraoperative fracture. Informed consent for surgery was signed by the patient.     Details:  The patient was seen in the preoperative holding area where the site of surgery was marked and informed consent was confirmed. The patient was brought back to the operating room by OR personnel. Anesthesia was administered. The patient was positioned supine on the [place] table. The left lower extremity was then prepped and draped in a standard and sterile fashion. A final and formal timeout was then performed which confirmed the correct patient, correct position, and correct site of surgery. IV antibiotics were administered within 1 hour of the skin incision.     A direct anterior supine approach was utilized. The skin and subcutaneous tissue were dissected down to the body of the tensor fascia madhavi. Incision into the fascia of the TFL was made and dissection was carried medial to the tensor and lateral to the rectus femoris and

## 2024-04-23 NOTE — FLOWSHEET NOTE
Pt. Opens her eyes and is able to speak.  Left hip pain is improving.  Tolerating ice chips.  O2 sat 97% on 2L/NC.

## 2024-04-23 NOTE — H&P
Update History & Physical    The patient's History and Physical of April 22, 2024 was reviewed with the patient and I examined the patient. There was no change. The surgical site was confirmed by the patient and me.       Plan: The risks, benefits, expected outcome, and alternative to the recommended procedure have been discussed with the patient. Patient understands and wants to proceed with the procedure.     Electronically signed by FELA RAMIREZ MD on 4/23/2024 at 11:47 AM        
normal  Peripheral Pulses: +2 palpable, equal bilaterally       Labs:     Recent Labs     04/22/24  1225   WBC 9.5   HGB 14.4   HCT 42.8        Recent Labs     04/22/24  1225      K 4.7      CO2 22   BUN 12   CREATININE 0.7   CALCIUM 9.3     No results for input(s): \"AST\", \"ALT\", \"BILIDIR\", \"BILITOT\", \"ALKPHOS\" in the last 72 hours.  Recent Labs     04/22/24  1225   INR 1.07     No results for input(s): \"CKTOTAL\", \"TROPHS\" in the last 72 hours.    Urinalysis:      Lab Results   Component Value Date/Time    NITRU Negative 02/08/2022 03:22 PM    WBCUA 1 12/22/2019 01:27 PM    RBCUA 1 12/22/2019 01:27 PM    BLOODU Negative 02/08/2022 03:22 PM    SPECGRAV 1.013 02/08/2022 03:22 PM    GLUCOSEU Negative 02/08/2022 03:22 PM       Radiology:     XR HIP 2-3 VW W PELVIS LEFT   Final Result   Displaced left femoral neck fracture             Consults:    IP CONSULT TO ORTHOPEDIC SURGERY    ASSESSMENT:    Active Hospital Problems    Diagnosis Date Noted    Hip pain, acute, left [M25.552] 04/22/2024       Left femoral neck fracture.   Fall.   Patient presented to emergency after mechanical fall where she was trying to step over a ledge and lost her balance and fell, patient presented to emergency with left hip pain.  Otherwise vitally stable, no signs of neurovascular compromise.  X-ray hip with displaced left femoral neck fracture.  Plan.  -Appreciate orthopedic surgery input.  -Keep n.p.o. in case of possible surgery today otherwise n.p.o. from midnight.  -Pain currently controlled with Tylenol, will add small dose of oxycodone as needed  -Close clinical observation.     History of breast cancer.  In remission post chemo and radiation    Social.  Patient active, lives with her son and daughter  Code status dicussed with patient and daughter, full code for now     DVT Prophylaxis: lovenox   Diet: No diet orders on file  Code Status: Prior    PT/OT Eval Status: pending ortho clearance       Dispo - pending

## 2024-04-23 NOTE — FLOWSHEET NOTE
Family at bedside to visit.  Understands that she will not be returning to her previous room.  Need to wait for ortho bed.

## 2024-04-23 NOTE — FLOWSHEET NOTE
Pt. Remains lethargic.  On O2 2L/NC.  Pt. Placed on end-title CO2- 21.  Dr. Stewart notified.  Here in PACU to assess.

## 2024-04-23 NOTE — ANESTHESIA POSTPROCEDURE EVALUATION
signs stable  OK to discharge from Stage I post anesthesia care.  Care transferred from Anesthesiology department on discharge from perioperative area   Pain management: satisfactory to patient    No notable events documented.

## 2024-04-23 NOTE — FLOWSHEET NOTE
Attempted to give report to RN on the 4th floor.  Pt. Now has to go to ortho floor.  Waiting for bed to be available.

## 2024-04-24 LAB
BACTERIA URNS QL MICRO: ABNORMAL /HPF
BILIRUB UR QL STRIP.AUTO: NEGATIVE
CLARITY UR: CLEAR
COLOR UR: YELLOW
EPI CELLS #/AREA URNS AUTO: 1 /HPF (ref 0–5)
GLUCOSE BLD-MCNC: 117 MG/DL (ref 70–99)
GLUCOSE BLD-MCNC: 128 MG/DL (ref 70–99)
GLUCOSE BLD-MCNC: 130 MG/DL (ref 70–99)
GLUCOSE BLD-MCNC: 168 MG/DL (ref 70–99)
GLUCOSE UR STRIP.AUTO-MCNC: NEGATIVE MG/DL
HGB UR QL STRIP.AUTO: NEGATIVE
HYALINE CASTS #/AREA URNS AUTO: 1 /LPF (ref 0–8)
KETONES UR STRIP.AUTO-MCNC: NEGATIVE MG/DL
LEUKOCYTE ESTERASE UR QL STRIP.AUTO: NEGATIVE
NITRITE UR QL STRIP.AUTO: NEGATIVE
PERFORMED ON: ABNORMAL
PH UR STRIP.AUTO: 5.5 [PH] (ref 5–8)
PROT UR STRIP.AUTO-MCNC: ABNORMAL MG/DL
RBC CLUMPS #/AREA URNS AUTO: 0 /HPF (ref 0–4)
SP GR UR STRIP.AUTO: 1.02 (ref 1–1.03)
UA DIPSTICK W REFLEX MICRO PNL UR: YES
URN SPEC COLLECT METH UR: ABNORMAL
UROBILINOGEN UR STRIP-ACNC: 0.2 E.U./DL
WBC #/AREA URNS AUTO: 2 /HPF (ref 0–5)

## 2024-04-24 PROCEDURE — 81001 URINALYSIS AUTO W/SCOPE: CPT

## 2024-04-24 PROCEDURE — 97161 PT EVAL LOW COMPLEX 20 MIN: CPT

## 2024-04-24 PROCEDURE — 2580000003 HC RX 258: Performed by: ORTHOPAEDIC SURGERY

## 2024-04-24 PROCEDURE — 97116 GAIT TRAINING THERAPY: CPT

## 2024-04-24 PROCEDURE — 1200000000 HC SEMI PRIVATE

## 2024-04-24 PROCEDURE — 6360000002 HC RX W HCPCS: Performed by: ORTHOPAEDIC SURGERY

## 2024-04-24 PROCEDURE — 6370000000 HC RX 637 (ALT 250 FOR IP): Performed by: STUDENT IN AN ORGANIZED HEALTH CARE EDUCATION/TRAINING PROGRAM

## 2024-04-24 PROCEDURE — 6370000000 HC RX 637 (ALT 250 FOR IP): Performed by: ORTHOPAEDIC SURGERY

## 2024-04-24 PROCEDURE — 97530 THERAPEUTIC ACTIVITIES: CPT

## 2024-04-24 PROCEDURE — 97166 OT EVAL MOD COMPLEX 45 MIN: CPT

## 2024-04-24 PROCEDURE — 97535 SELF CARE MNGMENT TRAINING: CPT

## 2024-04-24 RX ORDER — BENZOCAINE/MENTHOL 6 MG-10 MG
LOZENGE MUCOUS MEMBRANE DAILY PRN
Status: DISCONTINUED | OUTPATIENT
Start: 2024-04-24 | End: 2024-04-25 | Stop reason: HOSPADM

## 2024-04-24 RX ORDER — ASPIRIN 81 MG/1
81 TABLET ORAL 2 TIMES DAILY
Qty: 60 TABLET | Refills: 0 | Status: SHIPPED | OUTPATIENT
Start: 2024-04-24 | End: 2024-05-24

## 2024-04-24 RX ORDER — OXYCODONE HYDROCHLORIDE 5 MG/1
5 TABLET ORAL EVERY 8 HOURS PRN
Qty: 15 TABLET | Refills: 0 | Status: SHIPPED | OUTPATIENT
Start: 2024-04-24 | End: 2024-04-29

## 2024-04-24 RX ORDER — HYDROCORTISONE CREAM 1% 10 MG/G
1 CREAM TOPICAL DAILY PRN
Status: DISCONTINUED | OUTPATIENT
Start: 2024-04-24 | End: 2024-04-24 | Stop reason: CLARIF

## 2024-04-24 RX ORDER — ACETAMINOPHEN 325 MG/1
650 TABLET ORAL EVERY 6 HOURS PRN
Qty: 120 TABLET | Refills: 0
Start: 2024-04-24

## 2024-04-24 RX ADMIN — OXYCODONE 5 MG: 5 TABLET ORAL at 15:51

## 2024-04-24 RX ADMIN — SODIUM CHLORIDE, PRESERVATIVE FREE 10 ML: 5 INJECTION INTRAVENOUS at 10:33

## 2024-04-24 RX ADMIN — SODIUM CHLORIDE 2000 MG: 900 INJECTION INTRAVENOUS at 02:38

## 2024-04-24 RX ADMIN — OXYCODONE 5 MG: 5 TABLET ORAL at 10:33

## 2024-04-24 RX ADMIN — ENOXAPARIN SODIUM 40 MG: 100 INJECTION SUBCUTANEOUS at 17:56

## 2024-04-24 RX ADMIN — POLYETHYLENE GLYCOL 3350 17 G: 17 POWDER, FOR SOLUTION ORAL at 13:07

## 2024-04-24 RX ADMIN — HYDROCORTISONE: 0.01 CREAM TOPICAL at 15:52

## 2024-04-24 RX ADMIN — OXYCODONE 5 MG: 5 TABLET ORAL at 05:20

## 2024-04-24 ASSESSMENT — PAIN DESCRIPTION - FREQUENCY
FREQUENCY: INTERMITTENT

## 2024-04-24 ASSESSMENT — PAIN DESCRIPTION - PAIN TYPE
TYPE: SURGICAL PAIN

## 2024-04-24 ASSESSMENT — PAIN DESCRIPTION - ONSET
ONSET: GRADUAL
ONSET: ON-GOING
ONSET: ON-GOING
ONSET: GRADUAL

## 2024-04-24 ASSESSMENT — PAIN DESCRIPTION - ORIENTATION
ORIENTATION: LEFT

## 2024-04-24 ASSESSMENT — PAIN SCALES - GENERAL
PAINLEVEL_OUTOF10: 0
PAINLEVEL_OUTOF10: 7
PAINLEVEL_OUTOF10: 3
PAINLEVEL_OUTOF10: 6
PAINLEVEL_OUTOF10: 3
PAINLEVEL_OUTOF10: 0

## 2024-04-24 ASSESSMENT — PAIN - FUNCTIONAL ASSESSMENT
PAIN_FUNCTIONAL_ASSESSMENT: PREVENTS OR INTERFERES SOME ACTIVE ACTIVITIES AND ADLS

## 2024-04-24 ASSESSMENT — PAIN DESCRIPTION - LOCATION
LOCATION: HIP

## 2024-04-24 ASSESSMENT — PAIN DESCRIPTION - DESCRIPTORS
DESCRIPTORS: ACHING

## 2024-04-24 NOTE — CONSULTS
[facility] Orthopedic Surgery  Consult Note    This patient is seen in consultation at the request of Dr Kim    Reason for Consult:  left hip fracture    CHIEF COMPLAINT:  left hip pain    History Obtained From:  patient, electronic medical record    HISTORY OF PRESENT ILLNESS:    The patient is a 89 y.o. female who presents with left hip pain after a fall in a parking lot. She stepped on a curb and lost her balance. She is alert and oriented. REsting quietly. Pain is described in left hip and with the intensity of moderate. Pain is described as aching. Discomfort is intermittent.  No other complaints.     Past Medical History:        Diagnosis Date    Aortic insufficiency     Mild    Breast cancer (HCC)     Left, chemo and radiation    CAD (coronary artery disease)     Heart attack (HCC) 2013    HTN (hypertension)     Hx of blood clots      after chemo    SVT (supraventricular tachycardia) (HCC)        Past Surgical History:        Procedure Laterality Date    BREAST SURGERY      CARDIAC SURGERY      STENTS    CATARACT REMOVAL WITH IMPLANT Left 2018    Dr. Rudolph    CORONARY ANGIOPLASTY WITH STENT PLACEMENT      2 stents    HYSTERECTOMY (CERVIX STATUS UNKNOWN) N/A 2016    MASTECTOMY Left     SKIN CANCER EXCISION      UPPER GASTROINTESTINAL ENDOSCOPY  2018    Dr Mayorga, biopsies       Social History     Tobacco Use    Smoking status: Former     Current packs/day: 0.00     Types: Cigarettes     Quit date: 1963     Years since quittin.3    Smokeless tobacco: Never    Tobacco comments:     quit 30 yrs   Substance Use Topics    Alcohol use: No       Family History   Problem Relation Age of Onset    Heart Disease Mother     Colon Cancer Father            Current Medications:   Current Facility-Administered Medications: lidocaine 4 % external patch 2 patch, 2 patch, TransDERmal, Once  sodium chloride flush 0.9 % injection 5-40 mL, 5-40 mL, IntraVENous, 2 times per day  sodium chloride 
100 % --   04/23/24 1510 -- -- -- 63 14 100 % --   04/23/24 1505 -- -- -- 60 10 100 % --   04/23/24 1500 (!) 149/63 -- -- 60 (!) 9 100 % --   04/23/24 1452 -- -- -- 60 10 94 % --   04/23/24 1450 -- -- -- 60 14 (!) 87 % --   04/23/24 1445 (!) 157/69 -- -- 61 13 93 % --   04/23/24 1440 -- -- -- 62 12 96 % --   04/23/24 1435 (!) 158/68 -- -- 60 12 100 % --   04/23/24 1430 (!) 159/75 -- -- 63 10 100 % --   04/23/24 1425 -- -- -- 60 10 100 % --   04/23/24 1420 -- -- -- 60 14 100 % --   04/23/24 1415 (!) 159/75 -- -- 60 (!) 7 100 % --   04/23/24 1414 -- -- -- 60 (!) 8 100 % --   04/23/24 1410 -- -- -- 60 10 94 % --   04/23/24 1405 -- -- -- 60 11 96 % --   04/23/24 1400 (!) 163/68 -- -- 61 12 90 % --   04/23/24 1355 -- -- -- 60 10 93 % --   04/23/24 1350 (!) 160/73 -- -- 60 14 95 % --   04/23/24 1345 (!) 160/70 -- -- 60 (!) 9 99 % --   04/23/24 1343 -- -- -- 60 -- -- --   04/23/24 1340 (!) 147/65 -- -- 60 12 100 % --   04/23/24 1335 (!) 146/69 96.8 °F (36 °C) Temporal 64 12 100 % --   04/23/24 1111 135/70 98.8 °F (37.1 °C) Temporal 70 18 96 % --     Const: Alert. WDWN. No distress  Eyes: Conjunctiva noninjected, no icterus noted; pupils equal, round, and reactive to light.   HENT: Atraumatic, normocephalic; Oral mucosa moist  Neck: Trachea midline, neck supple. No thyromegaly noted.  CV: Regular rate and rhythm, no murmur rub or gallop noted  Resp: Lungs clear to auscultation bilaterally, no rales wheezes or rhonchi, no retractions. Respirations unlabored.   GI: Soft, nontender, nondistended. Normal bowel sounds. No palpable masses.   Skin: Left hip incision and left knee abrasion dressed, c/d/I. Small abrasions on hands.   Ext: No significant edema appreciated. No varicosities.  MSK: Decreased left hip ROM. No joint tenderness, erythema, warmth noted.    Neuro: Alert, oriented, appropriate. No cranial nerve deficits appreciated. Sensation intact to light touch but decreased/diminished in BLE up to knees. Motor examination

## 2024-04-24 NOTE — DISCHARGE INSTR - COC
[facility] Continuity of Care Form    Patient Name:  Brooklyn Mcneli  : 10/16/1934    MRN:  [id number]    Admit date:  2024  Discharge date:  24    Code Status Order: Full Code  Advance Directives: No    Admitting Physician: Kianna Asher MD  PCP: Narcisa Garrido MD    Discharging Nurse: Lisbeth  Discharging Hospital Unit/Room#: Q1U-3516/3124-01  Discharging Unit Phone Number: [phone]    Emergency Contact:        Past Surgical History:  Past Surgical History:   Procedure Laterality Date    BREAST SURGERY      CARDIAC SURGERY      STENTS    CATARACT REMOVAL WITH IMPLANT Left 2018    Dr. Rudolph    CORONARY ANGIOPLASTY WITH STENT PLACEMENT  2013    2 stents    HYSTERECTOMY (CERVIX STATUS UNKNOWN) N/A 2016    MASTECTOMY Left 1996    SKIN CANCER EXCISION      UPPER GASTROINTESTINAL ENDOSCOPY  2018    Dr Mayorga, biopsies       Immunization History:     There is no immunization history on file for this patient.    Active Problems:  Principal Problem:    Hip pain, acute, left  Active Problems:    Fall from standing  Resolved Problems:    * No resolved hospital problems. *      Isolation/Infection:       Nurse Assessment:  Last Vital Signs:BP (!) 151/74   Pulse 63   Temp 98 °F (36.7 °C) (Oral)   Resp 15   Ht 1.651 m (5' 5\")   Wt 54.1 kg (119 lb 4.3 oz)   LMP  (LMP Unknown)   SpO2 95%   BMI 19.85 kg/m²   Last documented pain score (0-10 scale): Pain Level: 6  Last Weight:   Wt Readings from Last 1 Encounters:   24 54.1 kg (119 lb 4.3 oz)     Mental Status:  oriented, alert, coherent, logical, thought processes intact, and able to concentrate and follow conversation     IV Access:  - None    Nursing Mobility/ADLs:  Walking   Assisted  Transfer  Assisted  Bathing  Assisted  Dressing  Assisted  Toileting  Assisted  Feeding  Independent  Med Admin  Assisted  Med Delivery   whole    Wound Care Documentation and Therapy:  Keep glued Prineo dressing intact. DO NOT

## 2024-04-24 NOTE — PLAN OF CARE
Problem: Discharge Planning  Goal: Discharge to home or other facility with appropriate resources  Outcome: Progressing  Flowsheets  Taken 4/24/2024 0119 by Martina Mcneil RN  Discharge to home or other facility with appropriate resources:   Identify barriers to discharge with patient and caregiver   Identify discharge learning needs (meds, wound care, etc)  Taken 4/23/2024 1804 by Rudy Crowder RN  Discharge to home or other facility with appropriate resources:   Identify barriers to discharge with patient and caregiver   Arrange for needed discharge resources and transportation as appropriate   Identify discharge learning needs (meds, wound care, etc)     Problem: Pain  Goal: Verbalizes/displays adequate comfort level or baseline comfort level  Outcome: Progressing  Flowsheets (Taken 4/24/2024 0119)  Verbalizes/displays adequate comfort level or baseline comfort level:   Encourage patient to monitor pain and request assistance   Assess pain using appropriate pain scale   Implement non-pharmacological measures as appropriate and evaluate response   Administer analgesics based on type and severity of pain and evaluate response     Problem: Skin/Tissue Integrity  Goal: Absence of new skin breakdown  Description: 1.  Monitor for areas of redness and/or skin breakdown  2.  Assess vascular access sites hourly  3.  Every 4-6 hours minimum:  Change oxygen saturation probe site  4.  Every 4-6 hours:  If on nasal continuous positive airway pressure, respiratory therapy assess nares and determine need for appliance change or resting period.  Outcome: Progressing  Note: Skin assessment complete. No new signs of skin breakdown noted. Assistance provided with repositioning while in bed.     Problem: ABCDS Injury Assessment  Goal: Absence of physical injury  Outcome: Progressing  Flowsheets (Taken 4/24/2024 0119)  Absence of Physical Injury: Implement safety measures based on patient assessment     Problem: Safety -

## 2024-04-24 NOTE — CARE COORDINATION
Received call from Ruthie ambrosio/ [facility]- they can accept   Discussed with patient- she is agreeable   Will initiate precert.  Electronically signed by Georgia Huber on 4/24/2024 at 4:14 PM  #[phone]

## 2024-04-24 NOTE — CARE COORDINATION
[facility]  PRE-CERT REQUEST SUBMITTED VIA ACCESS PORTAL    REQUESTED SNF:  [facility]    ANTICIPATED ADMIT DATE TO SNF:  04/24/2024      [facility] #:  [id number]     Electronically signed by Skylar Cardozo, Case Management Assistant Skylar Cardozo on 4/24/2024 at 4:51 PM

## 2024-04-24 NOTE — CARE COORDINATION
04/24/24 0954   Service Assessment   Patient Orientation Alert and Oriented;Place;Person;Situation   Cognition Alert   History Provided By Patient   Primary Caregiver Self   Accompanied By/Relationship daughters   Support Systems Children;Family Members   Patient's Healthcare Decision Maker is: Legal Next of Kin   PCP Verified by CM Yes   Last Visit to PCP Within last year   Current Functional Level Assistance with the following:;Mobility;Bathing;Dressing;Toileting;Housework   Can patient return to prior living arrangement No   Ability to make needs known: Good   Family able to assist with home care needs: No   Would you like for me to discuss the discharge plan with any other family members/significant others, and if so, who? Yes  (daughter at bedside)   Financial Resources Medicare  (BCBS Medicare)   Community Resources None   CM/SW Referral Other (see comment)  (dc needs)   Discharge Planning   Type of Residence House   Living Arrangements Children   Current Services Prior To Admission Durable Medical Equipment   Current DME Prior to Arrival Wheelchair;Walker   Potential Assistance Needed Skilled Nursing Facility   DME Ordered? No   Potential Assistance Purchasing Medications No   Type of Home Care Services None   Patient expects to be discharged to: Skilled nursing facility   Services At/After Discharge   Transition of Care Consult (CM Consult) SNF   Services At/After Discharge Skilled Nursing Facility (SNF)    Resource Information Provided? No   Mode of Transport at Discharge BLS   Condition of Participation: Discharge Planning   The Plan for Transition of Care is related to the following treatment goals: skilled facility   The Patient and/or Patient Representative was provided with a Choice of Provider? Patient   The Patient and/Or Patient Representative agree with the Discharge Plan? Yes   Freedom of Choice list was provided with basic dialogue that supports the patient's individualized plan of

## 2024-04-24 NOTE — PLAN OF CARE
Problem: Discharge Planning  Goal: Discharge to home or other facility with appropriate resources  4/24/2024 0815 by Jc Raza RN  Outcome: Progressing  Flowsheets (Taken 4/24/2024 0811)  Discharge to home or other facility with appropriate resources: Identify barriers to discharge with patient and caregiver     Problem: Pain  Goal: Verbalizes/displays adequate comfort level or baseline comfort level  4/24/2024 0815 by Jc Raza RN  Outcome: Progressing     Problem: Skin/Tissue Integrity  Goal: Absence of new skin breakdown  Description: 1.  Monitor for areas of redness and/or skin breakdown  2.  Assess vascular access sites hourly  3.  Every 4-6 hours minimum:  Change oxygen saturation probe site  4.  Every 4-6 hours:  If on nasal continuous positive airway pressure, respiratory therapy assess nares and determine need for appliance change or resting period.  4/24/2024 0815 by Jc Raza RN  Outcome: Progressing     Problem: ABCDS Injury Assessment  Goal: Absence of physical injury  4/24/2024 0815 by Jc Raza RN  Outcome: Progressing     Problem: Safety - Adult  Goal: Free from fall injury  4/24/2024 0815 by Jc Raza RN  Outcome: Progressing

## 2024-04-25 VITALS
OXYGEN SATURATION: 96 % | HEART RATE: 69 BPM | RESPIRATION RATE: 18 BRPM | HEIGHT: 65 IN | BODY MASS INDEX: 19.87 KG/M2 | SYSTOLIC BLOOD PRESSURE: 145 MMHG | DIASTOLIC BLOOD PRESSURE: 66 MMHG | WEIGHT: 119.27 LBS | TEMPERATURE: 98.3 F

## 2024-04-25 PROCEDURE — 2580000003 HC RX 258: Performed by: ORTHOPAEDIC SURGERY

## 2024-04-25 PROCEDURE — 97116 GAIT TRAINING THERAPY: CPT

## 2024-04-25 PROCEDURE — 94760 N-INVAS EAR/PLS OXIMETRY 1: CPT

## 2024-04-25 PROCEDURE — 97530 THERAPEUTIC ACTIVITIES: CPT

## 2024-04-25 PROCEDURE — 6370000000 HC RX 637 (ALT 250 FOR IP): Performed by: ORTHOPAEDIC SURGERY

## 2024-04-25 PROCEDURE — 6360000002 HC RX W HCPCS: Performed by: ORTHOPAEDIC SURGERY

## 2024-04-25 RX ADMIN — SODIUM CHLORIDE, PRESERVATIVE FREE 10 ML: 5 INJECTION INTRAVENOUS at 01:34

## 2024-04-25 RX ADMIN — OXYCODONE 5 MG: 5 TABLET ORAL at 09:50

## 2024-04-25 RX ADMIN — OXYCODONE 5 MG: 5 TABLET ORAL at 14:12

## 2024-04-25 RX ADMIN — ONDANSETRON 4 MG: 2 INJECTION INTRAMUSCULAR; INTRAVENOUS at 01:34

## 2024-04-25 RX ADMIN — SODIUM CHLORIDE, PRESERVATIVE FREE 10 ML: 5 INJECTION INTRAVENOUS at 09:51

## 2024-04-25 RX ADMIN — OXYCODONE 5 MG: 5 TABLET ORAL at 04:54

## 2024-04-25 ASSESSMENT — PAIN DESCRIPTION - PAIN TYPE
TYPE: SURGICAL PAIN
TYPE: SURGICAL PAIN

## 2024-04-25 ASSESSMENT — PAIN DESCRIPTION - FREQUENCY
FREQUENCY: CONTINUOUS
FREQUENCY: CONTINUOUS

## 2024-04-25 ASSESSMENT — PAIN DESCRIPTION - LOCATION
LOCATION: HIP

## 2024-04-25 ASSESSMENT — PAIN DESCRIPTION - ORIENTATION
ORIENTATION: LEFT

## 2024-04-25 ASSESSMENT — PAIN DESCRIPTION - ONSET
ONSET: GRADUAL
ONSET: GRADUAL

## 2024-04-25 ASSESSMENT — PAIN DESCRIPTION - DESCRIPTORS
DESCRIPTORS: ACHING
DESCRIPTORS: ACHING

## 2024-04-25 ASSESSMENT — PAIN SCALES - GENERAL
PAINLEVEL_OUTOF10: 3
PAINLEVEL_OUTOF10: 6
PAINLEVEL_OUTOF10: 5
PAINLEVEL_OUTOF10: 6

## 2024-04-25 NOTE — CARE COORDINATION
Spoke with patient and updated her on the precert. Discussed transportation. She reported she is unable to stand on her leg without significant pain and she cannot sit for the duration of ride due to pain. She rated her pain as 8/10 with any movement. Updated patients Rn.     Electronically signed by Whitney Chamberlain, RENETTA, LISW, Case Management on 4/25/2024 at 12:26 PM  [place] [phone]

## 2024-04-25 NOTE — CARE COORDINATION
[place] AUTHORIZATION INFORMATION      LOCATION:  [facility]     EFFECTIVE DATE:  4/25/2024    NEXT REVIEW DATE:  4/30/2024    AUTH#:   [id number]     Left message for Ruthie at [facility] regarding above.     Electronically signed by RENETTA Gibbs, LISW, Case Management on 4/25/2024 at 12:10 PM  [place] [phone]

## 2024-04-25 NOTE — DISCHARGE SUMMARY
V2.0  Discharge Summary    Name:  Brooklyn Mcneil /Age/Sex: 10/16/1934 (89 y.o. female)   Admit Date: 2024  Discharge Date: 24    MRN & CSN:  [id number] & [id number] Encounter Date and Time 24 1:37 PM EDT    Attending:  Efren Gudino MD Discharging Provider: Efren Gudino MD       Hospital Course:     Brief HPI: Brooklyn Mcneil is a 89 y.o. female who presented with mechanical fall with resultant hip fracture    Brief Problem Based Course:   Left femoral neck fracture  mechanical fall: Patient presented after mechanical fall at home with left hip pain.  Was found to have a left femoral neck fracture.  Ortho was consulted and patient underwent left hip hemiarthroplasty on .  Patient was monitored postoperatively with adequate pain control.  PT OT recommended ARU, but patient's insurance would not accept and as such patient was discharged to SNF to continue her recovery      The patient expressed appropriate understanding of, and agreement with the discharge recommendations, medications, and plan.     Consults this admission:  IP CONSULT TO SPIRITUAL SERVICES  IP CONSULT TO PHYSICAL MEDICINE REHAB    Discharge Diagnosis:   Hip pain, acute, left  Left femoral neck fracture    Discharge Instruction:   Follow up appointments:   Primary care physician: Narcisa Garrido MD within 2 weeks  Diet: regular diet   Activity: activity as tolerated  Disposition: Discharged to:   []Home, [][facility], [x]SNF, []Acute Rehab, []Hospice   Condition on discharge: Stable  Labs and Tests to be Followed up as an outpatient by PCP or Specialist:     Discharge Medications:        Medication List        START taking these medications      acetaminophen 325 MG tablet  Commonly known as: TYLENOL  Take 2 tablets by mouth every 6 hours as needed for Pain or Fever     aspirin 81 MG EC tablet  Commonly known as: Aspir-Low  Take 1 tablet by mouth in the morning and at bedtime     oxyCODONE 5 MG immediate release

## 2024-04-25 NOTE — PLAN OF CARE
Problem: Discharge Planning  Goal: Discharge to home or other facility with appropriate resources  Outcome: Progressing  Flowsheets (Taken 4/25/2024 0955)  Discharge to home or other facility with appropriate resources: Identify barriers to discharge with patient and caregiver     Problem: Pain  Goal: Verbalizes/displays adequate comfort level or baseline comfort level  Outcome: Progressing     Problem: Skin/Tissue Integrity  Goal: Absence of new skin breakdown  Description: 1.  Monitor for areas of redness and/or skin breakdown  2.  Assess vascular access sites hourly  3.  Every 4-6 hours minimum:  Change oxygen saturation probe site  4.  Every 4-6 hours:  If on nasal continuous positive airway pressure, respiratory therapy assess nares and determine need for appliance change or resting period.  Outcome: Progressing     Problem: ABCDS Injury Assessment  Goal: Absence of physical injury  Outcome: Progressing  Flowsheets (Taken 4/25/2024 1039)  Absence of Physical Injury: Implement safety measures based on patient assessment     Problem: Safety - Adult  Goal: Free from fall injury  Outcome: Progressing  Flowsheets (Taken 4/25/2024 1039)  Free From Fall Injury: Instruct family/caregiver on patient safety

## 2024-04-25 NOTE — PROGRESS NOTES
V2.0  [facility] [place]      Name:  Brooklyn Mcneil /Age/Sex: 10/16/1934  (89 y.o. female)   MRN & CSN:  [id number] & [id number] Encounter Date/Time: 2024 2:17 PM EDT    Location:  OR/NONE PCP: Narcisa Garrido MD       Hospital Day: 2    Assessment and Plan:   Brooklyn Mcneil is a 89 y.o. female with PMH of PMR, HT, CAD presenting with fall and hip fracture      Plan:  Left femoral neck fracture 2/2 mechanical fall  -Ortho planning on OR today, patient n.p.o.  -Pain control  -PT OT postop  CAD  -Restart metoprolol in a.m. postop  Hypertension  -Restart BP meds in a.m. postop if BP tolerating  History of left breast cancer s/p chemo and radiation  -In remission    Ppx: Lovenox held for surgery  Dispo: TBD pending PT OT eval    Subjective:     Chief Complaint: Hip Injury (Was at the park walking and went to step up and lost her balanced landing on her left pain; patient reports no LOC and didn't hit her head; no blood thinners; hip is externally rotated upon triage; sensation is intact on the left leg; patient reports neuropathy in bilateral extremities )     Interval history  Patient doing fairly well excepting her left hip pain.  Denies numbness/tingling in left foot beyond her baseline  Brief HPI  89F   With PMH of PMR, history of breast cancer, hypertension, CAD presenting following mechanical fall.  Patient reports that she was trying to step over a ledge and lost her balance and fell.  Denied head trauma.  Reported left hip pain with movement.  ED evaluation noted left femoral neck fracture.  Patient with further management    Review of Systems:    Review of Systems    10 point ROS negative except as stated above in \"subjective\" section    Objective:     Intake/Output Summary (Last 24 hours) at 2024 1417  Last data filed at 2024 1306  Gross per 24 hour   Intake 850 ml   Output 100 ml   Net 750 ml        Vitals:   Vitals:    24 1415   BP:    Pulse: 60   Resp: 
   04/22/24 1739   Encounter Summary   Encounter Overview/Reason  Spiritual/Emotional Needs   Service Provided For: Patient   Referral/Consult From: Patient   Last Encounter  04/22/24  ( visited and prayed with pt)   Complexity of Encounter Low   Begin Time 1721   End Time  1739   Total Time Calculated 18 min   Spiritual/Emotional needs   Type Spiritual Support   Assessment/Intervention/Outcome   Assessment Anxious;Coping   Intervention Active listening;Prayer (assurance of)/[place];Nurtured Hope   Outcome Encouraged;Expressed Gratitude       
4 Eyes Admission Assessment     I agree as the admission nurse that 2 RN's have performed a thorough Head to Toe Skin Assessment on the patient. ALL assessment sites listed below have been assessed on admission.       Areas assessed by both nurses: Ruyd  []   Head, Face, and Ears   []   Shoulders, Back, and Chest  []   Arms, Elbows, and Hands   []   Coccyx, Sacrum, and Ischum  []   Legs, Feet, and Heels        Does the Patient have Skin Breakdown?  No         Gianni Prevention initiated:  Yes   Wound Care Orders initiated:  No      [facility] nurse consulted for Pressure Injury (Stage 3,4, Unstageable, DTI, NWPT, and Complex wounds):  No      Nurse 1 eSignature: Electronically signed by Rudy Crowder RN on 4/23/24 at 7:02 PM EDT    **SHARE this note so that the co-signing nurse is able to place an eSignature**    Nurse 2 eSignature: Electronically signed by Zina He RN on 4/23/24 at 7:43 PM EDT             
4 Eyes Skin Assessment     NAME:  Brooklyn Mnceil  YOB: 1934  MEDICAL RECORD NUMBER:  [id number]    The patient is being assessed for  Admission    I agree that at least one RN has performed a thorough Head to Toe Skin Assessment on the patient. ALL assessment sites listed below have been assessed.      Areas assessed by both nurses:    Head, Face, Ears, Shoulders, Back, Chest, Arms, Elbows, Hands, Sacrum. Buttock, Coccyx, Ischium, Legs. Feet and Heels, and Under Medical Devices         Does the Patient have a Wound? No noted wound(s)       Gianni Prevention initiated by RN: Yes  Wound Care Orders initiated by RN: No    Pressure Injury (Stage 3,4, Unstageable, DTI, NWPT, and Complex wounds) if present, place Wound referral order by RN under : No    New Ostomies, if present place, Ostomy referral order under : No     Nurse 1 eSignature: Electronically signed by Floridalma Chaney RN on 4/22/24 at 4:31 PM EDT    **SHARE this note so that the co-signing nurse can place an eSignature**    Nurse 2 eSignature: {Esignature:[id number]}    
ARU consult noted. Chart reviewed. Will await therapy recommendations. Full consult per MD to follow.   
Admitted patient to room 4119 from the emergency department. Oriented to room, call light, tv, phone and dietary services. Respirations easy unlabored. Bed in lowest position and locked. Exit alarms in place. Non slip socks on. ID bracelet on and correct per patient verbally reporting name and date of birth. Call light and needed items in reach.   
Medication Reconciliation    List of medications patient is currently taking is complete.     Source of information: 1. Conversation with patient at bedside                                      2. EPIC records      Notes regarding home medications:   1. Patient takes a balanced multivitamin every day.  She denies use of any other prescription/OTC medications.    Abebe Villalpando [facility], PharmD, BCPS  4/22/2024 2:12 PM                
[facility] Orthopedic Surgery   Progress Note      S/P :  SUBJECTIVE  In bed  Alert and . Pain is   described in left hip and with the intensity of moderate. Pain is described as aching.       OBJECTIVE              Physical                      VITALS:  BP (!) 148/81   Pulse 71   Temp 97.7 °F (36.5 °C) (Oral)   Resp 16   Ht 1.651 m (5' 5\")   Wt 52.6 kg (115 lb 15.4 oz)   LMP  (LMP Unknown)   SpO2 95%   BMI 19.30 kg/m²                     MUSCULOSKELETAL:  left foot NVI. Wiggles toes to command. Able to plantarflex and dorsiflex ankle Pedal pulses are palpable.                    NEUROLOGIC:                                  Sensory:  Touch:  Left Lower Extremity:  normal                                                     Data       CBC:   Lab Results   Component Value Date/Time    WBC 9.5 04/22/2024 12:25 PM    RBC 4.52 04/22/2024 12:25 PM    HGB 14.4 04/22/2024 12:25 PM    HCT 42.8 04/22/2024 12:25 PM    MCV 94.7 04/22/2024 12:25 PM    MCH 31.8 04/22/2024 12:25 PM    MCHC 33.6 04/22/2024 12:25 PM    RDW 14.4 04/22/2024 12:25 PM     04/22/2024 12:25 PM    MPV 9.2 04/22/2024 12:25 PM        WBC:    Lab Results   Component Value Date/Time    WBC 9.5 04/22/2024 12:25 PM        Hemoglobin/Hematocrit:    Lab Results   Component Value Date/Time    HGB 14.4 04/22/2024 12:25 PM    HCT 42.8 04/22/2024 12:25 PM        PT/INR:    Lab Results   Component Value Date/Time    PROTIME 14.1 04/22/2024 12:25 PM    INR 1.07 04/22/2024 12:25 PM              Current Inpatient Medications             Current Facility-Administered Medications: sodium chloride flush 0.9 % injection 5-40 mL, 5-40 mL, IntraVENous, 2 times per day  sodium chloride flush 0.9 % injection 5-40 mL, 5-40 mL, IntraVENous, PRN  0.9 % sodium chloride infusion, , IntraVENous, PRN  potassium chloride (KLOR-CON M) extended release tablet 40 mEq, 40 mEq, Oral, PRN **OR** potassium bicarb-citric acid (EFFER-K) effervescent tablet 40 mEq, 40 mEq, Oral, PRN 
Patient A&O. Pt given PRN pain medication this shift per order. Prineo dressing remains in place and is clean, dry and intact. Call light within reach, able to make needs known, fall precautions in place.     Electronically signed by Jc Raza RN on 4/24/2024 at 8:02 PM      
Patient alert and oriented x4, discharged to [facility] with documented belongings. IV removed with no complications. Transported out of [facility] by stretcher. Reviewed discharge, follow up, and medication instructions with patient and patient verbalized understanding. Report called to [facility] x 2. No answer both times I called. Left message. Awaiting call back.     Electronically signed by cJ Raza RN on 4/25/2024 at 4:30 PM    
Patient ambulated to bathroom several times using walker. Was given pain medication once through the shift. Alert and oriented, on room air.   
Patient arrived to room from PACU, VSS. Patient still drowsy at this time. Patient on 2 l O2. Patient left hip dressing clean, dry and intact at this time. Patient has family at bedside. Writer repositioned patient and addressed concerns at this time. Patient continues to complain of nausea and dizziness. Call light and bedside table within reach. Will continue to monitor and reassess.  
Patient assisted to bathroom use front wheel rolling walker. Pt tolerated this activity well.  
Patient assisted to bathroom with use of stedy to urinate. Patient tolerated this activity well.  
Patient complained of nausea, no vomiting noted. It is too soon for nausea medication. Will give at appropriate time. Family x 2 at bedside. Call light in reach, bed alarm activated.  
Patient is alert & oriented x4, x1 assist with walker, 2/4 bed rails up, bed in lowest position, fall precautions in place, call light within reach. PRN pain medication given for surgical pain per order. Prineo dressing on L hip is clean, dry and intact. Pt's daughter at bedside.    Electronically signed by Jc Raza RN on 4/25/2024 at 9:54 AM      
Patient is alert & oriented x4, x1 assist with walker, 2/4 bed rails up, bed in lowest position, fall precautions in place, call light within reach. Prineo dressing on L hip is clean, dry and intact. Pt's daughter is at bedside.     Electronically signed by Jc Raza RN on 4/24/2024 at 8:10 AM    
Patient refused blood draw for Magnesium  
[facility] Orthopedic Surgery   Progress Note      S/P :  SUBJECTIVE  in bed. Alert and oriented. Family at bedside. . Pain is   described in left hip and with the intensity of moderate. Pain is described as aching.       OBJECTIVE              Physical                      VITALS:  BP (!) 118/59   Pulse 68   Temp 97.7 °F (36.5 °C) (Oral)   Resp 16   Ht 1.651 m (5' 5\")   Wt 55.9 kg (123 lb 3.8 oz)   LMP  (LMP Unknown)   SpO2 95%   BMI 20.51 kg/m²                     MUSCULOSKELETAL:  left foot NVI. Wiggles toes to command. Able to plantarflex and dorsiflex ankle Pedal pulses are palpable.                    NEUROLOGIC:                                  Sensory:  Touch:  Left Lower Extremity:  normal                                                 Surgical wound appears clean and dry left hip with Prineo dressing left lateral knee has Mepilex square dressing clean and dry.     Data       CBC:   Lab Results   Component Value Date/Time    WBC 9.5 04/22/2024 12:25 PM    RBC 4.52 04/22/2024 12:25 PM    HGB 14.4 04/22/2024 12:25 PM    HCT 42.8 04/22/2024 12:25 PM    MCV 94.7 04/22/2024 12:25 PM    MCH 31.8 04/22/2024 12:25 PM    MCHC 33.6 04/22/2024 12:25 PM    RDW 14.4 04/22/2024 12:25 PM     04/22/2024 12:25 PM    MPV 9.2 04/22/2024 12:25 PM        WBC:    Lab Results   Component Value Date/Time    WBC 9.5 04/22/2024 12:25 PM        Hemoglobin/Hematocrit:    Lab Results   Component Value Date/Time    HGB 14.4 04/22/2024 12:25 PM    HCT 42.8 04/22/2024 12:25 PM        PT/INR:    Lab Results   Component Value Date/Time    PROTIME 14.1 04/22/2024 12:25 PM    INR 1.07 04/22/2024 12:25 PM              Current Inpatient Medications             Current Facility-Administered Medications: 0.9 % sodium chloride infusion, , IntraVENous, PRN  oxyCODONE (ROXICODONE) immediate release tablet 5 mg, 5 mg, Oral, Q4H PRN **OR** oxyCODONE HCl (OXY-IR) immediate release tablet 10 mg, 10 mg, Oral, Q4H PRN  morphine (PF) injection 2 
in \"subjective\" section    Objective:     Intake/Output Summary (Last 24 hours) at 4/24/2024 1230  Last data filed at 4/23/2024 2225  Gross per 24 hour   Intake 2040 ml   Output 100 ml   Net 1940 ml          Vitals:   Vitals:    04/24/24 1009   BP: (!) 118/59   Pulse: 68   Resp: 16   Temp:    SpO2: 95%       Physical Exam:     General: NAD  Eyes: EOMI  ENT: neck supple; redness of bilateral cheeks; without   Cardiovascular: Regular rate.  Intact bilateral pedal pulses  Respiratory: Clear to auscultation  Gastrointestinal: Soft, non tender  Genitourinary: no suprapubic tenderness  Musculoskeletal: no hematoma noted at surgical site; appropriate tender  Skin: warm, dry; surgical scar clean and dry;   Neuro: Alert.  Intact sensation distal injury  Psych: Mood appropriate.     Medications:   Medications:    sodium chloride flush  5-40 mL IntraVENous 2 times per day    enoxaparin  40 mg SubCUTAneous Q24H      Infusions:    sodium chloride      sodium chloride 10 mL/hr at 04/23/24 1200     PRN Meds: sodium chloride, , PRN  oxyCODONE, 5 mg, Q4H PRN   Or  oxyCODONE, 10 mg, Q4H PRN  morphine, 2 mg, Q3H PRN   Or  morphine, 4 mg, Q3H PRN  sodium chloride flush, 5-40 mL, PRN  sodium chloride, , PRN  potassium chloride, 40 mEq, PRN   Or  potassium alternative oral replacement, 40 mEq, PRN   Or  potassium chloride, 10 mEq, PRN  magnesium sulfate, 2,000 mg, PRN  ondansetron, 4 mg, Q8H PRN   Or  ondansetron, 4 mg, Q6H PRN  polyethylene glycol, 17 g, Daily PRN  acetaminophen, 650 mg, Q6H PRN   Or  acetaminophen, 650 mg, Q6H PRN        Labs      Recent Results (from the past 24 hour(s))   POCT Glucose    Collection Time: 04/23/24  1:34 PM   Result Value Ref Range    POC Glucose 106 (H) 70 - 99 mg/dl    Performed on ACCU-CHEK    POCT Glucose    Collection Time: 04/23/24 10:44 PM   Result Value Ref Range    POC Glucose 186 (H) 70 - 99 mg/dl    Performed on ACCU-CHEK    POCT Glucose    Collection Time: 04/24/24  6:23 AM   Result Value 
prior to arrival and ice applied.     Social/Functional History  Social/Functional History  Lives With: Daughter (grandson)  Type of Home: Condo  Home Layout: One level, Able to Live on Main level with bedroom/bathroom  Home Access: Stairs to enter without rails  Entrance Stairs - Number of Steps: 2 to front door + threshold  Bathroom Shower/Tub: Walk-in shower  Bathroom Toilet: Handicap height  Bathroom Equipment: Grab bars in shower, Built-in shower seat  Home Equipment: Wheelchair-manual, Walker, rolling, Cane, Long-handled shoehorn, Reacher (hurry cane)  Has the patient had two or more falls in the past year or any fall with injury in the past year?: No  ADL Assistance: Independent  Homemaking Assistance: Needs assistance (dtr does cleaning, cooking)  Ambulation Assistance: Independent  Transfer Assistance: Independent  Active : Yes  Occupation: Retired  Leisure & Hobbies: \"walk about\". \"walking in woods\"  Additional Comments: vertigo? Sleeps in flat bed    Vision/Hearing  Vision  Vision: Impaired  Vision Exceptions: Wears glasses at all times  Hearing  Hearing: Within functional limits      Cognition   Orientation  Overall Orientation Status: Within Functional Limits  Orientation Level: Oriented X4  Cognition  Arousal/Alertness: Appropriate responses to stimuli  Following Commands: Follows one step commands consistently  Attention Span: Attends with cues to redirect  Memory: Decreased recall of precautions  Problem Solving: Decreased awareness of errors  Insights: Decreased awareness of deficits  Sequencing: Requires cues for some  Cognition Comment: Pt is easily distracted and required frequent redirection.     Objective   Observation/Palpation  Posture:  (Mild forward head, rounded shoulders)  Observation: L LE dressing is clean, dry, and intact.      Bed mobility  Supine to Sit: Contact guard assistance (HOB flat, increased time required to complete task, verbal cues required to maintain hip 
dressing with AE SUP  Short Term Goal 3: tolerate 5 min fxl standing task SUP  Short Term Goal 4: fxl tx and mobility with RW household distances SUP  Short Term Goal 5: UB bathing/dressing seated set up  Patient Goals   Patient goals : return home eventually       Therapy Time   Individual Concurrent Group Co-treatment   Time In 0800         Time Out 0855         Minutes 55         Timed Code Treatment Minutes: 40 Minutes (15 eval. 25 ADL 15 TA)       MARITO Cordova, OTR/L     
much help is needed walking in hospital room?: A Little  How much help is needed climbing 3-5 steps with a railing?: A Lot  AM-PAC Inpatient Mobility Raw Score : 17  AM-PAC Inpatient T-Scale Score : 42.13  Mobility Inpatient CMS 0-100% Score: 50.57  Mobility Inpatient CMS G-Code Modifier : CK           Goals  Short Term Goals  Time Frame for Short Term Goals: By discharge (all ongoing 4/25)  Short Term Goal 1: Pt will transfer supine to sit with mod I  Short Term Goal 2: Pt will transfer sit to/from stand with SBA  Short Term Goal 3: Pt will ambulate at least 50' with use of RW and SBA  Short Term Goal 4: Pt will asc/dec 3 steps with unilateral HR at min A  Patient Goals   Patient Goals : To regain strength and independence       Education  Patient Education  Education Given To: Patient  Education Provided: Role of Therapy;Plan of Care;Precautions;Transfer Training;Family Education  Education Provided Comments: General safety  Education Method: Verbal  Barriers to Learning: None  Education Outcome: Verbalized understanding;Continued education needed      Therapy Time   Individual Concurrent Group Co-treatment   Time In 0943         Time Out 1015         Minutes 32         Timed Code Treatment Minutes: 32 Minutes       Pily Ceballos, PT       Pily Ceballos PT, DPT [id number] 04/25/24 10:34 AM

## 2024-04-25 NOTE — CARE COORDINATION
[employer] authorization received via portal:      Payor approval ID:  [id number]    [employer] Approval ID:       SNF Location:  [facility]    Dates Approved:  04/25 THRU 04/30/2024      NRD:  04/30/2024    Electronically signed by Skylar Cardozo, Case Management Assistant Skylar Cardozo on 4/25/2024 at 1:53 PM

## 2024-04-25 NOTE — CARE COORDINATION
DISCHARGE SUMMARY     DATE OF DISCHARGE: 4/25/2024    DISCHARGE DESTINATION: skilled     FACILITY:   Discharging to Facility/ Agency   Name: [facility]  Address:  [address] [address]  Phone:  [phone]  Fax:  [phone]    INSURANCE PRECERT OBTAINED: yes    ALEJANDRO/PASAAR COMPLETED: yes    TRANSPORTATION:     Company Name:  [facility]      Time: 3pm    Phone Number: [phone]      COMMENTS: Informed patient of insurance precert and transport time. She reported she did not feel she could sit for duration of ride due to pain.   Daughter Chely not present in room x2. Left HIPAA compliant message for daughter Tammy.   Left message for Ruthie at [facility] regarding transport time.  Rn aware. Report number [phone].    Electronically signed by RENETTA Gibbs, MAC, Case Management on 4/25/2024 at 12:47 PM  [place] [phone]    1:22 PM  Received message from uRthie at [facility]--3pm  time is fine.  Electronically signed by RENETTA Sanchez, MAC, Case Management 4/25/2024 at 1:22 PM  [phone]

## 2024-04-26 LAB
BLOOD BANK DISPENSE STATUS: NORMAL
BLOOD BANK DISPENSE STATUS: NORMAL
BLOOD BANK PRODUCT CODE: NORMAL
BLOOD BANK PRODUCT CODE: NORMAL
BPU ID: NORMAL
BPU ID: NORMAL
DESCRIPTION BLOOD BANK: NORMAL
DESCRIPTION BLOOD BANK: NORMAL

## 2024-05-06 ENCOUNTER — OFFICE VISIT (OUTPATIENT)
Dept: ORTHOPEDIC SURGERY | Age: 89
End: 2024-05-06

## 2024-05-06 DIAGNOSIS — Z96.642 HISTORY OF LEFT HIP HEMIARTHROPLASTY: ICD-10-CM

## 2024-05-06 DIAGNOSIS — S72.002A CLOSED FRACTURE OF NECK OF LEFT FEMUR, INITIAL ENCOUNTER (HCC): Primary | ICD-10-CM

## 2024-05-06 PROCEDURE — 99024 POSTOP FOLLOW-UP VISIT: CPT | Performed by: ORTHOPAEDIC SURGERY

## 2024-05-06 NOTE — PROGRESS NOTES
[facility] Orthopaedics and Spine  Office Visit    Chief Complaint: Follow-up for left hip hemiarthroplasty    HPI:  Brooklyn Mcneil is a 89 y.o. who is here in follow-up of left hip hemiarthroplasty performed for displaced femoral neck fracture on April 23, 2024.  She is staying in a skilled nursing facility.  She is here in wheelchair today.  She reports that pain is controlled.  She has been walking with therapy and using a walker.      Past Medical History:   Diagnosis Date    Aortic insufficiency     Mild    Breast cancer (HCC)     Left, chemo and radiation    CAD (coronary artery disease)     Heart attack (HCC) 09/20/2013    HTN (hypertension)     Hx of blood clots     1996 after chemo    SVT (supraventricular tachycardia) ([facility])         ROS:  Constitutional: denies fever, chills, weight loss  MSK: denies pain in other joints, muscle aches  Neurological: denies numbness, tingling, weakness    Exam:  Appearance: sitting in exam room chair, appears to be in no acute distress, awake and alert  Resp: unlabored breathing on room air  Skin: warm, dry and intact with out erythema or significant increased temperature  Neuro: grossly intact both lower extremities. Intact sensation to light touch. Motor exam 4+ to 5/5 in all major motor groups.  LLE: Incision is healing as expected.  She is able to demonstrate hip flexion.  Motor function and sensation intact distally.    Imaging:  3 views of the left hip were performed and reviewed today. Significant for hip hemiarthroplasty prosthesis in place with no signs of osteolysis, loosening, fracture, or dislocation.     Assessment:  Left hip hemiarthroplasty for femoral neck fracture    Plan:  She is recovering well postoperatively.  She will continue working with physical therapy.  She has no restrictions at this point.  Follow-up for repeat assessment in 4 weeks.    This dictation was done with Oskar dictation and may contain mechanical errors related to

## 2024-05-20 ENCOUNTER — TELEPHONE (OUTPATIENT)
Dept: ORTHOPEDIC SURGERY | Age: 89
End: 2024-05-20

## 2024-05-20 NOTE — TELEPHONE ENCOUNTER
Medical Facility Question     Facility Name: [facility]  Contact Name: JULIANA  Contact Number: [phone]  Request or Information: DOES PT HAVE ANY RESTRICTIONS SINCE HIP SX    PLEASE CLL TO ADV

## 2024-06-03 ENCOUNTER — OFFICE VISIT (OUTPATIENT)
Dept: ORTHOPEDIC SURGERY | Age: 89
End: 2024-06-03

## 2024-06-03 VITALS — WEIGHT: 120 LBS | BODY MASS INDEX: 19.99 KG/M2 | HEIGHT: 65 IN

## 2024-06-03 DIAGNOSIS — Z96.642 HISTORY OF LEFT HIP HEMIARTHROPLASTY: ICD-10-CM

## 2024-06-03 DIAGNOSIS — S72.002D CLOSED FRACTURE OF NECK OF LEFT FEMUR WITH ROUTINE HEALING, SUBSEQUENT ENCOUNTER: Primary | ICD-10-CM

## 2024-06-03 PROCEDURE — 99024 POSTOP FOLLOW-UP VISIT: CPT | Performed by: ORTHOPAEDIC SURGERY

## 2024-06-03 NOTE — PROGRESS NOTES
[facility] Orthopaedics and Spine  Office Visit    Chief Complaint: Follow-up for left hip hemiarthroplasty    HPI:  Brooklyn Mcneil is a 89 y.o. who is here in follow-up of left hip hemiarthroplasty performed for displaced femoral neck fracture on April 23, 2024.  She has been back home for the last 1 month.  She walks with a cane and has finished formal physical therapy.  She rates pain as up to 5/10 at times.    ROS:  Constitutional: denies fever, chills, weight loss  MSK: denies pain in other joints, muscle aches  Neurological: denies numbness, tingling, weakness    Exam:  Appearance: sitting in exam room chair, appears to be in no acute distress, awake and alert  Resp: unlabored breathing on room air  Skin: warm, dry and intact with out erythema or significant increased temperature  Neuro: grossly intact both lower extremities. Intact sensation to light touch. Motor exam 4+ to 5/5 in all major motor groups.  LLE: Incision is healed.  She is able to demonstrate hip flexion.  Motor function and sensation intact distally.    Imaging:  3 views of the left hip were performed and reviewed today. Significant for hip hemiarthroplasty prosthesis in place with no signs of osteolysis, loosening, fracture, or dislocation.     Assessment:  Left hip hemiarthroplasty for femoral neck fracture    Plan:  She is recovering well postoperatively.  She will continue self-directed physical therapy exercises.  Follow-up for repeat assessment in 6 weeks.    This dictation was done with Dragon dictation and may contain mechanical errors related to translation.

## 2024-07-12 ENCOUNTER — OFFICE VISIT (OUTPATIENT)
Dept: ORTHOPEDIC SURGERY | Age: 89
End: 2024-07-12

## 2024-07-12 VITALS — WEIGHT: 120 LBS | HEIGHT: 65 IN | BODY MASS INDEX: 19.99 KG/M2

## 2024-07-12 DIAGNOSIS — Z96.642 HISTORY OF LEFT HIP HEMIARTHROPLASTY: Primary | ICD-10-CM

## 2024-07-12 NOTE — PROGRESS NOTES
[facility]  Office Visit    Chief Complaint: Follow-up for left hip hemiarthroplasty    HPI:  Brooklyn Mcneil is a 89 y.o. who is here in follow-up of left hip hemiarthroplasty performed for displaced femoral neck fracture on April 23, 2024.  She walks with use of a cane, although she has no assistive device here in the office today.  She has no pain and is happy with the outcome.    Exam:  Appearance: sitting in exam room chair, appears to be in no acute distress, awake and alert  Resp: unlabored breathing on room air  Skin: warm, dry and intact with out erythema or significant increased temperature  Neuro: grossly intact both lower extremities. Intact sensation to light touch. Motor exam 4+ to 5/5 in all major motor groups.  LLE: Incision is healed.  She is able to demonstrate hip flexion.  Motor function and sensation intact distally.    Imaging:  3 views of the left hip were performed and reviewed today. Significant for hip hemiarthroplasty prosthesis in place with no signs of osteolysis, loosening, fracture, or dislocation.     Assessment:  Left hip hemiarthroplasty for femoral neck fracture    Plan:  She is doing very well postoperatively.  She will continue activity as tolerated.  She may follow-up here as needed should she have any issues in the future.    This dictation was done with [employer] dictation and may contain mechanical errors related to translation.

## 2024-08-07 ENCOUNTER — TELEPHONE (OUTPATIENT)
Dept: ORTHOPEDIC SURGERY | Age: 89
End: 2024-08-07

## 2024-08-07 NOTE — TELEPHONE ENCOUNTER
Medical Facility Question     Facility Name: [facility]  Contact Name: XOCHITL  Contact Number: [phone]  Request or Information: PT WAS SEEN AFTER SX FOR HOME HEALTH CARE ORDER WAS ORGINALLY SENT TO PCP AFTER PT WAS DISCHARGED HOME HEALTH CARE WAS ADV PLAN SHOULD HAVE BEEN SENT TO DR. RAMIREZ    PLEASE CLL TO ADV

## 2024-08-07 NOTE — TELEPHONE ENCOUNTER
Medical Facility Question     [facility]: [facility]  Contact Name: XOCHITL  Contact Number: [phone]  Request or Information: PT WAS SEEN AFTER SX FOR HOME HEALTH CARE ORDER WAS ORGINALLY SENT TO PCP AFTER PT WAS DISCHARGED HOME HEALTH CARE WAS ADV PLAN SHOULD HAVE BEEN SENT TO DR. RAMIREZ    PLEASE CLL TO ADV

## 2024-10-23 NOTE — PROGRESS NOTES
Psych Follow Up Progress Note    3/30/21  Cal Bullock  [id number]    I have reviewed recent documentation:  Cal Bullock is a 80 y.o. female  This patient  has a past medical history of Aortic insufficiency, Breast cancer ([facility] [address] CAD (coronary artery disease), Heart attack ([facility] [address] HTN (hypertension), Hx of blood clots, and SVT (supraventricular tachycardia) ([facility] [address] Chief Complaint   Patient presents with    Anxiety     I called this patient today because of safety concerns regarding coronavirus. Subjective/Interval Hx:  Pleased that her Zeina Pear is blooming. And she may go up to daughter's house for [facility] Tuesday! Paxil not making pt's dizziness any worse, so that's good. Is it helping? Who knows. But it's not making anything worse. Son Cornel Garcia is coming to live with pt, and this is exciting. Pt feels like she'd like to go to [facility] this. Location:  [place]  Severity:  mild  Context:  As above. Modifiers:  meds  Quality:  anxiety    Objective:    No results found for this or any previous visit (from the past 168 hour(s)). Current Outpatient Medications   Medication Sig Dispense Refill    fluvoxaMINE (LUVOX) 25 MG tablet Take 0.5 tablets by mouth nightly 30 tablet 3    clonazePAM (KLONOPIN) 0.5 MG tablet Take 1/4 tablet twice daily. 45 tablet 2    PARoxetine (PAXIL) 10 MG tablet Take 0.5 tablets by mouth daily 15 tablet 2    albuterol sulfate HFA (VENTOLIN HFA) 108 (90 Base) MCG/ACT inhaler Inhale 2 puffs into the lungs 4 times daily for 5 days 1 Inhaler 0    gabapentin (NEURONTIN) 100 MG capsule Take 1 capsule by mouth 2 times daily for 30 days.  Intended supply: 30 days 60 capsule 1    atorvastatin (LIPITOR) 40 MG tablet Take 40 mg by mouth      TURMERIC PO Take by mouth daily      Misc Natural Products (TART CHERRY ADVANCED PO) Take by mouth daily      lisinopril (PRINIVIL;ZESTRIL) 10 MG tablet Take 1 tablet by mouth daily 30 tablet 3    amLODIPine (NORVASC) 10 MG tablet Take 10 mg by mouth daily      Omega-3 Fatty Acids (FISH OIL PO) Take 1 tablet by mouth daily      nitroGLYCERIN (NITROSTAT) 0.4 MG SL tablet Place 0.4 mg under the tongue every 5 minutes as needed for Chest pain Dissolve 1 tab under tongue at first sign of chest pain. May repeat every 5 minutes until relief is obtained. If pain persists after taking 3 tabs in a 15-minute period, or the pain is different than is typically experienced, call 9-1-1 immediately.  Multiple Vitamin (MULTI VITAMIN DAILY PO) Take by mouth Prodovite      Ascorbic Acid (VITAMIN C) 500 MG CAPS Take 1 tablet by mouth      Potassium Gluconate 550 MG TABS Take by mouth      Vitamin D (CHOLECALCIFEROL) 1000 UNITS CAPS capsule Take 1,000 Units by mouth daily      aspirin 81 MG tablet Take 81 mg by mouth daily. No current facility-administered medications for this visit. Controlled Substance Monitoring:    Acute and Chronic Pain Monitoring:   RX Monitoring 3/30/2021   Attestation -   Periodic Controlled Substance Monitoring No signs of potential drug abuse or diversion identified. A/P 87 y/o F c      1. OCPD traits, anxiety NOS-We'll cont fluvoxamine 12.5 qhs.  Cont klonopin 0.25 bid prn.  Sees Cards Dr. Lizzie Marie, [facility]!  She's working with pt on \"acceptance. \"  And this is hard, but perhaps necessary. 2.  Memory trouble-Referred to [facility] d/o center.       Has been on prozac, zoloft, effexor, klonopin, xanax, lexapro, paxil.      I have spent >21 mins with this patient on working on coping skills and med mgt, and > 1/2 of that time was spent counseling this pt. None